# Patient Record
Sex: MALE | Race: AMERICAN INDIAN OR ALASKA NATIVE | NOT HISPANIC OR LATINO | Employment: OTHER | URBAN - METROPOLITAN AREA
[De-identification: names, ages, dates, MRNs, and addresses within clinical notes are randomized per-mention and may not be internally consistent; named-entity substitution may affect disease eponyms.]

---

## 2019-12-01 ENCOUNTER — ANESTHESIA EVENT (OUTPATIENT)
Dept: SURGERY | Facility: HOSPITAL | Age: 64
DRG: 026 | End: 2019-12-01
Payer: COMMERCIAL

## 2019-12-01 ENCOUNTER — ANESTHESIA (OUTPATIENT)
Dept: SURGERY | Facility: HOSPITAL | Age: 64
DRG: 026 | End: 2019-12-01
Payer: COMMERCIAL

## 2019-12-01 ENCOUNTER — HOSPITAL ENCOUNTER (INPATIENT)
Facility: HOSPITAL | Age: 64
LOS: 5 days | Discharge: HOME HEALTH | DRG: 026 | End: 2019-12-06
Attending: EMERGENCY MEDICINE | Admitting: PSYCHIATRY & NEUROLOGY
Payer: COMMERCIAL

## 2019-12-01 DIAGNOSIS — S06.5XAA SUBDURAL HEMATOMA: ICD-10-CM

## 2019-12-01 DIAGNOSIS — J96.02 ACUTE RESPIRATORY FAILURE WITH HYPOXIA AND HYPERCAPNIA: ICD-10-CM

## 2019-12-01 DIAGNOSIS — I62.00 SUBDURAL HEMORRHAGE: Primary | ICD-10-CM

## 2019-12-01 DIAGNOSIS — R56.9 SEIZURE: ICD-10-CM

## 2019-12-01 DIAGNOSIS — J96.01 ACUTE RESPIRATORY FAILURE WITH HYPOXIA AND HYPERCAPNIA: ICD-10-CM

## 2019-12-01 DIAGNOSIS — S06.5XAA SDH (SUBDURAL HEMATOMA): ICD-10-CM

## 2019-12-01 DIAGNOSIS — Z00.00 EVALUATION BY MEDICAL SERVICE REQUIRED: ICD-10-CM

## 2019-12-01 DIAGNOSIS — F10.929 ALCOHOLIC INTOXICATION WITH COMPLICATION: ICD-10-CM

## 2019-12-01 DIAGNOSIS — F10.229 ALCOHOL INTOXICATION IN ACTIVE ALCOHOLIC WITH COMPLICATION: ICD-10-CM

## 2019-12-01 PROBLEM — M62.82 NON-TRAUMATIC RHABDOMYOLYSIS: Status: ACTIVE | Noted: 2019-12-01

## 2019-12-01 PROBLEM — E86.1 HYPOVOLEMIA: Status: ACTIVE | Noted: 2019-12-01

## 2019-12-01 PROBLEM — E78.5 HYPERLIPIDEMIA: Status: ACTIVE | Noted: 2019-12-01

## 2019-12-01 PROBLEM — E87.6 HYPOKALEMIA: Status: ACTIVE | Noted: 2019-12-01

## 2019-12-01 PROBLEM — E11.9 DIABETES MELLITUS: Status: ACTIVE | Noted: 2019-12-01

## 2019-12-01 PROBLEM — E03.9 HYPOTHYROIDISM: Status: ACTIVE | Noted: 2019-12-01

## 2019-12-01 PROBLEM — G93.5 BRAIN COMPRESSION: Status: ACTIVE | Noted: 2019-12-01

## 2019-12-01 PROBLEM — I10 ESSENTIAL HYPERTENSION: Status: ACTIVE | Noted: 2019-12-01

## 2019-12-01 PROBLEM — G93.6 CEREBRAL EDEMA: Status: ACTIVE | Noted: 2019-12-01

## 2019-12-01 PROBLEM — S06.9XAA TBI (TRAUMATIC BRAIN INJURY): Status: ACTIVE | Noted: 2019-12-01

## 2019-12-01 PROBLEM — Z29.89 SEIZURE PROPHYLAXIS: Status: ACTIVE | Noted: 2019-12-01
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
## 2019-12-01 PROBLEM — R73.09 ELEVATED HEMOGLOBIN A1C: Status: ACTIVE | Noted: 2019-12-01

## 2019-12-01 LAB
ABO + RH BLD: NORMAL
ALBUMIN SERPL BCP-MCNC: 3.6 G/DL (ref 3.5–5.2)
ALBUMIN SERPL BCP-MCNC: 3.8 G/DL (ref 3.5–5.2)
ALBUMIN SERPL BCP-MCNC: 4.1 G/DL (ref 3.5–5.2)
ALLENS TEST: ABNORMAL
ALP SERPL-CCNC: 49 U/L (ref 55–135)
ALP SERPL-CCNC: 51 U/L (ref 55–135)
ALP SERPL-CCNC: 58 U/L (ref 55–135)
ALT SERPL W/O P-5'-P-CCNC: 43 U/L (ref 10–44)
ALT SERPL W/O P-5'-P-CCNC: 45 U/L (ref 10–44)
ALT SERPL W/O P-5'-P-CCNC: 50 U/L (ref 10–44)
ANION GAP SERPL CALC-SCNC: 11 MMOL/L (ref 8–16)
ANION GAP SERPL CALC-SCNC: 15 MMOL/L (ref 8–16)
AST SERPL-CCNC: 48 U/L (ref 10–40)
AST SERPL-CCNC: 50 U/L (ref 10–40)
AST SERPL-CCNC: 59 U/L (ref 10–40)
BASOPHILS # BLD AUTO: 0.03 K/UL (ref 0–0.2)
BASOPHILS # BLD AUTO: 0.05 K/UL (ref 0–0.2)
BASOPHILS NFR BLD: 0.3 % (ref 0–1.9)
BASOPHILS NFR BLD: 0.5 % (ref 0–1.9)
BILIRUB SERPL-MCNC: 0.3 MG/DL (ref 0.1–1)
BILIRUB SERPL-MCNC: 0.4 MG/DL (ref 0.1–1)
BILIRUB SERPL-MCNC: 0.4 MG/DL (ref 0.1–1)
BILIRUB UR QL STRIP: NEGATIVE
BLD GP AB SCN CELLS X3 SERPL QL: NORMAL
BUN SERPL-MCNC: 16 MG/DL (ref 8–23)
BUN SERPL-MCNC: 16 MG/DL (ref 8–23)
BUN SERPL-MCNC: 17 MG/DL (ref 8–23)
BUN SERPL-MCNC: 17 MG/DL (ref 8–23)
CALCIUM SERPL-MCNC: 8 MG/DL (ref 8.7–10.5)
CALCIUM SERPL-MCNC: 8 MG/DL (ref 8.7–10.5)
CALCIUM SERPL-MCNC: 8.3 MG/DL (ref 8.7–10.5)
CALCIUM SERPL-MCNC: 9.4 MG/DL (ref 8.7–10.5)
CHLORIDE SERPL-SCNC: 101 MMOL/L (ref 95–110)
CHLORIDE SERPL-SCNC: 103 MMOL/L (ref 95–110)
CHLORIDE SERPL-SCNC: 105 MMOL/L (ref 95–110)
CHLORIDE SERPL-SCNC: 105 MMOL/L (ref 95–110)
CHOLEST SERPL-MCNC: 165 MG/DL (ref 120–199)
CHOLEST/HDLC SERPL: 4.2 {RATIO} (ref 2–5)
CK MB SERPL-MCNC: 21.4 NG/ML (ref 0.1–6.5)
CK MB SERPL-RTO: 1 % (ref 0–5)
CK SERPL-CCNC: 1615 U/L (ref 20–200)
CK SERPL-CCNC: 2057 U/L (ref 20–200)
CLARITY UR REFRACT.AUTO: CLEAR
CO2 SERPL-SCNC: 20 MMOL/L (ref 23–29)
CO2 SERPL-SCNC: 23 MMOL/L (ref 23–29)
CO2 SERPL-SCNC: 23 MMOL/L (ref 23–29)
CO2 SERPL-SCNC: 25 MMOL/L (ref 23–29)
COLOR UR AUTO: YELLOW
CREAT SERPL-MCNC: 0.8 MG/DL (ref 0.5–1.4)
CREAT SERPL-MCNC: 0.9 MG/DL (ref 0.5–1.4)
DELSYS: ABNORMAL
DIFFERENTIAL METHOD: ABNORMAL
DIFFERENTIAL METHOD: ABNORMAL
EOSINOPHIL # BLD AUTO: 0 K/UL (ref 0–0.5)
EOSINOPHIL # BLD AUTO: 0.1 K/UL (ref 0–0.5)
EOSINOPHIL NFR BLD: 0.2 % (ref 0–8)
EOSINOPHIL NFR BLD: 1.3 % (ref 0–8)
ERYTHROCYTE [DISTWIDTH] IN BLOOD BY AUTOMATED COUNT: 14 % (ref 11.5–14.5)
ERYTHROCYTE [DISTWIDTH] IN BLOOD BY AUTOMATED COUNT: 14.2 % (ref 11.5–14.5)
EST. GFR  (AFRICAN AMERICAN): >60 ML/MIN/1.73 M^2
EST. GFR  (NON AFRICAN AMERICAN): >60 ML/MIN/1.73 M^2
ESTIMATED AVG GLUCOSE: 134 MG/DL (ref 68–131)
ETHANOL SERPL-MCNC: 216 MG/DL
FLOW: 2
GLUCOSE SERPL-MCNC: 161 MG/DL (ref 70–110)
GLUCOSE SERPL-MCNC: 164 MG/DL (ref 70–110)
GLUCOSE SERPL-MCNC: 166 MG/DL (ref 70–110)
GLUCOSE SERPL-MCNC: 166 MG/DL (ref 70–110)
GLUCOSE SERPL-MCNC: 194 MG/DL (ref 70–110)
GLUCOSE UR QL STRIP: NEGATIVE
HBA1C MFR BLD HPLC: 6.3 % (ref 4–5.6)
HCO3 UR-SCNC: 15.8 MMOL/L (ref 24–28)
HCT VFR BLD AUTO: 34.5 % (ref 40–54)
HCT VFR BLD AUTO: 39.8 % (ref 40–54)
HDLC SERPL-MCNC: 39 MG/DL (ref 40–75)
HDLC SERPL: 23.6 % (ref 20–50)
HGB BLD-MCNC: 11 G/DL (ref 14–18)
HGB BLD-MCNC: 12.8 G/DL (ref 14–18)
HGB UR QL STRIP: NEGATIVE
IMM GRANULOCYTES # BLD AUTO: 0.07 K/UL (ref 0–0.04)
IMM GRANULOCYTES # BLD AUTO: 0.09 K/UL (ref 0–0.04)
IMM GRANULOCYTES NFR BLD AUTO: 0.7 % (ref 0–0.5)
IMM GRANULOCYTES NFR BLD AUTO: 0.8 % (ref 0–0.5)
KETONES UR QL STRIP: NEGATIVE
LDLC SERPL CALC-MCNC: 88.4 MG/DL (ref 63–159)
LEUKOCYTE ESTERASE UR QL STRIP: NEGATIVE
LYMPHOCYTES # BLD AUTO: 1.5 K/UL (ref 1–4.8)
LYMPHOCYTES # BLD AUTO: 1.8 K/UL (ref 1–4.8)
LYMPHOCYTES NFR BLD: 14 % (ref 18–48)
LYMPHOCYTES NFR BLD: 16.8 % (ref 18–48)
MAGNESIUM SERPL-MCNC: 1.5 MG/DL (ref 1.6–2.6)
MAGNESIUM SERPL-MCNC: 1.7 MG/DL (ref 1.6–2.6)
MCH RBC QN AUTO: 31.3 PG (ref 27–31)
MCH RBC QN AUTO: 31.5 PG (ref 27–31)
MCHC RBC AUTO-ENTMCNC: 31.9 G/DL (ref 32–36)
MCHC RBC AUTO-ENTMCNC: 32.2 G/DL (ref 32–36)
MCV RBC AUTO: 98 FL (ref 82–98)
MCV RBC AUTO: 98 FL (ref 82–98)
MODE: ABNORMAL
MONOCYTES # BLD AUTO: 0.5 K/UL (ref 0.3–1)
MONOCYTES # BLD AUTO: 0.6 K/UL (ref 0.3–1)
MONOCYTES NFR BLD: 4.5 % (ref 4–15)
MONOCYTES NFR BLD: 5.6 % (ref 4–15)
NEUTROPHILS # BLD AUTO: 7.8 K/UL (ref 1.8–7.7)
NEUTROPHILS # BLD AUTO: 8.8 K/UL (ref 1.8–7.7)
NEUTROPHILS NFR BLD: 75.1 % (ref 38–73)
NEUTROPHILS NFR BLD: 80.2 % (ref 38–73)
NITRITE UR QL STRIP: NEGATIVE
NONHDLC SERPL-MCNC: 126 MG/DL
NRBC BLD-RTO: 0 /100 WBC
NRBC BLD-RTO: 0 /100 WBC
PCO2 BLDA: 27.5 MMHG (ref 35–45)
PH SMN: 7.37 [PH] (ref 7.35–7.45)
PH UR STRIP: 5 [PH] (ref 5–8)
PHOSPHATE SERPL-MCNC: 3.2 MG/DL (ref 2.7–4.5)
PHOSPHATE SERPL-MCNC: 3.4 MG/DL (ref 2.7–4.5)
PLATELET # BLD AUTO: 232 K/UL (ref 150–350)
PLATELET # BLD AUTO: 239 K/UL (ref 150–350)
PMV BLD AUTO: 10 FL (ref 9.2–12.9)
PMV BLD AUTO: 9.7 FL (ref 9.2–12.9)
PO2 BLDA: 99 MMHG (ref 80–100)
POC BE: -9 MMOL/L
POC SATURATED O2: 98 % (ref 95–100)
POC TCO2: 17 MMOL/L (ref 23–27)
POCT GLUCOSE: 164 MG/DL (ref 70–110)
POCT GLUCOSE: 172 MG/DL (ref 70–110)
POTASSIUM SERPL-SCNC: 3.9 MMOL/L (ref 3.5–5.1)
POTASSIUM SERPL-SCNC: 4 MMOL/L (ref 3.5–5.1)
POTASSIUM SERPL-SCNC: 4.1 MMOL/L (ref 3.5–5.1)
POTASSIUM SERPL-SCNC: 4.1 MMOL/L (ref 3.5–5.1)
PROT SERPL-MCNC: 6.4 G/DL (ref 6–8.4)
PROT SERPL-MCNC: 7 G/DL (ref 6–8.4)
PROT SERPL-MCNC: 7.7 G/DL (ref 6–8.4)
PROT UR QL STRIP: NEGATIVE
RBC # BLD AUTO: 3.52 M/UL (ref 4.6–6.2)
RBC # BLD AUTO: 4.06 M/UL (ref 4.6–6.2)
SAMPLE: ABNORMAL
SITE: ABNORMAL
SODIUM SERPL-SCNC: 137 MMOL/L (ref 136–145)
SODIUM SERPL-SCNC: 138 MMOL/L (ref 136–145)
SODIUM SERPL-SCNC: 139 MMOL/L (ref 136–145)
SODIUM SERPL-SCNC: 139 MMOL/L (ref 136–145)
SP GR UR STRIP: 1.02 (ref 1–1.03)
SP02: 98
TRIGL SERPL-MCNC: 188 MG/DL (ref 30–150)
TROPONIN I SERPL DL<=0.01 NG/ML-MCNC: <0.006 NG/ML (ref 0–0.03)
TSH SERPL DL<=0.005 MIU/L-ACNC: 1.11 UIU/ML (ref 0.4–4)
URN SPEC COLLECT METH UR: NORMAL
WBC # BLD AUTO: 10.43 K/UL (ref 3.9–12.7)
WBC # BLD AUTO: 10.97 K/UL (ref 3.9–12.7)

## 2019-12-01 PROCEDURE — 99291 PR CRITICAL CARE, E/M 30-74 MINUTES: ICD-10-PCS | Mod: 25,,, | Performed by: PHYSICIAN ASSISTANT

## 2019-12-01 PROCEDURE — 93010 EKG 12-LEAD: ICD-10-PCS | Mod: ,,, | Performed by: INTERNAL MEDICINE

## 2019-12-01 PROCEDURE — 25000003 PHARM REV CODE 250

## 2019-12-01 PROCEDURE — 84443 ASSAY THYROID STIM HORMONE: CPT

## 2019-12-01 PROCEDURE — 25000003 PHARM REV CODE 250: Performed by: NEUROLOGICAL SURGERY

## 2019-12-01 PROCEDURE — D9220A PRA ANESTHESIA: Mod: CRNA,,, | Performed by: NURSE ANESTHETIST, CERTIFIED REGISTERED

## 2019-12-01 PROCEDURE — 31500 INSERT EMERGENCY AIRWAY: CPT | Mod: GC,,, | Performed by: PSYCHIATRY & NEUROLOGY

## 2019-12-01 PROCEDURE — 99223 PR INITIAL HOSPITAL CARE,LEVL III: ICD-10-PCS | Mod: 57,GC,, | Performed by: NEUROLOGICAL SURGERY

## 2019-12-01 PROCEDURE — C1713 ANCHOR/SCREW BN/BN,TIS/BN: HCPCS | Performed by: NEUROLOGICAL SURGERY

## 2019-12-01 PROCEDURE — 63600175 PHARM REV CODE 636 W HCPCS: Performed by: NURSE ANESTHETIST, CERTIFIED REGISTERED

## 2019-12-01 PROCEDURE — 81003 URINALYSIS AUTO W/O SCOPE: CPT

## 2019-12-01 PROCEDURE — 84100 ASSAY OF PHOSPHORUS: CPT | Mod: 91

## 2019-12-01 PROCEDURE — 93005 ELECTROCARDIOGRAM TRACING: CPT

## 2019-12-01 PROCEDURE — 82550 ASSAY OF CK (CPK): CPT | Mod: 91

## 2019-12-01 PROCEDURE — 80061 LIPID PANEL: CPT

## 2019-12-01 PROCEDURE — 86920 COMPATIBILITY TEST SPIN: CPT

## 2019-12-01 PROCEDURE — 63600175 PHARM REV CODE 636 W HCPCS: Performed by: NURSE PRACTITIONER

## 2019-12-01 PROCEDURE — 84484 ASSAY OF TROPONIN QUANT: CPT

## 2019-12-01 PROCEDURE — 94761 N-INVAS EAR/PLS OXIMETRY MLT: CPT

## 2019-12-01 PROCEDURE — 94003 VENT MGMT INPAT SUBQ DAY: CPT

## 2019-12-01 PROCEDURE — 63600175 PHARM REV CODE 636 W HCPCS: Performed by: PHYSICIAN ASSISTANT

## 2019-12-01 PROCEDURE — 82553 CREATINE MB FRACTION: CPT

## 2019-12-01 PROCEDURE — 25000003 PHARM REV CODE 250: Performed by: NURSE ANESTHETIST, CERTIFIED REGISTERED

## 2019-12-01 PROCEDURE — 25000003 PHARM REV CODE 250: Performed by: NURSE PRACTITIONER

## 2019-12-01 PROCEDURE — 31500 PR INSERT, EMERGENCY ENDOTRACH AIRWAY: ICD-10-PCS | Mod: GC,,, | Performed by: PSYCHIATRY & NEUROLOGY

## 2019-12-01 PROCEDURE — 95951 PR EEG MONITORING/VIDEORECORD: CPT | Mod: 26,,, | Performed by: PSYCHIATRY & NEUROLOGY

## 2019-12-01 PROCEDURE — C1729 CATH, DRAINAGE: HCPCS | Performed by: NEUROLOGICAL SURGERY

## 2019-12-01 PROCEDURE — 93010 ELECTROCARDIOGRAM REPORT: CPT | Mod: ,,, | Performed by: INTERNAL MEDICINE

## 2019-12-01 PROCEDURE — 83036 HEMOGLOBIN GLYCOSYLATED A1C: CPT

## 2019-12-01 PROCEDURE — 37000009 HC ANESTHESIA EA ADD 15 MINS: Performed by: NEUROLOGICAL SURGERY

## 2019-12-01 PROCEDURE — 25000003 PHARM REV CODE 250: Performed by: PHYSICIAN ASSISTANT

## 2019-12-01 PROCEDURE — 61312 CRNEC/CRNOT STTL XDRL/SDRL: CPT | Mod: GC,,, | Performed by: NEUROLOGICAL SURGERY

## 2019-12-01 PROCEDURE — 99292 PR CRITICAL CARE, ADDL 30 MIN: ICD-10-PCS | Mod: 25,GC,, | Performed by: PSYCHIATRY & NEUROLOGY

## 2019-12-01 PROCEDURE — 27800903 OPTIME MED/SURG SUP & DEVICES OTHER IMPLANTS: Performed by: NEUROLOGICAL SURGERY

## 2019-12-01 PROCEDURE — 63600175 PHARM REV CODE 636 W HCPCS: Performed by: STUDENT IN AN ORGANIZED HEALTH CARE EDUCATION/TRAINING PROGRAM

## 2019-12-01 PROCEDURE — 25000003 PHARM REV CODE 250: Performed by: ANESTHESIOLOGY

## 2019-12-01 PROCEDURE — 36000711: Performed by: NEUROLOGICAL SURGERY

## 2019-12-01 PROCEDURE — 83735 ASSAY OF MAGNESIUM: CPT

## 2019-12-01 PROCEDURE — 99291 CRITICAL CARE FIRST HOUR: CPT | Mod: ,,, | Performed by: EMERGENCY MEDICINE

## 2019-12-01 PROCEDURE — D9220A PRA ANESTHESIA: ICD-10-PCS | Mod: ANES,,, | Performed by: ANESTHESIOLOGY

## 2019-12-01 PROCEDURE — 37799 UNLISTED PX VASCULAR SURGERY: CPT

## 2019-12-01 PROCEDURE — 25000003 PHARM REV CODE 250: Performed by: STUDENT IN AN ORGANIZED HEALTH CARE EDUCATION/TRAINING PROGRAM

## 2019-12-01 PROCEDURE — D9220A PRA ANESTHESIA: ICD-10-PCS | Mod: CRNA,,, | Performed by: NURSE ANESTHETIST, CERTIFIED REGISTERED

## 2019-12-01 PROCEDURE — 36620 PR INSERT CATH,ART,PERCUT,SHORTTERM: ICD-10-PCS | Mod: 59,,, | Performed by: ANESTHESIOLOGY

## 2019-12-01 PROCEDURE — 20000000 HC ICU ROOM

## 2019-12-01 PROCEDURE — 99223 1ST HOSP IP/OBS HIGH 75: CPT | Mod: 57,GC,, | Performed by: NEUROLOGICAL SURGERY

## 2019-12-01 PROCEDURE — 99291 CRITICAL CARE FIRST HOUR: CPT | Mod: 25,,, | Performed by: PHYSICIAN ASSISTANT

## 2019-12-01 PROCEDURE — D9220A PRA ANESTHESIA: Mod: ANES,,, | Performed by: ANESTHESIOLOGY

## 2019-12-01 PROCEDURE — 61312 PR OPEN SKULL EVAC HEMATOMA, SUPRATENTORIAL, SUB/ EXTRADURAL: ICD-10-PCS | Mod: GC,,, | Performed by: NEUROLOGICAL SURGERY

## 2019-12-01 PROCEDURE — 82550 ASSAY OF CK (CPK): CPT

## 2019-12-01 PROCEDURE — 80053 COMPREHEN METABOLIC PANEL: CPT | Mod: 91

## 2019-12-01 PROCEDURE — 95951 HC EEG MONITORING/VIDEO RECORD: CPT

## 2019-12-01 PROCEDURE — 63600175 PHARM REV CODE 636 W HCPCS

## 2019-12-01 PROCEDURE — C9113 INJ PANTOPRAZOLE SODIUM, VIA: HCPCS | Performed by: STUDENT IN AN ORGANIZED HEALTH CARE EDUCATION/TRAINING PROGRAM

## 2019-12-01 PROCEDURE — 37000008 HC ANESTHESIA 1ST 15 MINUTES: Performed by: NEUROLOGICAL SURGERY

## 2019-12-01 PROCEDURE — 63600175 PHARM REV CODE 636 W HCPCS: Performed by: NEUROLOGICAL SURGERY

## 2019-12-01 PROCEDURE — 82962 GLUCOSE BLOOD TEST: CPT

## 2019-12-01 PROCEDURE — 27201423 OPTIME MED/SURG SUP & DEVICES STERILE SUPPLY: Performed by: NEUROLOGICAL SURGERY

## 2019-12-01 PROCEDURE — 86850 RBC ANTIBODY SCREEN: CPT

## 2019-12-01 PROCEDURE — 99900035 HC TECH TIME PER 15 MIN (STAT)

## 2019-12-01 PROCEDURE — 95951 PR EEG MONITORING/VIDEORECORD: ICD-10-PCS | Mod: 26,,, | Performed by: PSYCHIATRY & NEUROLOGY

## 2019-12-01 PROCEDURE — 63600175 PHARM REV CODE 636 W HCPCS: Performed by: ANESTHESIOLOGY

## 2019-12-01 PROCEDURE — 99292 CRITICAL CARE ADDL 30 MIN: CPT | Mod: 25,GC,, | Performed by: PSYCHIATRY & NEUROLOGY

## 2019-12-01 PROCEDURE — 80320 DRUG SCREEN QUANTALCOHOLS: CPT

## 2019-12-01 PROCEDURE — 83735 ASSAY OF MAGNESIUM: CPT | Mod: 91

## 2019-12-01 PROCEDURE — 99291 CRITICAL CARE FIRST HOUR: CPT | Mod: 25

## 2019-12-01 PROCEDURE — 36620 INSERTION CATHETER ARTERY: CPT | Mod: 59,,, | Performed by: ANESTHESIOLOGY

## 2019-12-01 PROCEDURE — 94002 VENT MGMT INPAT INIT DAY: CPT

## 2019-12-01 PROCEDURE — 27000221 HC OXYGEN, UP TO 24 HOURS

## 2019-12-01 PROCEDURE — 25000003 PHARM REV CODE 250: Performed by: PSYCHIATRY & NEUROLOGY

## 2019-12-01 PROCEDURE — 85025 COMPLETE CBC W/AUTO DIFF WBC: CPT | Mod: 91

## 2019-12-01 PROCEDURE — 36000710: Performed by: NEUROLOGICAL SURGERY

## 2019-12-01 PROCEDURE — 82803 BLOOD GASES ANY COMBINATION: CPT

## 2019-12-01 PROCEDURE — 99291 PR CRITICAL CARE, E/M 30-74 MINUTES: ICD-10-PCS | Mod: ,,, | Performed by: EMERGENCY MEDICINE

## 2019-12-01 PROCEDURE — 84100 ASSAY OF PHOSPHORUS: CPT

## 2019-12-01 DEVICE — PLATE BONE 6 HOLE DBL Y SHAPE: Type: IMPLANTABLE DEVICE | Site: CRANIAL | Status: FUNCTIONAL

## 2019-12-01 DEVICE — SCREW UN3 AXS SD 1.5X4MM: Type: IMPLANTABLE DEVICE | Site: CRANIAL | Status: FUNCTIONAL

## 2019-12-01 DEVICE — DURAMATRIX ONLAY 4X5 MEMBRANE: Type: IMPLANTABLE DEVICE | Site: CRANIAL | Status: FUNCTIONAL

## 2019-12-01 DEVICE — COVER UN3 BURRHOLE 14MM TAB: Type: IMPLANTABLE DEVICE | Site: CRANIAL | Status: FUNCTIONAL

## 2019-12-01 RX ORDER — ETOMIDATE 2 MG/ML
INJECTION INTRAVENOUS
Status: DISPENSED
Start: 2019-12-01 | End: 2019-12-02

## 2019-12-01 RX ORDER — SODIUM CHLORIDE 9 MG/ML
INJECTION, SOLUTION INTRAVENOUS CONTINUOUS
Status: DISCONTINUED | OUTPATIENT
Start: 2019-12-01 | End: 2019-12-04

## 2019-12-01 RX ORDER — FENTANYL CITRATE 50 UG/ML
25 INJECTION, SOLUTION INTRAMUSCULAR; INTRAVENOUS EVERY 4 HOURS PRN
Status: DISCONTINUED | OUTPATIENT
Start: 2019-12-01 | End: 2019-12-03

## 2019-12-01 RX ORDER — SODIUM CHLORIDE 0.9 % (FLUSH) 0.9 %
10 SYRINGE (ML) INJECTION
Status: DISCONTINUED | OUTPATIENT
Start: 2019-12-01 | End: 2019-12-06 | Stop reason: HOSPADM

## 2019-12-01 RX ORDER — LIDOCAINE HCL/PF 100 MG/5ML
SYRINGE (ML) INTRAVENOUS
Status: DISCONTINUED | OUTPATIENT
Start: 2019-12-01 | End: 2019-12-01

## 2019-12-01 RX ORDER — AMLODIPINE AND BENAZEPRIL HYDROCHLORIDE 5; 10 MG/1; MG/1
1 CAPSULE ORAL DAILY
Status: DISCONTINUED | OUTPATIENT
Start: 2019-12-02 | End: 2019-12-01

## 2019-12-01 RX ORDER — SUCCINYLCHOLINE CHLORIDE 20 MG/ML
INJECTION INTRAMUSCULAR; INTRAVENOUS
Status: DISPENSED
Start: 2019-12-01 | End: 2019-12-02

## 2019-12-01 RX ORDER — INSULIN ASPART 100 [IU]/ML
1-10 INJECTION, SOLUTION INTRAVENOUS; SUBCUTANEOUS EVERY 6 HOURS PRN
Status: DISCONTINUED | OUTPATIENT
Start: 2019-12-01 | End: 2019-12-06 | Stop reason: HOSPADM

## 2019-12-01 RX ORDER — LEVETIRACETAM 5 MG/ML
500 INJECTION INTRAVASCULAR EVERY 12 HOURS
Status: DISCONTINUED | OUTPATIENT
Start: 2019-12-01 | End: 2019-12-01

## 2019-12-01 RX ORDER — ONDANSETRON 2 MG/ML
4 INJECTION INTRAMUSCULAR; INTRAVENOUS EVERY 8 HOURS PRN
Status: DISCONTINUED | OUTPATIENT
Start: 2019-12-01 | End: 2019-12-06 | Stop reason: HOSPADM

## 2019-12-01 RX ORDER — FENTANYL CITRATE 50 UG/ML
INJECTION, SOLUTION INTRAMUSCULAR; INTRAVENOUS
Status: DISCONTINUED | OUTPATIENT
Start: 2019-12-01 | End: 2019-12-01

## 2019-12-01 RX ORDER — DEXMEDETOMIDINE HYDROCHLORIDE 4 UG/ML
INJECTION, SOLUTION INTRAVENOUS
Status: COMPLETED
Start: 2019-12-01 | End: 2019-12-01

## 2019-12-01 RX ORDER — CLONAZEPAM 0.5 MG/1
1 TABLET ORAL 2 TIMES DAILY PRN
Status: DISCONTINUED | OUTPATIENT
Start: 2019-12-01 | End: 2019-12-02

## 2019-12-01 RX ORDER — ROCURONIUM BROMIDE 10 MG/ML
100 INJECTION, SOLUTION INTRAVENOUS ONCE
Status: COMPLETED | OUTPATIENT
Start: 2019-12-01 | End: 2019-12-01

## 2019-12-01 RX ORDER — PROPOFOL 10 MG/ML
INJECTION, EMULSION INTRAVENOUS
Status: DISPENSED
Start: 2019-12-01 | End: 2019-12-02

## 2019-12-01 RX ORDER — LEVOTHYROXINE SODIUM 50 UG/1
100 TABLET ORAL
Status: DISCONTINUED | OUTPATIENT
Start: 2019-12-02 | End: 2019-12-01

## 2019-12-01 RX ORDER — LEVOTHYROXINE SODIUM 50 UG/1
100 TABLET ORAL
Status: DISCONTINUED | OUTPATIENT
Start: 2019-12-02 | End: 2019-12-05

## 2019-12-01 RX ORDER — NICARDIPINE HYDROCHLORIDE 0.2 MG/ML
2.5 INJECTION INTRAVENOUS CONTINUOUS
Status: DISCONTINUED | OUTPATIENT
Start: 2019-12-01 | End: 2019-12-02

## 2019-12-01 RX ORDER — PANTOPRAZOLE SODIUM 40 MG/10ML
40 INJECTION, POWDER, LYOPHILIZED, FOR SOLUTION INTRAVENOUS DAILY
Status: DISCONTINUED | OUTPATIENT
Start: 2019-12-01 | End: 2019-12-02

## 2019-12-01 RX ORDER — METOPROLOL SUCCINATE 25 MG/1
50 TABLET, EXTENDED RELEASE ORAL DAILY
Status: DISCONTINUED | OUTPATIENT
Start: 2019-12-02 | End: 2019-12-01

## 2019-12-01 RX ORDER — ROCURONIUM BROMIDE 10 MG/ML
INJECTION, SOLUTION INTRAVENOUS
Status: DISPENSED
Start: 2019-12-01 | End: 2019-12-02

## 2019-12-01 RX ORDER — ATORVASTATIN CALCIUM 20 MG/1
80 TABLET, FILM COATED ORAL DAILY
Status: DISCONTINUED | OUTPATIENT
Start: 2019-12-02 | End: 2019-12-01

## 2019-12-01 RX ORDER — CEFAZOLIN SODIUM 1 G/3ML
INJECTION, POWDER, FOR SOLUTION INTRAMUSCULAR; INTRAVENOUS
Status: DISCONTINUED | OUTPATIENT
Start: 2019-12-01 | End: 2019-12-01

## 2019-12-01 RX ORDER — BACITRACIN ZINC 500 UNIT/G
OINTMENT (GRAM) TOPICAL
Status: DISCONTINUED | OUTPATIENT
Start: 2019-12-01 | End: 2019-12-01 | Stop reason: HOSPADM

## 2019-12-01 RX ORDER — MUPIROCIN 20 MG/G
1 OINTMENT TOPICAL 2 TIMES DAILY
Status: COMPLETED | OUTPATIENT
Start: 2019-12-01 | End: 2019-12-05

## 2019-12-01 RX ORDER — PROPOFOL 10 MG/ML
INJECTION, EMULSION INTRAVENOUS
Status: COMPLETED
Start: 2019-12-01 | End: 2019-12-01

## 2019-12-01 RX ORDER — GLYCOPYRROLATE 0.2 MG/ML
INJECTION INTRAMUSCULAR; INTRAVENOUS
Status: DISCONTINUED | OUTPATIENT
Start: 2019-12-01 | End: 2019-12-01

## 2019-12-01 RX ORDER — FOLIC ACID 1 MG/1
1 TABLET ORAL DAILY
Status: DISCONTINUED | OUTPATIENT
Start: 2019-12-01 | End: 2019-12-01

## 2019-12-01 RX ORDER — GLUCAGON 1 MG
1 KIT INJECTION
Status: DISCONTINUED | OUTPATIENT
Start: 2019-12-01 | End: 2019-12-06 | Stop reason: HOSPADM

## 2019-12-01 RX ORDER — METOPROLOL TARTRATE 50 MG/1
50 TABLET ORAL ONCE
Status: DISCONTINUED | OUTPATIENT
Start: 2019-12-01 | End: 2019-12-01

## 2019-12-01 RX ORDER — NICARDIPINE HYDROCHLORIDE 0.2 MG/ML
2.5 INJECTION INTRAVENOUS CONTINUOUS
Status: DISCONTINUED | OUTPATIENT
Start: 2019-12-01 | End: 2019-12-01

## 2019-12-01 RX ORDER — METOPROLOL TARTRATE 50 MG/1
50 TABLET ORAL DAILY
Status: DISCONTINUED | OUTPATIENT
Start: 2019-12-02 | End: 2019-12-01

## 2019-12-01 RX ORDER — ACETAMINOPHEN 650 MG/1
650 SUPPOSITORY RECTAL EVERY 6 HOURS PRN
Status: DISCONTINUED | OUTPATIENT
Start: 2019-12-01 | End: 2019-12-06 | Stop reason: HOSPADM

## 2019-12-01 RX ORDER — PROPOFOL 10 MG/ML
40 INJECTION, EMULSION INTRAVENOUS CONTINUOUS
Status: DISCONTINUED | OUTPATIENT
Start: 2019-12-01 | End: 2019-12-01

## 2019-12-01 RX ORDER — INSULIN ASPART 100 [IU]/ML
0-5 INJECTION, SOLUTION INTRAVENOUS; SUBCUTANEOUS EVERY 6 HOURS PRN
Status: DISCONTINUED | OUTPATIENT
Start: 2019-12-01 | End: 2019-12-01

## 2019-12-01 RX ORDER — DIAZEPAM 10 MG/2ML
5 INJECTION INTRAMUSCULAR ONCE
Status: COMPLETED | OUTPATIENT
Start: 2019-12-01 | End: 2019-12-01

## 2019-12-01 RX ORDER — AMLODIPINE AND BENAZEPRIL HYDROCHLORIDE 5; 10 MG/1; MG/1
1 CAPSULE ORAL DAILY
Status: DISCONTINUED | OUTPATIENT
Start: 2019-12-02 | End: 2019-12-05

## 2019-12-01 RX ORDER — FENTANYL CITRATE 50 UG/ML
25 INJECTION, SOLUTION INTRAMUSCULAR; INTRAVENOUS ONCE
Status: COMPLETED | OUTPATIENT
Start: 2019-12-01 | End: 2019-12-01

## 2019-12-01 RX ORDER — PHENYLEPHRINE HCL IN 0.9% NACL 1 MG/10 ML
SYRINGE (ML) INTRAVENOUS
Status: DISPENSED
Start: 2019-12-01 | End: 2019-12-02

## 2019-12-01 RX ORDER — NEOSTIGMINE METHYLSULFATE 0.5 MG/ML
INJECTION, SOLUTION INTRAVENOUS
Status: DISCONTINUED | OUTPATIENT
Start: 2019-12-01 | End: 2019-12-01

## 2019-12-01 RX ORDER — ACETAMINOPHEN 325 MG/1
650 TABLET ORAL EVERY 6 HOURS PRN
Status: DISCONTINUED | OUTPATIENT
Start: 2019-12-01 | End: 2019-12-05

## 2019-12-01 RX ORDER — FENTANYL CITRATE 50 UG/ML
INJECTION, SOLUTION INTRAMUSCULAR; INTRAVENOUS
Status: COMPLETED
Start: 2019-12-01 | End: 2019-12-01

## 2019-12-01 RX ORDER — BACITRACIN 50000 [IU]/1
INJECTION, POWDER, FOR SOLUTION INTRAMUSCULAR
Status: DISCONTINUED | OUTPATIENT
Start: 2019-12-01 | End: 2019-12-01 | Stop reason: HOSPADM

## 2019-12-01 RX ORDER — LORAZEPAM 2 MG/ML
INJECTION INTRAMUSCULAR
Status: COMPLETED
Start: 2019-12-01 | End: 2019-12-01

## 2019-12-01 RX ORDER — LIDOCAINE HYDROCHLORIDE AND EPINEPHRINE 10; 10 MG/ML; UG/ML
INJECTION, SOLUTION INFILTRATION; PERINEURAL
Status: DISCONTINUED | OUTPATIENT
Start: 2019-12-01 | End: 2019-12-01 | Stop reason: HOSPADM

## 2019-12-01 RX ORDER — POLYETHYLENE GLYCOL 3350 17 G/17G
17 POWDER, FOR SOLUTION ORAL DAILY
Status: DISCONTINUED | OUTPATIENT
Start: 2019-12-01 | End: 2019-12-01

## 2019-12-01 RX ORDER — PROPOFOL 10 MG/ML
VIAL (ML) INTRAVENOUS
Status: DISCONTINUED | OUTPATIENT
Start: 2019-12-01 | End: 2019-12-01

## 2019-12-01 RX ORDER — HYDROCODONE BITARTRATE AND ACETAMINOPHEN 5; 325 MG/1; MG/1
1 TABLET ORAL EVERY 4 HOURS PRN
Status: DISCONTINUED | OUTPATIENT
Start: 2019-12-01 | End: 2019-12-01

## 2019-12-01 RX ORDER — CLONAZEPAM 0.5 MG/1
1 TABLET ORAL 2 TIMES DAILY PRN
Status: DISCONTINUED | OUTPATIENT
Start: 2019-12-01 | End: 2019-12-01

## 2019-12-01 RX ORDER — LEVETIRACETAM 10 MG/ML
1000 INJECTION INTRAVASCULAR EVERY 12 HOURS
Status: DISCONTINUED | OUTPATIENT
Start: 2019-12-01 | End: 2019-12-05

## 2019-12-01 RX ORDER — ACETAMINOPHEN 325 MG/1
650 TABLET ORAL EVERY 6 HOURS PRN
Status: DISCONTINUED | OUTPATIENT
Start: 2019-12-01 | End: 2019-12-01

## 2019-12-01 RX ORDER — ETOMIDATE 2 MG/ML
40 INJECTION INTRAVENOUS ONCE
Status: COMPLETED | OUTPATIENT
Start: 2019-12-01 | End: 2019-12-01

## 2019-12-01 RX ORDER — LEVOTHYROXINE SODIUM 100 UG/1
100 TABLET ORAL
Status: DISCONTINUED | OUTPATIENT
Start: 2019-12-02 | End: 2019-12-01

## 2019-12-01 RX ORDER — DIAZEPAM 10 MG/2ML
10 INJECTION INTRAMUSCULAR ONCE
Status: DISCONTINUED | OUTPATIENT
Start: 2019-12-01 | End: 2019-12-02

## 2019-12-01 RX ORDER — METOPROLOL TARTRATE 50 MG/1
50 TABLET ORAL ONCE
Status: DISCONTINUED | OUTPATIENT
Start: 2019-12-02 | End: 2019-12-01

## 2019-12-01 RX ORDER — NICARDIPINE HYDROCHLORIDE 2.5 MG/ML
INJECTION INTRAVENOUS
Status: DISCONTINUED | OUTPATIENT
Start: 2019-12-01 | End: 2019-12-01

## 2019-12-01 RX ORDER — PHENYLEPHRINE HYDROCHLORIDE 10 MG/ML
INJECTION INTRAVENOUS
Status: DISCONTINUED | OUTPATIENT
Start: 2019-12-01 | End: 2019-12-01

## 2019-12-01 RX ORDER — DIAZEPAM 10 MG/2ML
INJECTION INTRAMUSCULAR
Status: COMPLETED
Start: 2019-12-01 | End: 2019-12-01

## 2019-12-01 RX ORDER — ROCURONIUM BROMIDE 10 MG/ML
INJECTION, SOLUTION INTRAVENOUS
Status: DISCONTINUED | OUTPATIENT
Start: 2019-12-01 | End: 2019-12-01

## 2019-12-01 RX ORDER — LORAZEPAM 2 MG/ML
INJECTION INTRAMUSCULAR
Status: DISPENSED
Start: 2019-12-01 | End: 2019-12-02

## 2019-12-01 RX ORDER — HYDROCODONE BITARTRATE AND ACETAMINOPHEN 5; 325 MG/1; MG/1
1 TABLET ORAL EVERY 4 HOURS PRN
Status: DISCONTINUED | OUTPATIENT
Start: 2019-12-01 | End: 2019-12-04

## 2019-12-01 RX ORDER — GLUCAGON 1 MG
1 KIT INJECTION
Status: DISCONTINUED | OUTPATIENT
Start: 2019-12-01 | End: 2019-12-01

## 2019-12-01 RX ORDER — POLYETHYLENE GLYCOL 3350 17 G/17G
17 POWDER, FOR SOLUTION ORAL DAILY
Status: DISCONTINUED | OUTPATIENT
Start: 2019-12-02 | End: 2019-12-01

## 2019-12-01 RX ORDER — POLYETHYLENE GLYCOL 3350 17 G/17G
17 POWDER, FOR SOLUTION ORAL DAILY
Status: DISCONTINUED | OUTPATIENT
Start: 2019-12-02 | End: 2019-12-05

## 2019-12-01 RX ORDER — SUCCINYLCHOLINE CHLORIDE 20 MG/ML
INJECTION INTRAMUSCULAR; INTRAVENOUS
Status: DISCONTINUED | OUTPATIENT
Start: 2019-12-01 | End: 2019-12-01

## 2019-12-01 RX ORDER — LEVOTHYROXINE SODIUM 50 UG/1
100 TABLET ORAL DAILY
Status: DISCONTINUED | OUTPATIENT
Start: 2019-12-01 | End: 2019-12-01

## 2019-12-01 RX ORDER — CEFAZOLIN SODIUM 1 G/3ML
2 INJECTION, POWDER, FOR SOLUTION INTRAMUSCULAR; INTRAVENOUS
Status: DISCONTINUED | OUTPATIENT
Start: 2019-12-01 | End: 2019-12-03

## 2019-12-01 RX ORDER — METOPROLOL SUCCINATE 25 MG/1
50 TABLET, EXTENDED RELEASE ORAL DAILY
Status: DISCONTINUED | OUTPATIENT
Start: 2019-12-02 | End: 2019-12-02

## 2019-12-01 RX ORDER — LORAZEPAM 2 MG/ML
2 INJECTION INTRAMUSCULAR ONCE
Status: COMPLETED | OUTPATIENT
Start: 2019-12-01 | End: 2019-12-01

## 2019-12-01 RX ADMIN — DEXMEDETOMIDINE HYDROCHLORIDE 400 MCG: 4 INJECTION, SOLUTION INTRAVENOUS at 02:12

## 2019-12-01 RX ADMIN — SUCCINYLCHOLINE CHLORIDE 160 MG: 20 INJECTION, SOLUTION INTRAMUSCULAR; INTRAVENOUS at 06:12

## 2019-12-01 RX ADMIN — CEFAZOLIN 2 G: 1 INJECTION, POWDER, FOR SOLUTION INTRAMUSCULAR; INTRAVENOUS at 03:12

## 2019-12-01 RX ADMIN — PROPOFOL: 10 INJECTION, EMULSION INTRAVENOUS at 04:12

## 2019-12-01 RX ADMIN — LEVETIRACETAM 500 MG: 5 INJECTION INTRAVENOUS at 11:12

## 2019-12-01 RX ADMIN — NICARDIPINE HYDROCHLORIDE 0.2 MG: 2.5 INJECTION INTRAVENOUS at 09:12

## 2019-12-01 RX ADMIN — FENTANYL CITRATE 25 MCG: 50 INJECTION INTRAMUSCULAR; INTRAVENOUS at 02:12

## 2019-12-01 RX ADMIN — ETOMIDATE 40 MG: 2 INJECTION INTRAVENOUS at 04:12

## 2019-12-01 RX ADMIN — LORAZEPAM 2 MG: 2 INJECTION INTRAMUSCULAR at 03:12

## 2019-12-01 RX ADMIN — SODIUM CHLORIDE 75 ML/HR: 0.9 INJECTION, SOLUTION INTRAVENOUS at 11:12

## 2019-12-01 RX ADMIN — SODIUM CHLORIDE 500 ML: 0.9 INJECTION, SOLUTION INTRAVENOUS at 10:12

## 2019-12-01 RX ADMIN — LORAZEPAM 2 MG: 2 INJECTION, SOLUTION INTRAMUSCULAR; INTRAVENOUS at 03:12

## 2019-12-01 RX ADMIN — NICARDIPINE HYDROCHLORIDE 0.2 MG: 2.5 INJECTION INTRAVENOUS at 08:12

## 2019-12-01 RX ADMIN — FENTANYL CITRATE 25 MCG: 50 INJECTION, SOLUTION INTRAMUSCULAR; INTRAVENOUS at 02:12

## 2019-12-01 RX ADMIN — NICARDIPINE HYDROCHLORIDE 2.5 MG/HR: 0.2 INJECTION, SOLUTION INTRAVENOUS at 11:12

## 2019-12-01 RX ADMIN — MUPIROCIN 1 G: 20 OINTMENT TOPICAL at 08:12

## 2019-12-01 RX ADMIN — GLYCOPYRROLATE 0.4 MG: 0.2 INJECTION, SOLUTION INTRAMUSCULAR; INTRAVENOUS at 09:12

## 2019-12-01 RX ADMIN — SODIUM CHLORIDE 1000 ML: 0.9 INJECTION, SOLUTION INTRAVENOUS at 03:12

## 2019-12-01 RX ADMIN — ROCURONIUM BROMIDE 40 MG: 10 INJECTION, SOLUTION INTRAVENOUS at 06:12

## 2019-12-01 RX ADMIN — FOLIC ACID 1 MG: 5 INJECTION, SOLUTION INTRAMUSCULAR; INTRAVENOUS; SUBCUTANEOUS at 05:12

## 2019-12-01 RX ADMIN — CEFAZOLIN 2 G: 1 INJECTION, POWDER, FOR SOLUTION INTRAMUSCULAR; INTRAVENOUS at 10:12

## 2019-12-01 RX ADMIN — DIAZEPAM 10 MG: 5 INJECTION, SOLUTION INTRAMUSCULAR; INTRAVENOUS at 03:12

## 2019-12-01 RX ADMIN — CEFAZOLIN 2 G: 330 INJECTION, POWDER, FOR SOLUTION INTRAMUSCULAR; INTRAVENOUS at 06:12

## 2019-12-01 RX ADMIN — ROCURONIUM BROMIDE 10 MG: 10 INJECTION, SOLUTION INTRAVENOUS at 06:12

## 2019-12-01 RX ADMIN — PANTOPRAZOLE SODIUM 40 MG: 40 INJECTION, POWDER, FOR SOLUTION INTRAVENOUS at 11:12

## 2019-12-01 RX ADMIN — LIDOCAINE HYDROCHLORIDE 100 MG: 20 INJECTION, SOLUTION INTRAVENOUS at 06:12

## 2019-12-01 RX ADMIN — PHENYLEPHRINE HYDROCHLORIDE 100 MCG: 10 INJECTION INTRAVENOUS at 07:12

## 2019-12-01 RX ADMIN — NEOSTIGMINE METHYLSULFATE 3 MG: 0.5 INJECTION INTRAVENOUS at 09:12

## 2019-12-01 RX ADMIN — DIAZEPAM 5 MG: 5 INJECTION, SOLUTION INTRAMUSCULAR; INTRAVENOUS at 09:12

## 2019-12-01 RX ADMIN — MUPIROCIN 1 G: 20 OINTMENT TOPICAL at 11:12

## 2019-12-01 RX ADMIN — SODIUM CHLORIDE, SODIUM GLUCONATE, SODIUM ACETATE, POTASSIUM CHLORIDE, MAGNESIUM CHLORIDE, SODIUM PHOSPHATE, DIBASIC, AND POTASSIUM PHOSPHATE: .53; .5; .37; .037; .03; .012; .00082 INJECTION, SOLUTION INTRAVENOUS at 05:12

## 2019-12-01 RX ADMIN — NICARDIPINE HYDROCHLORIDE 0.1 MG: 2.5 INJECTION INTRAVENOUS at 08:12

## 2019-12-01 RX ADMIN — SODIUM CHLORIDE: 0.9 INJECTION, SOLUTION INTRAVENOUS at 12:12

## 2019-12-01 RX ADMIN — FOLIC ACID: 5 INJECTION, SOLUTION INTRAMUSCULAR; INTRAVENOUS; SUBCUTANEOUS at 12:12

## 2019-12-01 RX ADMIN — PROPOFOL 50 MCG/KG/MIN: 10 INJECTION, EMULSION INTRAVENOUS at 10:12

## 2019-12-01 RX ADMIN — PROPOFOL 40 MG: 10 INJECTION, EMULSION INTRAVENOUS at 06:12

## 2019-12-01 RX ADMIN — SODIUM CHLORIDE: 0.9 INJECTION, SOLUTION INTRAVENOUS at 04:12

## 2019-12-01 RX ADMIN — DIAZEPAM 5 MG: 5 INJECTION, SOLUTION INTRAMUSCULAR; INTRAVENOUS at 03:12

## 2019-12-01 RX ADMIN — SODIUM CHLORIDE, SODIUM GLUCONATE, SODIUM ACETATE, POTASSIUM CHLORIDE, MAGNESIUM CHLORIDE, SODIUM PHOSPHATE, DIBASIC, AND POTASSIUM PHOSPHATE: .53; .5; .37; .037; .03; .012; .00082 INJECTION, SOLUTION INTRAVENOUS at 06:12

## 2019-12-01 RX ADMIN — ROCURONIUM BROMIDE 100 MG: 10 INJECTION, SOLUTION INTRAVENOUS at 04:12

## 2019-12-01 RX ADMIN — FENTANYL CITRATE 100 MCG: 50 INJECTION, SOLUTION INTRAMUSCULAR; INTRAVENOUS at 06:12

## 2019-12-01 RX ADMIN — PROPOFOL 20 MG: 10 INJECTION, EMULSION INTRAVENOUS at 08:12

## 2019-12-01 RX ADMIN — PROPOFOL 150 MG: 10 INJECTION, EMULSION INTRAVENOUS at 06:12

## 2019-12-01 RX ADMIN — LEVETIRACETAM 1000 MG: 10 INJECTION INTRAVENOUS at 08:12

## 2019-12-01 RX ADMIN — PROPOFOL 50 MCG/KG/MIN: 10 INJECTION, EMULSION INTRAVENOUS at 08:12

## 2019-12-01 RX ADMIN — HYDROCODONE BITARTRATE AND ACETAMINOPHEN 1 TABLET: 5; 325 TABLET ORAL at 12:12

## 2019-12-01 NOTE — ANESTHESIA PROCEDURE NOTES
Arterial    Diagnosis: Sudural hematoma    Patient location during procedure: done in OR  Procedure start time: 12/1/2019 6:14 AM  Timeout: 12/1/2019 6:14 AM  Procedure end time: 12/1/2019 6:16 AM    Staffing  Authorizing Provider: Adelso Sanchez MD  Performing Provider: Abundio Peters MD    Staffing  Other anesthesia staff: Adelso Sanchez MD  Anesthesiologist was present at the time of the procedure.    Preanesthetic Checklist  Completed: patient identified, site marked, surgical consent, pre-op evaluation, timeout performed, IV checked, risks and benefits discussed, monitors and equipment checked and anesthesia consent givenArterial  Skin Prep: chlorhexidine gluconate  Local Infiltration: none  Orientation: left  Location: radial  Catheter Size: 20 G  Catheter placement by Anatomical landmarks. Heme positive aspiration all ports.Insertion Attempts: 2  Assessment  Dressing: secured with tape and tegaderm  Patient: Tolerated well

## 2019-12-01 NOTE — LETTER
December 6, 2019         [address]  [facility] [address]  Phone: [phone]  Fax: [phone]       Patient: Jermaine Linda   YOB: 1955  Date of Visit: 12/06/2019    To Whom It May Concern:    Aileen Linda  was at [facility] from 12/1/2019 until 12/06/2019. His wife, Mary Linda, was present at the bedside throughout his admission providing physical and emotional support and was unable to be at work. Please excuse her absence. If you have any questions or concerns, or if I can be of further assistance, please do not hesitate to contact me.    Sincerely,    Tamika Harvey PA-C

## 2019-12-01 NOTE — PROGRESS NOTES
sdh-poa  Acute alcohol intoxication-poa  Brain compression-poa  Hypovolemia-poa  Hypokalemia  Elevated hepatic enzymes  Essential hypertension-poa      Plan:  Fluids  Banana bag  Follow exam  follow for Dt's  keppra  Correct elect  Wean cardene gtt      Critical secondary to Patient has a condition that poses threat to life and bodily function: at very high risk of deterioration/keep sbp < 140/follo wfor etog withdrawal    Cc time 41 mins     Critical care was time spent personally by me on the following activities: development of treatment plan with patient or surrogate and bedside caregivers, discussions with consultants, evaluation of patient's response to treatment, examination of patient, ordering and performing treatments and interventions, ordering and review of laboratory studies, ordering and review of radiographic studies, pulse oximetry, re-evaluation of patient's condition. This critical care time did not overlap with that of any other provider or involve time for any procedures.

## 2019-12-01 NOTE — PROGRESS NOTES
Patient arrived to [facility] from [facility] ED - OR - 9000    Type of stroke/diagnosis:  SDH    TPA start and end time (if applicable) NA    Thrombectomy start and end time (if applicable) NA, Evac on 12/1    Current symptoms: post op - disoriented x4, follows commands, spontaneous movement x4, right droop    Skin assessment done: Y  Wounds noted: missing left great toe, subgalial drain, right sided cranial incision  ISAAC Armband Applied: yes    NCC notified: JAZMIN Cooper

## 2019-12-01 NOTE — ASSESSMENT & PLAN NOTE
Right Traumatic SDH   --Continue Neuro checks q 1hr  -- Neurosurgery consulted  -- CT Head reveals acute subdural hemorrhage along the right cerebral convexity with worsening mass effect andleftward midline shift of approximately 12 mm and partial effacement of the basal cisterns.  -- SBP goal less than 140  -- Keppra 500 mg BID  -- Class A to the OR  -- Pending further recommendations per NGSY

## 2019-12-01 NOTE — PT/OT/SLP PROGRESS
Speech Language Pathology      Jermaine Linda  MRN: [id number]    Patient not seen today secondary to surgery. Pt in OR when SLP attempted to see for eval. Will follow-up as scheduled.    Taylor Blair, SHANICE-SLP

## 2019-12-01 NOTE — HPI
64yom w pmh of HTN, HLD, DM2, thyroid disease, anxiety, and hx of prostate cancer presents after mechanical fall getting out of car and hitting head against concrete found at [place] ED to have R SDH. pt transferred to [facility] and repeat CTH showed worsening SDH with icreased mass effect, and decline in mental status in ED.

## 2019-12-01 NOTE — PROCEDURES
Jermaine Linda is a 64 y.o. male patient.    Temp: 97.2 °F (36.2 °C) (12/01/19 1001)  Pulse: (!) 136 (12/01/19 1505)  Resp: (!) 38 (12/01/19 1505)  BP: (!) 161/75 (12/01/19 1001)  SpO2: 99 % (12/01/19 1505)  Weight: 102.1 kg (225 lb) (12/01/19 0350)       Intubation  Date/Time: 12/1/2019 4:04 PM  Location procedure was performed: [facility] NEURO CRITICAL CARE  Performed by: Carlos Blanco MD  Authorized by: Carlos Blanco MD   Assisting provider: Kannan Rodrigez MD  Consent Done: Emergent Situation  Indications: respiratory distress,  airway protection and  respiratory failure  Intubation method: video-assisted  Patient status: paralyzed (RSI)  Preoxygenation: bag valve mask and nasal cannula  Pretreatment medications: none  Sedatives: etomidate  Paralytic: rocuronium  Laryngoscope size: Mac 4  Tube size: 8.0 mm  Tube type: cuffed  Number of attempts: 1  Cricoid pressure: no  Cords visualized: yes  Post-procedure assessment: chest rise and CO2 detector  Breath sounds: equal  Cuff inflated: yes  ETT to teeth: 24 cm  Tube secured with: ETT espinoza  Chest x-ray interpreted by me.  Chest x-ray findings: endotracheal tube in appropriate position  Complications: No  Specimens: No  Implants: No          Carlos Blanco  12/1/2019

## 2019-12-01 NOTE — CONSULTS
[facility]  Neurosurgery  Consult Note    Consults  Subjective:     Chief Complaint/Reason for Admission: fell and hit head    History of Present Illness: 64yom w pmh of HTN, HLD, DM2, thyroid disease, anxiety, and hx of prostate cancer presents after mechanical fall getting out of car and hitting head against concrete found at [facility] ED to have R SDH. pt transferred to [facility] and repeat CTH showed worsening SDH with icreased mass effect, and decline in mental status in ED.      (Not in a hospital admission)    Review of patient's allergies indicates:   Allergen Reactions    Shellfish containing products        Past Medical History:   Diagnosis Date    Anxiety     Cancer     Diabetes mellitus     Hypertension      History reviewed. No pertinent surgical history.  Family History     None        Tobacco Use    Smoking status: Never Smoker    Smokeless tobacco: Never Used   Substance and Sexual Activity    Alcohol use: Not Currently    Drug use: Never    Sexual activity: Not Currently     Review of Systems   Unable to perform ROS: Mental status change     Objective:     Weight: 102.1 kg (225 lb)  Body mass index is 30.52 kg/m².  Vital Signs (Most Recent):  Temp: 97.6 °F (36.4 °C) (12/01/19 0350)  Pulse: 85 (12/01/19 0446)  Resp: 16 (12/01/19 0350)  BP: 133/70 (12/01/19 0446)  SpO2: 98 % (12/01/19 0446) Vital Signs (24h Range):  Temp:  [97.6 °F (36.4 °C)-97.8 °F (36.6 °C)] 97.6 °F (36.4 °C)  Pulse:  [82-89] 85  Resp:  [16] 16  SpO2:  [94 %-98 %] 98 %  BP: (113-147)/(66-86) 133/70                     Male External Urinary Catheter 12/01/19 0449 Medium (Active)       Physical Exam:    Constitutional: He appears well-developed and well-nourished.     Cardiovascular: Normal rate.     Abdominal: Soft.     Neurological:   E3V4M6  Lethargic, oriented to person only  PERRL, CNII-XII grossly intact  Follows commands x4  Diffuse L sided weakness 4/5, FS on R       Significant Labs:  Recent Labs   Lab  12/01/19 0223 12/01/19 0404   * 161*    137   K 3.9 3.9    101   CO2 26 25   BUN 18 17   CREATININE 0.90 0.9   CALCIUM 9.8 9.4   MG  --  1.7     Recent Labs   Lab 12/01/19 0223 12/01/19 0404   WBC 6.30 10.43   HGB 13.7* 12.8*   HCT 41.0 39.8*    239     Recent Labs   Lab 12/01/19 0223   LABPT 12.6   INR 1.0   APTT 25.9     Microbiology Results (last 7 days)     ** No results found for the last 168 hours. **        All pertinent labs from the last 24 hours have been reviewed.    Significant Diagnostics:  CT: Ct Head Without Contrast    Result Date: 12/1/2019  Significant interval enlargement of acute subdural hemorrhage along the right cerebral convexity with worsening mass effect and new leftward midline shift of approximately 12 mm and partial effacement of the basal cisterns.  Urgent neurosurgical evaluation recommended. This report was flagged in Epic as abnormal. COMMUNICATION This critical result was discovered/received at 04:38.  The critical information above was relayed directly by me by telephone to Dr. Hanson on 12/01/2019 at 04:40. Electronically signed by: Esteban Moody MD Date:    12/01/2019 Time:    04:48  MRI: No results found in the last 24 hours.    Assessment/Plan:     SDH (subdural hematoma)  64yom w pmh of HTN, HLD, DM2, thyroid disease, anxiety, and hx of prostate cancer presents after mechanical fall getting out of car and hitting head against concrete found at [facility] ED to have R SDH. pt transferred to [facility] and repeat CTH showed worsening SDH with icreased mass effect, and decline in mental status in ED.     Plan:  Admit NCC  Plan for OR, Class A for R hemicraniotomy, possible craniectomy for SDH evacuation  Q1hr neurochecks  HOB>30  Close intubation watch  Monitor Na  SBP<140  CBC, CMP, coags, Type and screen  Keppra for seizure prophylaxis  Please contact with any sudden neuro changes.        Thank you for your consult. I will follow-up with patient.  Please contact us if you have any additional questions.    Abundio Townsend MD  Neurosurgery  [facility]

## 2019-12-01 NOTE — PT/OT/SLP PROGRESS
Physical Therapy      Patient Name:  Jermaine Linda   MRN:  [id number]    Patient not seen today secondary to  patient to OR this AM for craniotomy. PT orders d/c'd and will require new orders when medically appropriate.    Karrie Rolon, PT   12/01/2019

## 2019-12-01 NOTE — SUBJECTIVE & OBJECTIVE
(Not in a hospital admission)    Review of patient's allergies indicates:   Allergen Reactions    Shellfish containing products        Past Medical History:   Diagnosis Date    Anxiety     Cancer     Diabetes mellitus     Hypertension      History reviewed. No pertinent surgical history.  Family History     None        Tobacco Use    Smoking status: Never Smoker    Smokeless tobacco: Never Used   Substance and Sexual Activity    Alcohol use: Not Currently    Drug use: Never    Sexual activity: Not Currently     Review of Systems   Unable to perform ROS: Mental status change     Objective:     Weight: 102.1 kg (225 lb)  Body mass index is 30.52 kg/m².  Vital Signs (Most Recent):  Temp: 97.6 °F (36.4 °C) (12/01/19 0350)  Pulse: 85 (12/01/19 0446)  Resp: 16 (12/01/19 0350)  BP: 133/70 (12/01/19 0446)  SpO2: 98 % (12/01/19 0446) Vital Signs (24h Range):  Temp:  [97.6 °F (36.4 °C)-97.8 °F (36.6 °C)] 97.6 °F (36.4 °C)  Pulse:  [82-89] 85  Resp:  [16] 16  SpO2:  [94 %-98 %] 98 %  BP: (113-147)/(66-86) 133/70                     Male External Urinary Catheter 12/01/19 0449 Medium (Active)       Physical Exam:    Constitutional: He appears well-developed and well-nourished.     Cardiovascular: Normal rate.     Abdominal: Soft.     Neurological:   E3V4M6  Lethargic, oriented to person only  PERRL, CNII-XII grossly intact  Follows commands x4  Diffuse L sided weakness 4/5, FS on R       Significant Labs:  Recent Labs   Lab 12/01/19 0223 12/01/19 0404   * 161*    137   K 3.9 3.9    101   CO2 26 25   BUN 18 17   CREATININE 0.90 0.9   CALCIUM 9.8 9.4   MG  --  1.7     Recent Labs   Lab 12/01/19 0223 12/01/19 0404   WBC 6.30 10.43   HGB 13.7* 12.8*   HCT 41.0 39.8*    239     Recent Labs   Lab 12/01/19 0223   LABPT 12.6   INR 1.0   APTT 25.9     Microbiology Results (last 7 days)     ** No results found for the last 168 hours. **        All pertinent labs from the last 24 hours have been  reviewed.    Significant Diagnostics:  CT: Ct Head Without Contrast    Result Date: 12/1/2019  Significant interval enlargement of acute subdural hemorrhage along the right cerebral convexity with worsening mass effect and new leftward midline shift of approximately 12 mm and partial effacement of the basal cisterns.  Urgent neurosurgical evaluation recommended. This report was flagged in Epic as abnormal. COMMUNICATION This critical result was discovered/received at 04:38.  The critical information above was relayed directly by me by telephone to Dr. Hanson on 12/01/2019 at 04:40. Electronically signed by: Esteban Moody MD Date:    12/01/2019 Time:    04:48  MRI: No results found in the last 24 hours.   Detail Level: Zone

## 2019-12-01 NOTE — ASSESSMENT & PLAN NOTE
Hypertension  -- Continue to monitor HR and BP   --SBP goal < 140  -- 2D echo pending  --Continue home med amlodipine-benazepril 5-10 daily

## 2019-12-01 NOTE — BRIEF OP NOTE
[facility]  Brief Operative Note    SUMMARY     Surgery Date: 12/1/2019     Surgeon(s) and Role:     * Niles Toledo DO - Primary     * Jean Murray MD - Resident - Assisting        Pre-op Diagnosis:  SDH (subdural hematoma) [S06.5X9A]    Post-op Diagnosis:  Post-Op Diagnosis Codes:     * SDH (subdural hematoma) [S06.5X9A]    Procedure(s) (LRB):  Right CRANIOTOMY, FOR SUBDURAL HEMATOMA EVACUATION  (Right)    Anesthesia: General    Description of Procedure: Right frontotemporoparietal craniotomy for evacuation of subdural hematoma      Estimated Blood Loss: * No values recorded between 12/1/2019  6:38 AM and 12/1/2019  9:11 AM *         Specimens:   Specimen (12h ago, onward)    None

## 2019-12-01 NOTE — HOSPITAL COURSE
12/1 admit to NCC s/p SDH after mechanical fall +blood alcohol level 216 and getting out of car and hitting head against concrete  12/2: attempted to wean propofol-pt very agitated, continue continue scheduled diazepam 5mg TID, SBP <160, add metoprolol BID,CXR, bisacodyl,SBT and nutrition consult.   12/3: Agitated overnight, Precedex gtt added to Prop. DC'd this AM for extubation. Subgaleal drain removed this AM. Will obtain CTH post drain pull. Continues on TID Valium for withdrawal.   12/4: Agitated overnight, Precedex gtt restarted, off this AM. Poor sleep overnight, drowsy this AM. Post drain pull CT reviewed. Delirium precautions/qhs Seroquel added today w/PRNs available. Continuing on TF until agitation resolves before starting PO. Starting Amantadine today as well as Escudero placement/Silodosin.   12/5: NAEON. Agitation resolved. OOBTC this AM w/improved exam. NGT removed and starting PO. Will remove Escudero and TTF with NSGY.

## 2019-12-01 NOTE — ED PROVIDER NOTES
Encounter Date: 12/1/2019       History     Chief Complaint   Patient presents with    Fall     [facility] [place] - sdh      Patient is a 64 year old male with PMHX of HTN, HLD, DM2, thyroid disease, anxiety, and hx of prostate cancer. He presents to the ED via EMS for SDH. [place] [facility] [place] Unable to obtain HPI, FHx, social hx, or surgical hx due to AMS.    The history is provided by medical records. The history is limited by the condition of the patient. No  was used.     Review of patient's allergies indicates:   Allergen Reactions    Shellfish containing products      Past Medical History:   Diagnosis Date    Anxiety     Cancer     Diabetes mellitus     Hypertension      History reviewed. No pertinent surgical history.  History reviewed. No pertinent family history.  Social History     Tobacco Use    Smoking status: Never Smoker    Smokeless tobacco: Never Used   Substance Use Topics    Alcohol use: Not Currently    Drug use: Never     Review of Systems   Unable to perform ROS: Mental status change       Physical Exam     Initial Vitals [12/01/19 0350]   BP Pulse Resp Temp SpO2   135/69 85 16 97.6 °F (36.4 °C) 96 %      MAP       --         Physical Exam    Vitals reviewed.  Constitutional: He appears well-developed and well-nourished. He appears listless. He is Obese .   HENT:   Head: Normocephalic.   Eyes: Conjunctivae and EOM are normal. Pupils are equal, round, and reactive to light.   Pupils dilated b/l.    Neck: Normal range of motion.   Cardiovascular: Normal rate and regular rhythm.   No murmur heard.  Pulmonary/Chest: Breath sounds normal. No respiratory distress. He has no wheezes. He has no rales.   Abdominal: Soft. Bowel sounds are normal. He exhibits no distension. There is no tenderness.   Musculoskeletal: Normal range of motion.   Neurological: He appears listless.   Patient oriented to self. Patient disoriented  to time and place. Motor strength of R UE and LE 5/5. Left UE and LE 2/5. Pronator drift positive. Right facial droop or asymmetry. Speech is dysarthric. Tongue protrudes midline with no fasciculations. Gait not assessed.   Skin: Skin is warm and dry. No erythema.         ED Course   Procedures  Labs Reviewed   CBC W/ AUTO DIFFERENTIAL - Abnormal; Notable for the following components:       Result Value    RBC 4.06 (*)     Hemoglobin 12.8 (*)     Hematocrit 39.8 (*)     Mean Corpuscular Hemoglobin 31.5 (*)     Immature Granulocytes 0.7 (*)     Gran # (ANC) 7.8 (*)     Immature Grans (Abs) 0.07 (*)     Gran% 75.1 (*)     Lymph% 16.8 (*)     All other components within normal limits   ALCOHOL,MEDICAL (ETHANOL) - Abnormal; Notable for the following components:    Alcohol, Medical, Serum 216 (*)     All other components within normal limits   HEMOGLOBIN A1C - Abnormal; Notable for the following components:    Hemoglobin A1C 6.3 (*)     Estimated Avg Glucose 134 (*)     All other components within normal limits    Narrative:     ADD ON HEMOGLOBIN A1C [id number] PER CLAYTON TIJERINA MD 05:00    12/01/2019  ADD ON PHOSPHORUS [id number] AND TROPONIN [id number] AND CK-MB [id number]  AND MAGNESIUM [id number] AND LIPID PANEL [id number] AND TSH [id number]  AND COMPREHENSIVE METABOLIC PANEL [id number]   CK-MB - Abnormal; Notable for the following components:    CPK 2057 (*)     CPK MB 21.4 (*)     All other components within normal limits    Narrative:     ADD ON HEMOGLOBIN A1C [id number] PER CLAYTON TIJERINA MD 05:00    12/01/2019  ADD ON PHOSPHORUS [id number] AND TROPONIN [id number] AND CK-MB [id number]  AND MAGNESIUM [id number] AND LIPID PANEL [id number] AND TSH [id number]  AND COMPREHENSIVE METABOLIC PANEL [id number]   LIPID PANEL - Abnormal; Notable for the following components:    Triglycerides 188 (*)     HDL 39 (*)     All other components within normal limits    Narrative:     ADD ON HEMOGLOBIN A1C [id number] PER CLAYTON TIJERINA MD  05:00    12/01/2019  ADD ON PHOSPHORUS [id number] AND TROPONIN [id number] AND CK-MB [id number]  AND MAGNESIUM [id number] AND LIPID PANEL [id number] AND TSH [id number]  AND COMPREHENSIVE METABOLIC PANEL [id number]   COMPREHENSIVE METABOLIC PANEL - Abnormal; Notable for the following components:    Glucose 161 (*)     AST 59 (*)     ALT 50 (*)     All other components within normal limits    Narrative:     ADD ON HEMOGLOBIN A1C [id number] PER CLAYTON TIJERINA MD 05:00    12/01/2019  ADD ON PHOSPHORUS [id number] AND TROPONIN [id number] AND CK-MB [id number]  AND MAGNESIUM [id number] AND LIPID PANEL [id number] AND TSH [id number]  AND COMPREHENSIVE METABOLIC PANEL [id number]   CBC W/ AUTO DIFFERENTIAL   CK-MB   COMPREHENSIVE METABOLIC PANEL   HEMOGLOBIN A1C   LIPID PANEL   MAGNESIUM   PHOSPHORUS   TROPONIN I   TSH   PHOSPHORUS    Narrative:     ADD ON HEMOGLOBIN A1C [id number] PER CLAYTON TIJERINA MD 05:00    12/01/2019  ADD ON PHOSPHORUS [id number] AND TROPONIN [id number] AND CK-MB [id number]  AND MAGNESIUM [id number] AND LIPID PANEL [id number] AND TSH [id number]  AND COMPREHENSIVE METABOLIC PANEL [id number]   TROPONIN I    Narrative:     ADD ON HEMOGLOBIN A1C [id number] PER CLAYTON TIJERINA MD 05:00    12/01/2019  ADD ON PHOSPHORUS [id number] AND TROPONIN [id number] AND CK-MB [id number]  AND MAGNESIUM [id number] AND LIPID PANEL [id number] AND TSH [id number]  AND COMPREHENSIVE METABOLIC PANEL [id number]   MAGNESIUM    Narrative:     ADD ON HEMOGLOBIN A1C [id number] PER CLAYTON TIJERINA MD 05:00    12/01/2019  ADD ON PHOSPHORUS [id number] AND TROPONIN [id number] AND CK-MB [id number]  AND MAGNESIUM [id number] AND LIPID PANEL [id number] AND TSH [id number]  AND COMPREHENSIVE METABOLIC PANEL [id number]   TSH    Narrative:     ADD ON HEMOGLOBIN A1C [id number] PER CLAYTON TIJERINA MD 05:00    12/01/2019  ADD ON PHOSPHORUS [id number] AND TROPONIN [id number] AND CK-MB [id number]  AND MAGNESIUM [id number] AND LIPID PANEL [id number] AND TSH [id number]  AND COMPREHENSIVE  METABOLIC PANEL [id number]   TYPE & SCREEN   POCT GLUCOSE MONITORING CONTINUOUS        ECG Results          EKG, 12 - Lead (In process)  Result time 12/01/19 06:22:10    In process by Interface, Lab In [place] (12/01/19 06:22:10)                 Narrative:    Test Reason : S06.5X9A    Vent. Rate : 090 BPM     Atrial Rate : 090 BPM     P-R Int : 190 ms          QRS Dur : 110 ms      QT Int : 362 ms       P-R-T Axes : 049 -24 055 degrees     QTc Int : 442 ms    Normal sinus rhythm  Voltage criteria for left ventricular hypertrophy  Abnormal ECG  No previous ECGs available    Referred By: SONALI SPENCER           Confirmed By:                             Imaging Results          X-Ray Chest AP Single View (Final result)  Result time 12/01/19 05:40:09    Final result by Marshall Griffith MD (12/01/19 05:40:09)                 Impression:      Radiographic appearance of crowding, associated with diminished depth of inspiration, otherwise without additional evidence for acute intrathoracic process.      Electronically signed by: Marshall Griffith  Date:    12/01/2019  Time:    05:40             Narrative:    EXAMINATION:  XR CHEST 1 VIEW    CLINICAL HISTORY:  eval pna;    TECHNIQUE:  Single frontal view of the chest was performed.    COMPARISON:  May 18, 2000    FINDINGS:  Single chest view is submitted there is diminished depth of inspiration and mild rotation these factors exaggerate the appearance of the cardiomediastinal silhouette which is otherwise thought stable.  Aortic atherosclerotic change however is mildly more prominent.  Accentuated pulmonary bronchovascular markings likely relate to diminished depth of inspiration and crowding, there is no evidence for dense consolidation, significant pleural effusion or pneumothorax.  The osseous structures demonstrate chronic change.                                CT Head Without Contrast (Final result)  Result time 12/01/19 04:48:10    Final result by Esteban Moody MD  (12/01/19 04:48:10)                 Impression:      Significant interval enlargement of acute subdural hemorrhage along the right cerebral convexity with worsening mass effect and new leftward midline shift of approximately 12 mm and partial effacement of the basal cisterns.  Urgent neurosurgical evaluation recommended.    This report was flagged in Epic as abnormal.    COMMUNICATION  This critical result was discovered/received at 04:38.  The critical information above was relayed directly by me by telephone to Dr. Hanson on 12/01/2019 at 04:40.      Electronically signed by: Esteban Moody MD  Date:    12/01/2019  Time:    04:48             Narrative:    EXAMINATION:  CT HEAD WITHOUT CONTRAST    CLINICAL HISTORY:  Intracranial hemorrhage;    TECHNIQUE:  Low dose axial images were obtained through the head.  Coronal and sagittal reformations were also performed. Contrast was not administered.    COMPARISON:  CT head, 12/01/2019 at 00:46.    FINDINGS:  There has been significant interval enlargement of previously seen subdural hematoma along the right cerebral convexity, now measuring approximately 13 mm in maximum dimension (coronal image 26).  There is worsening localized mass effect with effacement of the right cerebral sulci and partial effacement of the right lateral ventricle.  New leftward midline shift of approximately 12 mm.  New partial effacement of the basal cisterns.  Possible trace subdural hemorrhage along the anterior falx.  No evidence of territorial infarct.  No intraparenchymal hemorrhage.    Left lateral ventricle is minimally prominent with minimal flaring of the temporal horn.    No displaced calvarial fracture.    Visualized paranasal sinuses and mastoid air cells are stable without significant opacification or air-fluid levels.                                 Medical Decision Making:   History:   Old Medical Records: I decided to obtain old medical records.  Old Records Summarized: records  from another hospital.       <> Summary of Records: Patient underwent CT head found to have acute right subdural hematoma in the high right posterior parietal location at the vertex 6 mm. No midline shift. Associated scalp hematoma at the vertex without calvarial fracture. CT cervical spine found to have no acute cervical fracture. Remote deformity of the tip of the C7 spinous process.  Clinical Tests:   Lab Tests: Ordered and Reviewed  Other:   I discussed test(s) with the performing physician.       <> Summary of the Findings: 4:43 AM  Informed by radiology patient repeat CT head found to have 12 mm midline shift.   I have discussed this case with another health care provider.       <> Summary of the Discussion: Case discussed with neurosurgery and neuro critical care.        APC / Resident Notes:   Patient is a 64 year old male presents to the ED for emergent evaluation of SDH.     Will order labs and stat repeat CT head. Will continue to monitor.     Differential diagnoses include, but are not limited to: SDH, cerebral edema, intoxication, or electrolyte imbalance.     4:54 AM  Family at bedside. Wife denies hx of anticoagulation. Wife denies active chemo or radiation.   Patient Class A for craniotomy. Patient admitted to neuro critical care.     I have discussed and reviewed with my supervising physician.          Clinical Impression:       ICD-10-CM ICD-9-CM   1. Subdural hemorrhage I62.00 432.1   2. Alcoholic intoxication with complication F10.929 305.00         Disposition:   Disposition: Admitted  Condition: Critical                         Sunita Saini PA-C  12/01/19 0648

## 2019-12-01 NOTE — ED NOTES
Bed: 19  Expected date:   Expected time:   Means of arrival:   Comments:  [facility] [place] transfer

## 2019-12-01 NOTE — HPI
Mr. Linda is a 64 year old male with a PMHx of HTN, HLD, DM2, thyroid disease, anxiety, toe amputation left, and hx of prostate cancer presents as a transfer from [facility] to Neuro Critical Care s/p SDH after mechanical fall +blood alcohol level 216 and getting out of car and hitting head against concrete.  CT Head reveals acute subdural hemorrhage along the right cerebral convexity with worsening mass effect and new leftward midline shift of approximately 12 mm and partial effacement of the basal cisterns. On examination, patient is awake, alert, oriented X 2, follows simple commands, left sided weakness, and left facial droop. No reports of blood thinners.Patient will be Class A to the OR with NGSY. Patient will be admitted to Neuro Critical Care for a higher level of care.

## 2019-12-01 NOTE — ANESTHESIA POSTPROCEDURE EVALUATION
Anesthesia Post Evaluation    Patient: Jermaine Linda    Procedure(s) Performed: Procedure(s) (LRB):  Right CRANIOTOMY, FOR SUBDURAL HEMATOMA EVACUATION  (Right)    Final Anesthesia Type: general    Patient location during evaluation: PACU  Patient participation: Yes- Able to Participate  Level of consciousness: awake and alert  Post-procedure vital signs: reviewed and stable  Pain management: adequate  Airway patency: patent    PONV status at discharge: No PONV  Anesthetic complications: no      Cardiovascular status: hemodynamically stable  Respiratory status: unassisted, spontaneous ventilation and room air  Hydration status: euvolemic  Follow-up not needed.          Vitals Value Taken Time   /81 12/1/2019  5:10 PM   Temp 36.2 °C (97.2 °F) 12/1/2019 10:01 AM   Pulse 122 12/1/2019  5:43 PM   Resp 26 12/1/2019  5:43 PM   SpO2 99 % 12/1/2019  5:43 PM   Vitals shown include unvalidated device data.      No case tracking events are documented in the log.      Pain/Girma Score: Pain Rating Prior to Med Admin: 6 (12/1/2019 12:06 PM)

## 2019-12-01 NOTE — CARE UPDATE
Patient intubated via Rain GUERIN order. Patient tolerated procedure well and was placed on documented on provided documented vent settings. Patient resting comfortably. Ambu bag and mask are at bedside. Will continue to monitor.

## 2019-12-01 NOTE — ANESTHESIA PREPROCEDURE EVALUATION
[facility]  Anesthesia Pre-Operative Evaluation         Patient Name: Jermaine Linda  YOB: 1955  MRN: [id number]    SUBJECTIVE:     Pre-operative evaluation for Procedure(s) (LRB):  Right CRANIOTOMY, FOR SUBDURAL HEMATOMA EVACUATION Class A (Right)     12/01/2019    Jermaine Linda is a 64 y.o. male w/ a significant PMHx of HTN (on amlodipine-benazepril and Toprol-XL), HLD, DM2, hypothyroidism, obesity (BMI 30), anxiety, and hx of prostate cancer.  Pt presented to the ED as a transfer from OSH for traumatic SDH.  Pt not on anticoagulation.  CT head concerning for worsening mass effect with 1.2cm leftward midline shift.    Patient now presents for the above procedure(s).      LDA:       Peripheral IV - Single Lumen 12/01/19 0145 22 G Right Hand (Active)   Site Assessment Clean;Dry;Intact 12/1/2019  1:45 AM   Line Status Blood return noted;Capped;Saline locked;Flushed 12/1/2019  1:45 AM   Dressing Status Clean;Dry;Intact 12/1/2019  1:45 AM   Number of days: 0            Peripheral IV - Single Lumen 12/01/19 0223 20 G Right Antecubital (Active)   Site Assessment Clean;Dry;Intact 12/1/2019  2:23 AM   Line Status Blood return noted;Capped;Flushed;Saline locked 12/1/2019  2:23 AM   Dressing Status Clean;Dry;Intact 12/1/2019  2:23 AM   Number of days: 0            Peripheral IV - Single Lumen 12/01/19 0458 18 G Left Antecubital (Active)   Site Assessment Clean;Dry;Intact;No redness;No swelling 12/1/2019  4:59 AM   Line Status Blood return noted;Saline locked;Flushed 12/1/2019  4:59 AM   Dressing Status Clean;Dry;Intact 12/1/2019  4:59 AM   Dressing Intervention New dressing 12/1/2019  4:59 AM   Number of days: 0       Male External Urinary Catheter 12/01/19 0449 Medium (Active)   Number of days: 0       Prev airway: None documented.    Drips:    sodium chloride 0.9% 75 mL/hr at 12/01/19 0448       Patient Active Problem List   Diagnosis    SDH (subdural hematoma)       Review of  patient's allergies indicates:   Allergen Reactions    Shellfish containing products        Current Inpatient Medications:   folic acid (FOLVITE) IVPB  1 mg Intravenous Once    polyethylene glycol  17 g Oral Daily    thiamine (VITAMIN B1) IVPB  100 mg Intravenous Daily       No current facility-administered medications on file prior to encounter.      Current Outpatient Medications on File Prior to Encounter   Medication Sig Dispense Refill    amlodipine-benazepril 5-10 mg (LOTREL) 5-10 mg per capsule Take 1 capsule by mouth once daily.      ARIPiprazole (ABILIFY) 2 MG Tab Take 2 mg by mouth 2 (two) times daily.      atorvastatin (LIPITOR) 80 MG tablet Take 40 mg by mouth once daily.      bethanechol (URECHOLINE) 10 MG Tab Take 50 mg by mouth 3 (three) times daily.      clonazePAM (KLONOPIN) 1 MG tablet Take 1 mg by mouth 2 (two) times daily as needed for Anxiety.      escitalopram oxalate (LEXAPRO) 20 MG tablet Take 40 mg by mouth once daily.      fenofibrate 150 mg Cap Take 145 mg by mouth once daily.      imipramine (TOFRANIL) 50 MG tablet Take 100 mg by mouth every evening.      levothyroxine (SYNTHROID) 100 MCG tablet Take 100 mcg by mouth once daily.      metFORMIN (GLUCOPHAGE) 500 MG tablet Take 500 mg by mouth 2 (two) times daily with meals.      metoprolol succinate (TOPROL-XL) 50 MG 24 hr tablet Take 50 mg by mouth once daily.      montelukast (SINGULAIR) 10 mg tablet Take 10 mg by mouth every evening.      naproxen (NAPROSYN) 500 MG tablet Take 500 mg by mouth 2 (two) times daily as needed.      tiZANidine (ZANAFLEX) 4 MG tablet Take 4 mg by mouth every 12 (twelve) hours as needed.         History reviewed. No pertinent surgical history.    Social History     Socioeconomic History    Marital status:      Spouse name: Not on file    Number of children: Not on file    Years of education: Not on file    Highest education level: Not on file   Occupational History    Not on file    Social Needs    Financial resource strain: Not on file    Food insecurity:     Worry: Not on file     Inability: Not on file    Transportation needs:     Medical: Not on file     Non-medical: Not on file   Tobacco Use    Smoking status: Never Smoker    Smokeless tobacco: Never Used   Substance and Sexual Activity    Alcohol use: Not Currently    Drug use: Never    Sexual activity: Not Currently   Lifestyle    Physical activity:     Days per week: Not on file     Minutes per session: Not on file    Stress: Not on file   Relationships    Social connections:     Talks on phone: Not on file     Gets together: Not on file     Attends Scientologist service: Not on file     Active member of club or organization: Not on file     Attends meetings of clubs or organizations: Not on file     Relationship status: Not on file   Other Topics Concern    Not on file   Social History Narrative    Not on file       OBJECTIVE:     Vital Signs Range (Last 24H):  Temp:  [36.4 °C (97.6 °F)-36.6 °C (97.8 °F)]   Pulse:  [82-89]   Resp:  [16]   BP: (113-147)/(66-86)   SpO2:  [94 %-98 %]       Significant Labs:  Lab Results   Component Value Date    WBC 10.43 12/01/2019    HGB 12.8 (L) 12/01/2019    HCT 39.8 (L) 12/01/2019     12/01/2019    ALT 65 (H) 12/01/2019    AST 74 (H) 12/01/2019     12/01/2019    K 3.9 12/01/2019     12/01/2019    CREATININE 0.90 12/01/2019    BUN 18 12/01/2019    CO2 26 12/01/2019    INR 1.0 12/01/2019       Diagnostic Studies:   EXAMINATION:  CT HEAD WITHOUT CONTRAST    CLINICAL HISTORY:  Intracranial hemorrhage;    TECHNIQUE:  Low dose axial images were obtained through the head.  Coronal and sagittal reformations were also performed. Contrast was not administered.    COMPARISON:  CT head, 12/01/2019 at 00:46.    FINDINGS:  There has been significant interval enlargement of previously seen subdural hematoma along the right cerebral convexity, now measuring approximately 13 mm in  maximum dimension (coronal image 26).  There is worsening localized mass effect with effacement of the right cerebral sulci and partial effacement of the right lateral ventricle.  New leftward midline shift of approximately 12 mm.  New partial effacement of the basal cisterns.  Possible trace subdural hemorrhage along the anterior falx.  No evidence of territorial infarct.  No intraparenchymal hemorrhage.    Left lateral ventricle is minimally prominent with minimal flaring of the temporal horn.    No displaced calvarial fracture.    Visualized paranasal sinuses and mastoid air cells are stable without significant opacification or air-fluid levels.      Impression       Significant interval enlargement of acute subdural hemorrhage along the right cerebral convexity with worsening mass effect and new leftward midline shift of approximately 12 mm and partial effacement of the basal cisterns.  Urgent neurosurgical evaluation recommended.    This report was flagged in Epic as abnormal.    COMMUNICATION  This critical result was discovered/received at 04:38.  The critical information above was relayed directly by me by telephone to Dr. Hanson on 12/01/2019 at 04:40.      Electronically signed by: Esteban Moody MD  Date: 12/01/2019  Time: 04:48         EKG:   No results found for this or any previous visit.    2D ECHO:  TTE:  No results found for this or any previous visit.    JOVANA:  No results found for this or any previous visit.    ASSESSMENT/PLAN:     12/01/2019  Jermaine Linda is a 64 y.o., male.    Anesthesia Evaluation    I have reviewed the Patient Summary Reports.    I have reviewed the Nursing Notes.      Review of Systems  Anesthesia Hx:  No problems with previous Anesthesia    Hematology/Oncology:         -- Anemia: Hematology Comments: 12.8 /   --  Cancer in past history:  Other (see Oncology comments) Oncology Comments: Prostate     EENT/Dental:EENT/Dental Normal   Cardiovascular:   Hypertension  hyperlipidemia    Pulmonary:   Denies COPD.  Denies Asthma.    Hepatic/GI:  Hepatic/GI Normal  Denies GERD.    Neurological:   Denies Seizures.  CNS Hemorrhage  (Central Nervous System), Intracranial Hemorrhage , IVH Grade Subdural Hematoma, Acute Subdural Hematoma (< 48 Hours)    Endocrine:   Diabetes, type 2 Hypothyroidism        Physical Exam  General:  Well nourished, Obesity    Airway/Jaw/Neck:  Airway Findings: Mouth Opening: Small, but > 3cm Tongue: Normal  General Airway Assessment: Adult  Mallampati: IV  TM Distance: 4 - 6 cm  Jaw/Neck Findings:  Neck ROM: Normal ROM  Neck Findings:  Short Neck, Girth Increased     Eyes/Ears/Nose:  EYES/EARS/NOSE FINDINGS: Normal    Chest/Lungs:  Chest/Lungs Findings: Clear to auscultation, Normal Respiratory Rate     Heart/Vascular:  Heart Findings: Rate: Normal  Rhythm: Regular Rhythm  Sounds: Normal        Mental Status:  Mental Status Findings:  Confusion, Cooperative         Anesthesia Plan  Type of Anesthesia, risks & benefits discussed:  Anesthesia Type:  general  Patient's Preference:   Intra-op Monitoring Plan: arterial line and standard ASA monitors  Intra-op Monitoring Plan Comments:   Post Op Pain Control Plan: multimodal analgesia and IV/PO Opioids PRN  Post Op Pain Control Plan Comments:   Induction:   IV  Beta Blocker:  Patient is on a Beta-Blocker and has received one dose within the past 24 hours (No further documentation required).       Informed Consent: Patient representative understands risks and agrees with Anesthesia plan.  Questions answered. Anesthesia consent signed with patient representative.  ASA Score: 4  emergent   Day of Surgery Review of History & Physical:    H&P update referred to the surgeon.         Ready For Surgery From Anesthesia Perspective.

## 2019-12-01 NOTE — H&P
[facility]  Neurocritical Care  History & Physical    Admit Date: 12/1/2019  Service Date: 12/01/2019  Length of Stay: 0    Subjective:     Chief Complaint: SDH (subdural hematoma)    History of Present Illness: Mr. Linda is a 64 year old male with a PMHx of HTN, HLD, DM2, thyroid disease, anxiety, toe amputation left, and hx of prostate cancer presents as a transfer from [facility] to Neuro Critical Care s/p SDH after mechanical fall +blood alcohol level 216 and getting out of car and hitting head against concrete.  CT Head reveals acute subdural hemorrhage along the right cerebral convexity with worsening mass effect and new leftward midline shift of approximately 12 mm and partial effacement of the basal cisterns. On examination, patient is awake, alert, oriented X 2, follows simple commands, left sided weakness, and left facial droop. No reports of blood thinners.Patient will be Class A to the OR with NGSY. Patient will be admitted to Neuro Critical Care for a higher level of care.     Past Medical History:   Diagnosis Date    Anxiety     Cancer     Diabetes mellitus     Hypertension      History reviewed. No pertinent surgical history.   No current facility-administered medications on file prior to encounter.      Current Outpatient Medications on File Prior to Encounter   Medication Sig Dispense Refill    amlodipine-benazepril 5-10 mg (LOTREL) 5-10 mg per capsule Take 1 capsule by mouth once daily.      ARIPiprazole (ABILIFY) 2 MG Tab Take 2 mg by mouth 2 (two) times daily.      atorvastatin (LIPITOR) 80 MG tablet Take 40 mg by mouth once daily.      bethanechol (URECHOLINE) 10 MG Tab Take 50 mg by mouth 3 (three) times daily.      clonazePAM (KLONOPIN) 1 MG tablet Take 1 mg by mouth 2 (two) times daily as needed for Anxiety.      escitalopram oxalate (LEXAPRO) 20 MG tablet Take 40 mg by mouth once daily.      fenofibrate 150 mg Cap Take 145 mg by mouth once daily.       imipramine (TOFRANIL) 50 MG tablet Take 100 mg by mouth every evening.      levothyroxine (SYNTHROID) 100 MCG tablet Take 100 mcg by mouth once daily.      metFORMIN (GLUCOPHAGE) 500 MG tablet Take 500 mg by mouth 2 (two) times daily with meals.      metoprolol succinate (TOPROL-XL) 50 MG 24 hr tablet Take 50 mg by mouth once daily.      montelukast (SINGULAIR) 10 mg tablet Take 10 mg by mouth every evening.      naproxen (NAPROSYN) 500 MG tablet Take 500 mg by mouth 2 (two) times daily as needed.      tiZANidine (ZANAFLEX) 4 MG tablet Take 4 mg by mouth every 12 (twelve) hours as needed.        Allergies: Shellfish containing products    History reviewed. No pertinent family history.  Social History     Tobacco Use    Smoking status: Never Smoker    Smokeless tobacco: Never Used   Substance Use Topics    Alcohol use: Not Currently    Drug use: Never     Review of Systems   Review of symptoms + headache  Constitutional: Denies fevers or chills.  Pulmonary: Denies shortness of breath or cough.  Cardiology: Denies chest pain or palpitations.  GI: Denies abdominal pain or constipation.  Neurologic: Denies new weakness,  headache, or paresthesias.  Objective:     Vitals:    Temp: 97.6 °F (36.4 °C)  Pulse: 85  BP: 133/70  MAP (mmHg): 96  Resp: 16  SpO2: 98 %  O2 Device (Oxygen Therapy): room air    Temp  Min: 97.6 °F (36.4 °C)  Max: 97.8 °F (36.6 °C)  Pulse  Min: 82  Max: 89  BP  Min: 113/66  Max: 147/86  MAP (mmHg)  Min: 84  Max: 108  Resp  Min: 16  Max: 16  SpO2  Min: 94 %  Max: 98 %    No intake/output data recorded.           Physical Exam   Physical Exam:  GA: Awake, Alert, Oriented X 2, follows simple commands, right facial droop, dysarthria, left sided weakness  comfortable, no acute distress.   HEENT: No scleral icterus or JVD.   Pulmonary: Clear to auscultation Anterior.  Cardiac: RRR S1 & S2 w/o rubs/murmurs/gallops.   Abdominal: Bowel sounds present x 4. No appreciable hepatosplenomegaly.  Skin: +  visible lesion in back of head   Neuro:  --GCS: E4 V5 M4  --Mental Status:  Awake, Alert, Oriented X 2, dysarthria,  follows simple commands, left facial droop,    --CN II-XII unable to fully assess  --Pupils 3-4mm, PERRL.   --Corneal reflex, gag, cough intact.  -- Right facial droop  -- Left sided weakness    Unable to test coordination, gait due to level of consciousness.    Today I personally reviewed pertinent medications, imaging, laboratory results, notably:        Assessment/Plan:     Neuro  * SDH (subdural hematoma)  Right Traumatic SDH   --Continue Neuro checks q 1hr  -- Neurosurgery consulted  -- CT Head reveals acute subdural hemorrhage along the right cerebral convexity with worsening mass effect andleftward midline shift of approximately 12 mm and partial effacement of the basal cisterns.  -- SBP goal less than 140  -- Keppra 500 mg BID  -- Class A to the OR  -- Pending further recommendations per NGSY        Cerebral edema  Continue to monitor with serial CT scans.       Seizure prophylaxis  Seizure Prophylaxis  -- Keppra 500 mg BID     Cardiac/Vascular  Hyperlipidemia  --lipid panel pending  -- Atorvastatin 80 mg daily     Essential hypertension  Hypertension  -- Continue to monitor HR and BP   --SBP goal < 140  -- 2D echo pending  --Continue home med amlodipine-benazepril 5-10 daily      Endocrine  Hypothyroidism  -TSH pending  - Continue levothyroxine 100 mcg daily     Diabetes mellitus  -- [id number] pending   -- SSI           The patient is being Prophylaxed for:  Venous Thromboembolism with: Mechanical  Stress Ulcer with: None  Ventilator Pneumonia with: none    Activity Orders          Diet NPO: NPO starting at 12/01 0436        Full Code      Uninterrupted Critical Care/Counseling Time (not including procedures): >55 min   I Spent >55 min reviewing patient records, examining, and counseling the patient with greater than 50% of time spent with direct patient care and coordination.       Naty PATHAK  NAKUL Alexander  Neurocritical Care  [facility]

## 2019-12-01 NOTE — NURSING
1345- Pt became very agitated and confused.  Pt repeating inappropriate words.  In&out cath done in hopes of reducing agitation. 1500 ml out.    1400- Kiersten, NP called to bedside for sustained HR in 140s and still agitated.  Ordered Precedex drip and 25 Fentanyl.     1425- Dr. Rodrigez at bedside.  Precedex titrated to 1.  Ordered to retrieve valium.  Before valium arrived at bedside, pt experienced 30 sec left sided focal seizure.  Pt turned on side and nasal trumpet placed for oxygenation.  2mg Ativan given as ordered.  Post ictal, pt tachypniec, tachycardic, and hypotensive.  1L NS bolus given.  Belly breathing and use of accessory muscles.  Planned for CT when pt stabilizes.  Will continue to monitor.

## 2019-12-01 NOTE — ASSESSMENT & PLAN NOTE
64yom w pmh of HTN, HLD, DM2, thyroid disease, anxiety, and hx of prostate cancer presents after mechanical fall getting out of car and hitting head against concrete [facility] to have R SDH. pt transferred to [facility] and repeat CTH showed worsening SDH with icreased mass effect, and decline in mental status in ED.     Plan:  Admit NCC  Plan for OR, Class A for R hemicraniotomy, possible craniectomy for SDH evacuation  Q1hr neurochecks  HOB>30  Close intubation watch  Monitor Na  SBP<140  CBC, CMP, coags, Type and screen  Keppra for seizure prophylaxis  Please contact with any sudden neuro changes.

## 2019-12-01 NOTE — CARE UPDATE
Patient transported to CT on transport vent. Patient tolerated trip well with no incidents. Patient now back on documented vent settings. Vent settings changed per Rain GUERIN order. Ambu bag and mask at bedside. Will continue to monitor.

## 2019-12-01 NOTE — TRANSFER OF CARE
Anesthesia Transfer of Care Note    Patient: Jermaine Linda    Procedure(s) Performed: Procedure(s) (LRB):  Right CRANIOTOMY, FOR SUBDURAL HEMATOMA EVACUATION  (Right)    Patient location: PACU    Anesthesia Type: general    Transport from OR: Transported from OR on 6-10 L/min O2 by face mask with adequate spontaneous ventilation. Continuous SpO2 monitoring in transport. Continuous ECG monitoring in transport. Continuos invasive BP monitoring in transport    Post pain: adequate analgesia    Post assessment: no apparent anesthetic complications and tolerated procedure well    Post vital signs: stable    Level of consciousness: awake and alert    Nausea/Vomiting: no nausea/vomiting    Complications: none    Transfer of care protocol was followed      Last vitals:   Visit Vitals  /70   Pulse 85   Temp 36.4 °C (97.6 °F) (Axillary)   Resp 16   Wt 102.1 kg (225 lb)   SpO2 98%   BMI 30.52 kg/m²

## 2019-12-01 NOTE — SUBJECTIVE & OBJECTIVE
Past Medical History:   Diagnosis Date    Anxiety     Cancer     Diabetes mellitus     Hypertension      History reviewed. No pertinent surgical history.   No current facility-administered medications on file prior to encounter.      Current Outpatient Medications on File Prior to Encounter   Medication Sig Dispense Refill    amlodipine-benazepril 5-10 mg (LOTREL) 5-10 mg per capsule Take 1 capsule by mouth once daily.      ARIPiprazole (ABILIFY) 2 MG Tab Take 2 mg by mouth 2 (two) times daily.      atorvastatin (LIPITOR) 80 MG tablet Take 40 mg by mouth once daily.      bethanechol (URECHOLINE) 10 MG Tab Take 50 mg by mouth 3 (three) times daily.      clonazePAM (KLONOPIN) 1 MG tablet Take 1 mg by mouth 2 (two) times daily as needed for Anxiety.      escitalopram oxalate (LEXAPRO) 20 MG tablet Take 40 mg by mouth once daily.      fenofibrate 150 mg Cap Take 145 mg by mouth once daily.      imipramine (TOFRANIL) 50 MG tablet Take 100 mg by mouth every evening.      levothyroxine (SYNTHROID) 100 MCG tablet Take 100 mcg by mouth once daily.      metFORMIN (GLUCOPHAGE) 500 MG tablet Take 500 mg by mouth 2 (two) times daily with meals.      metoprolol succinate (TOPROL-XL) 50 MG 24 hr tablet Take 50 mg by mouth once daily.      montelukast (SINGULAIR) 10 mg tablet Take 10 mg by mouth every evening.      naproxen (NAPROSYN) 500 MG tablet Take 500 mg by mouth 2 (two) times daily as needed.      tiZANidine (ZANAFLEX) 4 MG tablet Take 4 mg by mouth every 12 (twelve) hours as needed.        Allergies: Shellfish containing products    History reviewed. No pertinent family history.  Social History     Tobacco Use    Smoking status: Never Smoker    Smokeless tobacco: Never Used   Substance Use Topics    Alcohol use: Not Currently    Drug use: Never     Review of Systems   Review of symptoms + headache  Constitutional: Denies fevers or chills.  Pulmonary: Denies shortness of breath or cough.  Cardiology:  Denies chest pain or palpitations.  GI: Denies abdominal pain or constipation.  Neurologic: Denies new weakness,  headache, or paresthesias.  Objective:     Vitals:    Temp: 97.6 °F (36.4 °C)  Pulse: 85  BP: 133/70  MAP (mmHg): 96  Resp: 16  SpO2: 98 %  O2 Device (Oxygen Therapy): room air    Temp  Min: 97.6 °F (36.4 °C)  Max: 97.8 °F (36.6 °C)  Pulse  Min: 82  Max: 89  BP  Min: 113/66  Max: 147/86  MAP (mmHg)  Min: 84  Max: 108  Resp  Min: 16  Max: 16  SpO2  Min: 94 %  Max: 98 %    No intake/output data recorded.           Physical Exam   Physical Exam:  GA: Awake, Alert, Oriented X 2, follows simple commands, right facial droop, dysarthria, left sided weakness  comfortable, no acute distress.   HEENT: No scleral icterus or JVD.   Pulmonary: Clear to auscultation Anterior.  Cardiac: RRR S1 & S2 w/o rubs/murmurs/gallops.   Abdominal: Bowel sounds present x 4. No appreciable hepatosplenomegaly.  Skin: + visible lesion in back of head   Neuro:  --GCS: E4 V5 M4  --Mental Status:  Awake, Alert, Oriented X 2, dysarthria,  follows simple commands, left facial droop,    --CN II-XII unable to fully assess  --Pupils 3-4mm, PERRL.   --Corneal reflex, gag, cough intact.  -- Right facial droop  -- Left sided weakness    Unable to test coordination, gait due to level of consciousness.    Today I personally reviewed pertinent medications, imaging, laboratory results, notably:

## 2019-12-01 NOTE — EICU
"carolann received for "time out" verification prior to intubation.  Dr Rodrigez @ bs for procedure.  Time out completed.  Intubated with size 8.0 ett, +etCO2 color change.  CXR ordered.    1551:  150 ST, 130/63, 32, 94%  1553:  152 ST, 136/70, 40, 98%  1555:  40 mg etomidate ivp per md order  1556:  100 mg rocuronium ivp per md order  1557:  159 ST, 135/60, 16, 97%  1558:  Intubated    Procedure end 1602  "

## 2019-12-02 LAB
ALBUMIN SERPL BCP-MCNC: 3.1 G/DL (ref 3.5–5.2)
ALLENS TEST: ABNORMAL
ALP SERPL-CCNC: 39 U/L (ref 55–135)
ALT SERPL W/O P-5'-P-CCNC: 36 U/L (ref 10–44)
ANION GAP SERPL CALC-SCNC: 7 MMOL/L (ref 8–16)
APTT BLDCRRT: 24.1 SEC (ref 21–32)
ASCENDING AORTA: 3.53 CM
AST SERPL-CCNC: 41 U/L (ref 10–40)
AV INDEX (PROSTH): 0.95
AV MEAN GRADIENT: 5 MMHG
AV PEAK GRADIENT: 8 MMHG
AV VALVE AREA: 5 CM2
AV VELOCITY RATIO: 0.87
BASOPHILS # BLD AUTO: 0.02 K/UL (ref 0–0.2)
BASOPHILS NFR BLD: 0.2 % (ref 0–1.9)
BILIRUB SERPL-MCNC: 0.5 MG/DL (ref 0.1–1)
BSA FOR ECHO PROCEDURE: 2.28 M2
BUN SERPL-MCNC: 11 MG/DL (ref 8–23)
CALCIUM SERPL-MCNC: 7.6 MG/DL (ref 8.7–10.5)
CHLORIDE SERPL-SCNC: 106 MMOL/L (ref 95–110)
CK SERPL-CCNC: 1539 U/L (ref 20–200)
CO2 SERPL-SCNC: 26 MMOL/L (ref 23–29)
CREAT SERPL-MCNC: 0.8 MG/DL (ref 0.5–1.4)
CV ECHO LV RWT: 0.45 CM
DELSYS: ABNORMAL
DIFFERENTIAL METHOD: ABNORMAL
DOP CALC AO PEAK VEL: 1.41 M/S
DOP CALC AO VTI: 23.08 CM
DOP CALC LVOT AREA: 5.3 CM2
DOP CALC LVOT DIAMETER: 2.59 CM
DOP CALC LVOT PEAK VEL: 1.23 M/S
DOP CALC LVOT STROKE VOLUME: 115.43 CM3
DOP CALCLVOT PEAK VEL VTI: 21.92 CM
E WAVE DECELERATION TIME: 145.98 MSEC
E/A RATIO: 1.44
ECHO LV POSTERIOR WALL: 1.06 CM (ref 0.6–1.1)
EOSINOPHIL # BLD AUTO: 0 K/UL (ref 0–0.5)
EOSINOPHIL NFR BLD: 0.2 % (ref 0–8)
ERYTHROCYTE [DISTWIDTH] IN BLOOD BY AUTOMATED COUNT: 14.5 % (ref 11.5–14.5)
ERYTHROCYTE [SEDIMENTATION RATE] IN BLOOD BY WESTERGREN METHOD: 16 MM/H
EST. GFR  (AFRICAN AMERICAN): >60 ML/MIN/1.73 M^2
EST. GFR  (NON AFRICAN AMERICAN): >60 ML/MIN/1.73 M^2
ETHANOL SERPL-MCNC: <10 MG/DL
FIO2: 40
FRACTIONAL SHORTENING: 24 % (ref 28–44)
GLUCOSE SERPL-MCNC: 126 MG/DL (ref 70–110)
HCO3 UR-SCNC: 26.8 MMOL/L (ref 24–28)
HCT VFR BLD AUTO: 27.6 % (ref 40–54)
HGB BLD-MCNC: 9.1 G/DL (ref 14–18)
IMM GRANULOCYTES # BLD AUTO: 0.03 K/UL (ref 0–0.04)
IMM GRANULOCYTES NFR BLD AUTO: 0.3 % (ref 0–0.5)
INR PPP: 1 (ref 0.8–1.2)
INTERVENTRICULAR SEPTUM: 1.15 CM (ref 0.6–1.1)
LA MAJOR: 5.19 CM
LA MINOR: 5.27 CM
LA WIDTH: 3.73 CM
LEFT ATRIUM SIZE: 2.57 CM
LEFT ATRIUM VOLUME INDEX: 19 ML/M2
LEFT ATRIUM VOLUME: 42.61 CM3
LEFT INTERNAL DIMENSION IN SYSTOLE: 3.54 CM (ref 2.1–4)
LEFT VENTRICLE DIASTOLIC VOLUME INDEX: 45.29 ML/M2
LEFT VENTRICLE DIASTOLIC VOLUME: 101.44 ML
LEFT VENTRICLE MASS INDEX: 84 G/M2
LEFT VENTRICLE SYSTOLIC VOLUME INDEX: 23.4 ML/M2
LEFT VENTRICLE SYSTOLIC VOLUME: 52.34 ML
LEFT VENTRICULAR INTERNAL DIMENSION IN DIASTOLE: 4.68 CM (ref 3.5–6)
LEFT VENTRICULAR MASS: 187.45 G
LV LATERAL E/E' RATIO: 9.82 M/S
LYMPHOCYTES # BLD AUTO: 1.5 K/UL (ref 1–4.8)
LYMPHOCYTES NFR BLD: 16.9 % (ref 18–48)
MAGNESIUM SERPL-MCNC: 2 MG/DL (ref 1.6–2.6)
MCH RBC QN AUTO: 31.9 PG (ref 27–31)
MCHC RBC AUTO-ENTMCNC: 33 G/DL (ref 32–36)
MCV RBC AUTO: 97 FL (ref 82–98)
MODE: ABNORMAL
MONOCYTES # BLD AUTO: 0.9 K/UL (ref 0.3–1)
MONOCYTES NFR BLD: 10.5 % (ref 4–15)
MV PEAK A VEL: 0.75 M/S
MV PEAK E VEL: 1.08 M/S
NEUTROPHILS # BLD AUTO: 6.4 K/UL (ref 1.8–7.7)
NEUTROPHILS NFR BLD: 71.9 % (ref 38–73)
NRBC BLD-RTO: 0 /100 WBC
PCO2 BLDA: 43.5 MMHG (ref 35–45)
PEEP: 10
PH SMN: 7.4 [PH] (ref 7.35–7.45)
PHOSPHATE SERPL-MCNC: 2.2 MG/DL (ref 2.7–4.5)
PLATELET # BLD AUTO: 192 K/UL (ref 150–350)
PMV BLD AUTO: 10.4 FL (ref 9.2–12.9)
PO2 BLDA: 129 MMHG (ref 80–100)
POC BE: 2 MMOL/L
POC SATURATED O2: 99 % (ref 95–100)
POC TCO2: 28 MMOL/L (ref 23–27)
POCT GLUCOSE: 127 MG/DL (ref 70–110)
POCT GLUCOSE: 131 MG/DL (ref 70–110)
POTASSIUM SERPL-SCNC: 3.8 MMOL/L (ref 3.5–5.1)
PROT SERPL-MCNC: 5.7 G/DL (ref 6–8.4)
PROTHROMBIN TIME: 10.1 SEC (ref 9–12.5)
RA MAJOR: 5.02 CM
RA WIDTH: 3.29 CM
RBC # BLD AUTO: 2.85 M/UL (ref 4.6–6.2)
RV TISSUE DOPPLER FREE WALL SYSTOLIC VELOCITY 1 (APICAL 4 CHAMBER VIEW): 19.12 CM/S
SAMPLE: ABNORMAL
SINUS: 3.36 CM
SITE: ABNORMAL
SODIUM SERPL-SCNC: 139 MMOL/L (ref 136–145)
STJ: 3.19 CM
TDI LATERAL: 0.11 M/S
VT: 450
WBC # BLD AUTO: 8.85 K/UL (ref 3.9–12.7)

## 2019-12-02 PROCEDURE — 80320 DRUG SCREEN QUANTALCOHOLS: CPT

## 2019-12-02 PROCEDURE — 85610 PROTHROMBIN TIME: CPT

## 2019-12-02 PROCEDURE — 63600175 PHARM REV CODE 636 W HCPCS: Performed by: PHYSICIAN ASSISTANT

## 2019-12-02 PROCEDURE — 63600175 PHARM REV CODE 636 W HCPCS: Performed by: NURSE PRACTITIONER

## 2019-12-02 PROCEDURE — 63600175 PHARM REV CODE 636 W HCPCS: Performed by: STUDENT IN AN ORGANIZED HEALTH CARE EDUCATION/TRAINING PROGRAM

## 2019-12-02 PROCEDURE — 83735 ASSAY OF MAGNESIUM: CPT

## 2019-12-02 PROCEDURE — 87077 CULTURE AEROBIC IDENTIFY: CPT

## 2019-12-02 PROCEDURE — 27000221 HC OXYGEN, UP TO 24 HOURS

## 2019-12-02 PROCEDURE — 99900035 HC TECH TIME PER 15 MIN (STAT)

## 2019-12-02 PROCEDURE — 87186 SC STD MICRODIL/AGAR DIL: CPT

## 2019-12-02 PROCEDURE — 87040 BLOOD CULTURE FOR BACTERIA: CPT

## 2019-12-02 PROCEDURE — 87070 CULTURE OTHR SPECIMN AEROBIC: CPT

## 2019-12-02 PROCEDURE — 95951 PR EEG MONITORING/VIDEORECORD: ICD-10-PCS | Mod: 26,,, | Performed by: PSYCHIATRY & NEUROLOGY

## 2019-12-02 PROCEDURE — 94003 VENT MGMT INPAT SUBQ DAY: CPT

## 2019-12-02 PROCEDURE — 25000003 PHARM REV CODE 250: Performed by: NURSE PRACTITIONER

## 2019-12-02 PROCEDURE — C9113 INJ PANTOPRAZOLE SODIUM, VIA: HCPCS | Performed by: STUDENT IN AN ORGANIZED HEALTH CARE EDUCATION/TRAINING PROGRAM

## 2019-12-02 PROCEDURE — 99024 PR POST-OP FOLLOW-UP VISIT: ICD-10-PCS | Mod: GC,,, | Performed by: NEUROLOGICAL SURGERY

## 2019-12-02 PROCEDURE — 80053 COMPREHEN METABOLIC PANEL: CPT

## 2019-12-02 PROCEDURE — 94761 N-INVAS EAR/PLS OXIMETRY MLT: CPT

## 2019-12-02 PROCEDURE — 95951 HC EEG MONITORING/VIDEO RECORD: CPT

## 2019-12-02 PROCEDURE — 99291 CRITICAL CARE FIRST HOUR: CPT | Mod: ,,, | Performed by: NURSE PRACTITIONER

## 2019-12-02 PROCEDURE — 31500 INSERT EMERGENCY AIRWAY: CPT

## 2019-12-02 PROCEDURE — 95951 PR EEG MONITORING/VIDEORECORD: CPT | Mod: 26,,, | Performed by: PSYCHIATRY & NEUROLOGY

## 2019-12-02 PROCEDURE — 84100 ASSAY OF PHOSPHORUS: CPT

## 2019-12-02 PROCEDURE — 37799 UNLISTED PX VASCULAR SURGERY: CPT

## 2019-12-02 PROCEDURE — 87205 SMEAR GRAM STAIN: CPT

## 2019-12-02 PROCEDURE — 82803 BLOOD GASES ANY COMBINATION: CPT

## 2019-12-02 PROCEDURE — 85025 COMPLETE CBC W/AUTO DIFF WBC: CPT

## 2019-12-02 PROCEDURE — 99900026 HC AIRWAY MAINTENANCE (STAT)

## 2019-12-02 PROCEDURE — 99024 POSTOP FOLLOW-UP VISIT: CPT | Mod: GC,,, | Performed by: NEUROLOGICAL SURGERY

## 2019-12-02 PROCEDURE — 85730 THROMBOPLASTIN TIME PARTIAL: CPT

## 2019-12-02 PROCEDURE — 82550 ASSAY OF CK (CPK): CPT

## 2019-12-02 PROCEDURE — 99291 PR CRITICAL CARE, E/M 30-74 MINUTES: ICD-10-PCS | Mod: ,,, | Performed by: NURSE PRACTITIONER

## 2019-12-02 PROCEDURE — 20000000 HC ICU ROOM

## 2019-12-02 PROCEDURE — 51798 US URINE CAPACITY MEASURE: CPT

## 2019-12-02 PROCEDURE — 31720 CLEARANCE OF AIRWAYS: CPT

## 2019-12-02 PROCEDURE — 27200966 HC CLOSED SUCTION SYSTEM

## 2019-12-02 RX ORDER — FENTANYL CITRATE 50 UG/ML
25 INJECTION, SOLUTION INTRAMUSCULAR; INTRAVENOUS ONCE
Status: COMPLETED | OUTPATIENT
Start: 2019-12-02 | End: 2019-12-02

## 2019-12-02 RX ORDER — CLONAZEPAM 0.5 MG/1
1 TABLET ORAL 2 TIMES DAILY PRN
Status: DISCONTINUED | OUTPATIENT
Start: 2019-12-02 | End: 2019-12-05

## 2019-12-02 RX ORDER — METOPROLOL TARTRATE 25 MG/1
25 TABLET, FILM COATED ORAL 2 TIMES DAILY
Status: DISCONTINUED | OUTPATIENT
Start: 2019-12-02 | End: 2019-12-05

## 2019-12-02 RX ORDER — HYDROCODONE BITARTRATE AND ACETAMINOPHEN 7.5; 325 MG/1; MG/1
1 TABLET ORAL EVERY 12 HOURS PRN
Status: ON HOLD | COMMUNITY
End: 2019-12-06 | Stop reason: HOSPADM

## 2019-12-02 RX ORDER — PANTOPRAZOLE SODIUM 40 MG/1
40 FOR SUSPENSION ORAL DAILY
Status: DISCONTINUED | OUTPATIENT
Start: 2019-12-03 | End: 2019-12-04

## 2019-12-02 RX ORDER — DIAZEPAM 5 MG/1
5 TABLET ORAL EVERY 8 HOURS
Status: DISCONTINUED | OUTPATIENT
Start: 2019-12-02 | End: 2019-12-04

## 2019-12-02 RX ORDER — HEPARIN SODIUM 5000 [USP'U]/ML
5000 INJECTION, SOLUTION INTRAVENOUS; SUBCUTANEOUS EVERY 8 HOURS
Status: DISCONTINUED | OUTPATIENT
Start: 2019-12-03 | End: 2019-12-06 | Stop reason: HOSPADM

## 2019-12-02 RX ORDER — CHLORHEXIDINE GLUCONATE ORAL RINSE 1.2 MG/ML
15 SOLUTION DENTAL 2 TIMES DAILY
Status: DISCONTINUED | OUTPATIENT
Start: 2019-12-02 | End: 2019-12-04

## 2019-12-02 RX ORDER — BISACODYL 10 MG
10 SUPPOSITORY, RECTAL RECTAL DAILY
Status: DISCONTINUED | OUTPATIENT
Start: 2019-12-02 | End: 2019-12-06 | Stop reason: HOSPADM

## 2019-12-02 RX ADMIN — AMLODIPINE AND BENAZEPRIL HYDROCHLORIDE 1 CAPSULE: 5; 10 CAPSULE ORAL at 08:12

## 2019-12-02 RX ADMIN — CHLORHEXIDINE GLUCONATE 0.12% ORAL RINSE 15 ML: 1.2 LIQUID ORAL at 08:12

## 2019-12-02 RX ADMIN — DIAZEPAM 5 MG: 5 TABLET ORAL at 09:12

## 2019-12-02 RX ADMIN — PROPOFOL 50 MCG/KG/MIN: 10 INJECTION, EMULSION INTRAVENOUS at 11:12

## 2019-12-02 RX ADMIN — MUPIROCIN 1 G: 20 OINTMENT TOPICAL at 08:12

## 2019-12-02 RX ADMIN — BISACODYL 10 MG RECTAL SUPPOSITORY 10 MG: at 10:12

## 2019-12-02 RX ADMIN — FENTANYL CITRATE 25 MCG: 50 INJECTION INTRAMUSCULAR; INTRAVENOUS at 11:12

## 2019-12-02 RX ADMIN — HYDROCODONE BITARTRATE AND ACETAMINOPHEN 1 TABLET: 5; 325 TABLET ORAL at 05:12

## 2019-12-02 RX ADMIN — PROPOFOL 50 MCG/KG/MIN: 10 INJECTION, EMULSION INTRAVENOUS at 03:12

## 2019-12-02 RX ADMIN — CEFAZOLIN 2 G: 1 INJECTION, POWDER, FOR SOLUTION INTRAMUSCULAR; INTRAVENOUS at 05:12

## 2019-12-02 RX ADMIN — FENTANYL CITRATE 25 MCG: 50 INJECTION INTRAMUSCULAR; INTRAVENOUS at 07:12

## 2019-12-02 RX ADMIN — FENTANYL CITRATE 25 MCG: 50 INJECTION INTRAMUSCULAR; INTRAVENOUS at 06:12

## 2019-12-02 RX ADMIN — FENTANYL CITRATE 25 MCG: 50 INJECTION INTRAMUSCULAR; INTRAVENOUS at 01:12

## 2019-12-02 RX ADMIN — PROPOFOL 50 MCG/KG/MIN: 10 INJECTION, EMULSION INTRAVENOUS at 01:12

## 2019-12-02 RX ADMIN — CEFAZOLIN 2 G: 1 INJECTION, POWDER, FOR SOLUTION INTRAMUSCULAR; INTRAVENOUS at 09:12

## 2019-12-02 RX ADMIN — FENTANYL CITRATE 25 MCG: 50 INJECTION INTRAMUSCULAR; INTRAVENOUS at 09:12

## 2019-12-02 RX ADMIN — THIAMINE HYDROCHLORIDE 100 MG: 100 INJECTION, SOLUTION INTRAMUSCULAR; INTRAVENOUS at 08:12

## 2019-12-02 RX ADMIN — FENTANYL CITRATE 25 MCG: 50 INJECTION INTRAMUSCULAR; INTRAVENOUS at 03:12

## 2019-12-02 RX ADMIN — LEVETIRACETAM 1000 MG: 10 INJECTION INTRAVENOUS at 08:12

## 2019-12-02 RX ADMIN — METOPROLOL TARTRATE 25 MG: 25 TABLET ORAL at 10:12

## 2019-12-02 RX ADMIN — PROPOFOL 50 MCG/KG/MIN: 10 INJECTION, EMULSION INTRAVENOUS at 07:12

## 2019-12-02 RX ADMIN — PROPOFOL 50 MCG/KG/MIN: 10 INJECTION, EMULSION INTRAVENOUS at 10:12

## 2019-12-02 RX ADMIN — DIAZEPAM 5 MG: 5 TABLET ORAL at 12:12

## 2019-12-02 RX ADMIN — CEFAZOLIN 2 G: 1 INJECTION, POWDER, FOR SOLUTION INTRAMUSCULAR; INTRAVENOUS at 02:12

## 2019-12-02 RX ADMIN — PANTOPRAZOLE SODIUM 40 MG: 40 INJECTION, POWDER, FOR SOLUTION INTRAVENOUS at 08:12

## 2019-12-02 RX ADMIN — POLYETHYLENE GLYCOL 3350 17 G: 17 POWDER, FOR SOLUTION ORAL at 08:12

## 2019-12-02 RX ADMIN — LEVOTHYROXINE SODIUM 100 MCG: 50 TABLET ORAL at 05:12

## 2019-12-02 RX ADMIN — PROPOFOL 50 MCG/KG/MIN: 10 INJECTION, EMULSION INTRAVENOUS at 05:12

## 2019-12-02 RX ADMIN — METOPROLOL TARTRATE 25 MG: 25 TABLET ORAL at 08:12

## 2019-12-02 RX ADMIN — FENTANYL CITRATE 25 MCG: 50 INJECTION INTRAMUSCULAR; INTRAVENOUS at 12:12

## 2019-12-02 NOTE — PLAN OF CARE
POC reviewed with  wife; verbalized understanding.  Pt post op crani.  Pt became very agitated throughout shift and eventually had a seizure. Post seizure, pt intubated. Refer to shift notes for documentation.  NS @ 75.  Propofol @ 40.  FORREST drain in place.  Will continue to monitor.

## 2019-12-02 NOTE — CONSULTS
[facility]  Adult Nutrition  Consult Note    SUMMARY     Recommendations    Recommendation/Intervention:   1. Recommend increasing TF goal rate to best meet pt's needs.   Glucerna 1.5 @ 65mL/hr.   - Provides 2340kcals, 129g protein and 1184mL free water.     2. If able to extubate and advance diet, recommend Diabetic with texture per SLP recommendations.     RD to monitor.    Goals: Pt to receive >85% EEN and EPN by RD follow up  Nutrition Goal Status: new  Communication of RD Recs: reviewed with RN    Reason for Assessment    Reason For Assessment: consult  Diagnosis: other (see comments)(SDH)  Relevant Medical History: HTN, HLD, T2DM, thyroid disease, L toe amputation, prostate cancer  Interdisciplinary Rounds: attended  General Information Comments: Pt intubated, sedated. TF started, tolerating at trickle feeds- goal of 40mL/hr. No family at bedside to provide nutrition hx. No weight hx in  chart review. NFPE completed- pt nourished, obese with no indications of malnutrition at this time.  Nutrition Discharge Planning: unable to determine at this time    Nutrition Risk Screen    Nutrition Risk Screen: tube feeding or parenteral nutrition    Nutrition/Diet History    Spiritual, Cultural Beliefs, Yazidism Practices, Values that Affect Care: yes  Factors Affecting Nutritional Intake: NPO, on mechanical ventilation    Anthropometrics    Temp: 98.4 °F (36.9 °C)  Height: 6' (182.9 cm)  Height (inches): 72 in  Weight Method: Bed Scale  Weight: 102.1 kg (225 lb)  Weight (lb): 225 lb  Ideal Body Weight (IBW), Male: 178 lb  % Ideal Body Weight, Male (lb): 126.4 lb  BMI (Calculated): 30.5  BMI Grade: 30 - 34.9- obesity - grade I       Lab/Procedures/Meds    Pertinent Labs Reviewed: reviewed  Pertinent Labs Comments: HgbA1c 6.3, POCT Glu 127-131  Pertinent Medications Reviewed: reviewed  Pertinent Medications Comments: IVF, cardene, thiamine, propofol    Physical Findings/Assessment          Estimated/Assessed Needs    Weight Used For Calorie Calculations: 102.1 kg (225 lb 1.4 oz)  Energy Calorie Requirements (kcal): 2265  Energy Need Method: Tk State (modified)  Protein Requirements: 122-162g(1.5-2.0g/kg)  Weight Used For Protein Calculations: 80.9 kg (178 lb 5.6 oz)  Fluid Requirements (mL): 1mL/kcal or per MD     RDA Method (mL): 2265  CHO requirements: 268g CHO daily         Nutrition Prescription Ordered    Current Diet Order: NPO  Current Nutrition Support Formula Ordered: Glucerna 1.5  Current Nutrition Support Rate Ordered: 40 (ml)  Current Nutrition Support Frequency Ordered: mL/hr    Evaluation of Received Nutrient/Fluid Intake    Enteral Calories (kcal): 1440  Enteral Protein (gm): 79  Enteral (Free Water) Fluid (mL): 729  % Kcal Needs: 64  % Protein Needs: 65  IV Fluid (mL): 1800  I/O: -1.6L since admit, good UOP, LBM 11/28  Energy Calories Required: not meeting needs  Protein Required: not meeting needs  Fluid Required: other (see comments)(per MD)  Comments: 0mL residuals 12/2  Tolerance: tolerating  % Intake of Estimated Energy Needs: 50 - 75 %  % Meal Intake: NPO    Nutrition Risk    Level of Risk/Frequency of Follow-up: high(f/u 2x/week)     Assessment and Plan    Nutrition Problem  Inadequate energy intake    Related to (etiology):   TF provision    Signs and Symptoms (as evidenced by):   Pt receiving <85% EEN and EPN.     Interventions(treatment strategy):  Collaboration of care with providers    Nutrition Diagnosis Status:   New       Monitor and Evaluation    Food and Nutrient Intake: energy intake, food and beverage intake, enteral nutrition intake  Food and Nutrient Adminstration: diet order, enteral and parenteral nutrition administration  Anthropometric Measurements: weight, weight change, body mass index  Biochemical Data, Medical Tests and Procedures: electrolyte and renal panel, gastrointestinal profile, glucose/endocrine profile, inflammatory profile, lipid  profile  Nutrition-Focused Physical Findings: overall appearance     Malnutrition Assessment                 Orbital Region (Subcutaneous Fat Loss): well nourished  Upper Arm Region (Subcutaneous Fat Loss): well nourished  Thoracic and Lumbar Region: well nourished   Adventist Region (Muscle Loss): well nourished  Clavicle Bone Region (Muscle Loss): well nourished  Clavicle and Acromion Bone Region (Muscle Loss): well nourished  Scapular Bone Region (Muscle Loss): well nourished  Dorsal Hand (Muscle Loss): well nourished  Patellar Region (Muscle Loss): well nourished  Anterior Thigh Region (Muscle Loss): well nourished  Posterior Calf Region (Muscle Loss): well nourished                 Nutrition Follow-Up    RD Follow-up?: Yes

## 2019-12-02 NOTE — NURSING
1520- Allison, NP to bedside for increased agitation and work of breathing.  15 mg Valium given as ordered.  Dr. Rodrigez to bedside for intubation.      1555-  40 of etomidate and 100 of Sumanth given for intubation.  Maintenance drip of Propofol initiated.  Pt tolerating well.  Will continue to monitor.

## 2019-12-02 NOTE — ASSESSMENT & PLAN NOTE
Hypertension  -- Continue to monitor HR and BP   --SBP goal < 140  · -- 2D echo-Concentric left ventricular remodeling.  · Normal left ventricular systolic function. The estimated ejection fraction is 60%  · Normal right ventricular systolic function.  · Normal LV diastolic function.  No wall motion abnormalities  --Continue home med amlodipine-benazepril 5-10 daily

## 2019-12-02 NOTE — PROGRESS NOTES
Pt placed on portable monitor and oxygen and transported via bed with RN x2 to ED CT. Returned to room 9084 and placed on bedside monitor. Pt tolerated travel well. JAZMIN Cooper and neurosurgery notified post op CT complete. Will continue to monitor.

## 2019-12-02 NOTE — PT/OT/SLP PROGRESS
Speech Language Pathology  Discharge    Jermaine Linda  MRN: [id number]    Patient not seen today secondary to pt intubated at this time. Please re-consult when medically appropriate. No further ST warranted at this time.       Анна Suazo CCC-SLP  12/2/2019

## 2019-12-02 NOTE — PROCEDURES
[facility]     PRELIMINARY C-EEG REPORT:  Review: 12/1/19, 19:23 (start) - 20:09     Generalized slowing seen with asymmetry and decreased amplitude noted on the left.  No electrographic seizures seen during the period of review.      Full report to follow.  Will continue to monitor.     Thank you for involving us in the care of this patient.    Alberta Ortega MD, CANDI(), FACNS, FACELI.  Neurology-Epilepsy.  [facility].

## 2019-12-02 NOTE — OP NOTE
DATE OF PROCEDURE: 12/1/2019     PREOPERATIVE DIAGNOSES:   1. SDH (subdural hematoma) [S06.5X9A] - right holohemispheric  2. Brain edema, compression and midline shift    POSTOPERATIVE DIAGNOSES:   1. SDH (subdural hematoma) [S06.5X9A] - right holohemispheric  2. Brain edema, compression and midline shift    PROCEDURES PERFORMED:   Procedure(s) (LRB):  Right CRANIOTOMY, FOR SUBDURAL HEMATOMA EVACUATION  (Right)    Surgeon(s) and Role:     * Niles Toledo DO - Primary     * Jean Murray MD - Resident - Assisting    LEVEL OF INVOLVEMENT OF ATTENDING:  Full    INDICATION:  65yo m w pmh of HTN, HLD, DM2, thyroid disease, anxiety, and hx of prostate cancer presents after mechanical fall getting out of car and hitting head against concrete [facility] to have R SDH. pt transferred to [facility] and repeat CTH showed worsening SDH with icreased mass effect, and decline in mental status in ED.  Patient taken for emergent craniotomy for evacuation of acute SDH.    Consents were obtained from family and risks, benefits, and alternatives to surgery were discussed.    ANESTHESIA: General    ESTIMATED BLOOD LOSS: 100 ml    OPERATIVE PROCEDURE: The patient was brought into the Operating Room, identified and general anesthesia was induced using endotracheal intubation. All required IV lines, arterial line were placed and Nelson head espinoza was placed. Patient was kept in supine position with the head turned to the left. We shaved the hair to design a curvilinear incision on the  right side.  The area was prepped and draped using sterile technique. Local anesthetic was infiltrated.     We incised the skin, subcutaneous tissues and reflected the skin flap and the temporalis muscle. We placed 3 brando holes and fashioned a frontotemporalparietal craniotomy to gain access to the acute SDH. We placed tack-up stitches and the opened the dura and acute subdural hematoma came out under pressure. We used copious irrigation and  gentle suction to suck out the subdural clot until it was completely evacuated. We placed flowseal at the edges of the subdural to stop bleeding and wash off excessive flowseal. Hemostasis was acheived and maintained. We had excellent evacuation of the acute SDH and the brain came up nicely.  We closed dura in non-water tight fashion with neurolons and placed duramatric onlay.  We then replaced the bone flap using brando hole covers and craniofix plates and screws. We placed a 7 FORREST drain which was tunneled out through the brando hole and the skin.  We used 2-0 vicryl sutures to close galea and staples on the skin. We placed bacitracin ointment, telfa and metapore dressing to dress the incisions.    The patient was left intubated and transferred to the ICU in stable condition.      COMPLICATIONS: none    INCISION: right cranium    WOUND CLASS: clean    FINDINGS: large acute SDH with brain compression    DRAIN: FORREST    CONDITION: stable    PROGNOSIS: guarded

## 2019-12-02 NOTE — SUBJECTIVE & OBJECTIVE
Review of Systems   Unable to obtain a complete ROS due to level of consciousness- pt intubated  Objective:     Vitals:  Temp: 98.9 °F (37.2 °C)  Pulse: 91  Rhythm: normal sinus rhythm  BP: (!) 125/59  MAP (mmHg): 84  Resp: 16  SpO2: 97 %  Oxygen Concentration (%): 40  O2 Device (Oxygen Therapy): ventilator  Vent Mode: Spont  Set Rate: 16 bmp  Vt Set: 450 mL  Pressure Support: 12 cmH20  PEEP/CPAP: 8 cmH20  Peak Airway Pressure: 20 cmH2O  Mean Airway Pressure: 11 cmH20  Plateau Pressure: 0 cmH20    Temp  Min: 98.4 °F (36.9 °C)  Max: 100.8 °F (38.2 °C)  Pulse  Min: 91  Max: 139  BP  Min: 125/59  Max: 187/81  MAP (mmHg)  Min: 84  Max: 117  Resp  Min: 13  Max: 28  SpO2  Min: 95 %  Max: 99 %  Oxygen Concentration (%)  Min: 40  Max: 40    12/01 0701 - 12/02 0700  In: 4404 [I.V.:2704]  Out: 5880 [Urine:5300; Drains:580]   Unmeasured Output  Urine Occurrence: 1  Stool Occurrence: 0  Pad Count: 3       Physical Exam  GA: comfortable, no acute distress.   HEENT: No scleral icterus or JVD.   Pulmonary: Clear to auscultation A/L.   Cardiac: RRR S1 & S2 w/o rubs/murmurs/gallops.   Abdominal: Bowel sounds present x 4. No appreciable hepatosplenomegaly.  Skin: No jaundice, rashes, or visible lesions.  Neuro:  --GCS: E3 VT1 M6  --Mental Status:  Opens eyes to voice,  Tracks, doesn't follow commands  --Pupils 4mm, PERRL.   --Corneal reflex, gag, cough intact.  --LIPSCOMB spont  Medications:  Continuous  sodium chloride 0.9% Last Rate: 75 mL/hr at 12/02/19 1600   propofol Last Rate: 50 mcg/kg/min (12/02/19 1708)   Scheduled  amlodipine-benazepril 5-10 mg 1 capsule Daily   bisacodyl 10 mg Daily   ceFAZolin (ANCEF) IVPB 2 g Q8H   diazePAM 5 mg Q8H   [START ON 12/3/2019] heparin (porcine) 5,000 Units Q8H   levetiracetam IVPB 1,000 mg Q12H   levothyroxine 100 mcg Before breakfast   metoprolol tartrate 25 mg BID   mupirocin 1 g BID   [START ON 12/3/2019] pantoprazole 40 mg Daily   polyethylene glycol 17 g Daily   thiamine (VITAMIN B1) IVPB  100 mg Daily   PRN  acetaminophen 650 mg Q6H PRN   acetaminophen 650 mg Q6H PRN   clonazePAM 1 mg BID PRN   Dextrose 10% Bolus 12.5 g PRN   fentaNYL 25 mcg Q4H PRN   glucagon (human recombinant) 1 mg PRN   HYDROcodone-acetaminophen 1 tablet Q4H PRN   insulin aspart U-100 1-10 Units Q6H PRN   ondansetron 4 mg Q8H PRN   sodium chloride 0.9% 10 mL PRN     Today I personally reviewed pertinent medications, lines/drains/airways, imaging, laboratory results,     Diet  Diet NPO

## 2019-12-02 NOTE — PLAN OF CARE
POC reviewed with pt at 0430. Pt verbalized understanding. Questions and concerns addressed. NGT place,  meds given as per MAR. Pt progressing toward goals. Will continue to monitor. See flowsheets for full assessment and VS info ;

## 2019-12-02 NOTE — PLAN OF CARE
Nutrition assessment completed. Please see RD note for details.    Recommendation/Intervention:   1. Recommend increasing TF goal rate to best meet pt's needs.   Glucerna 1.5 @ 65mL/hr.   - Provides 2340kcals, 129g protein and 1184mL free water.     2. If able to extubate and advance diet, recommend Diabetic with texture per SLP recommendations.     RD to monitor.    Goals: Pt to receive >85% EEN and EPN by RD follow up  Nutrition Goal Status: new  Communication of RD Recs: reviewed with RN  Problem: Aspiration (Enteral Nutrition)  Goal: Absence of Aspiration Signs/Symptoms  Outcome: Ongoing, Progressing

## 2019-12-02 NOTE — HOSPITAL COURSE
12/2: 1 day s/p right craniotomy for SDH. Remains intubated. Febrile to 100.8 overnight, cultures sent.  12/3:  No acute event overnight.  He remains on EEG and propofol.  No clinical seizures.  Last follow-up CT satisfactory.  Exam stable  12/5:  No acute event overnight.  Exam is stable.  Had some agitation on Precedex.  12/6: NAEON. Stepped down from ICU. Vitals and labs stable. Episode of Afib with RVR on 12/4 resolved, EKG yesterday with NSR. HR controlled on current regimen. CPK continues to trend down. Resp Cx from 12/2 growing Serratia, pan-sensitive, likely colonized, no need to tx as pt asymptomatic. Family members at bedside. Neuro exam stable. AAOx4. No further agitation overnight, pt reports he slept well. Postop pain/headaches controlled. Denies weakness, paresthesias, speech difficulty, and gait instability. Tolerating PO diet, SLP upgraded to regular diet. PT/OT recs updated to . Medically stable for discharge home with family today.

## 2019-12-02 NOTE — PROGRESS NOTES
Alex NP notified pt not tolerating decrease in Propofol, now at 40 despite PRN fentanyl. Leg and head thrashing, trying to sit up. Ordered to give scheduled diazepam due at 2 now. Will continue to monitor.

## 2019-12-02 NOTE — ASSESSMENT & PLAN NOTE
64yom w pmh of HTN, HLD, DM2, thyroid disease, anxiety, and hx of prostate cancer presents after mechanical fall getting out of car and hitting head against concrete found at [facility] ED to have R SDH. pt transferred to [facility] and repeat CTH showed worsening SDH with icreased mass effect, and decline in mental status in ED. Now s/p right craniotomy for SDH evacuation (12/1):    - Continue ICU care  - Q1hr neurochecks  - HOB>30  - Wean to extubate per NCC  - Na>135  - SBP<140  - Ok for SQH  - Follow up infectious workup for fever overnight  - Drain to remain at this time, 430cc output

## 2019-12-02 NOTE — SUBJECTIVE & OBJECTIVE
Interval History: 1 day s/p right craniotomy for SDH. Remains intubated. Febrile to 100.8 overnight, cultures sent.    Review of patient's allergies indicates:   Allergen Reactions    Shellfish containing products        Past Medical History:   Diagnosis Date    Anxiety     Cancer     Diabetes mellitus     Hypertension      History reviewed. No pertinent surgical history.  Family History     None        Tobacco Use    Smoking status: Never Smoker    Smokeless tobacco: Never Used   Substance and Sexual Activity    Alcohol use: Not Currently    Drug use: Never    Sexual activity: Not Currently     Review of Systems   Unable to perform ROS: Mental status change     Objective:     Weight: 102.1 kg (225 lb)  Body mass index is 30.52 kg/m².  Vital Signs (Most Recent):  Temp: 99.2 °F (37.3 °C) (12/02/19 0700)  Pulse: 98 (12/02/19 0906)  Resp: 18 (12/02/19 0906)  BP: 125/63 (12/02/19 0906)  SpO2: 97 % (12/02/19 0906) Vital Signs (24h Range):  Temp:  [97.2 °F (36.2 °C)-100.8 °F (38.2 °C)] 99.2 °F (37.3 °C)  Pulse:  [] 98  Resp:  [17-38] 18  SpO2:  [92 %-100 %] 97 %  BP: (125-187)/(58-81) 125/63  Arterial Line BP: ()/() 128/61     Date 12/02/19 0700 - 12/03/19 0659   Shift 5052-3853 4518-1829 8640-0454 24 Hour Total   INTAKE   I.V.(mL/kg) 331.3(3.2)   331.3(3.2)   NG/   120   IV Piggyback 150   150   Shift Total(mL/kg) 601.3(5.9)   601.3(5.9)   OUTPUT   Drains 40   40   Shift Total(mL/kg) 40(0.4)   40(0.4)   Weight (kg) 102.1 102.1 102.1 102.1              Vent Mode: A/C  Oxygen Concentration (%):  [] 40  Resp Rate Total:  [17 br/min-32 br/min] 17 br/min  Vt Set:  [450 mL-500 mL] 450 mL  PEEP/CPAP:  [10 cmH20] 10 cmH20  Mean Airway Pressure:  [12 cmH20-15 cmH20] 12 cmH20    Male External Urinary Catheter 12/01/19 0449 Medium (Active)         Physical Exam:    Constitutional: He appears well-developed and well-nourished.     Cardiovascular: Normal rate.     Abdominal: Soft.      Neurological:   E2VtM5  PERRL   Localizes x4    Significant Labs:  Recent Labs   Lab 12/01/19  0404 12/01/19  1001 12/01/19  1130 12/02/19  0138   * 166*  166* 194* 126*    139  139 138 139   K 3.9 4.1  4.1 4.0 3.8    105  105 103 106   CO2 25 23  23 20* 26   BUN 17 16  16 17 11   CREATININE 0.9 0.8  0.8 0.8 0.8   CALCIUM 9.4 8.0*  8.0* 8.3* 7.6*   MG 1.7 1.5*  --  2.0     Recent Labs   Lab 12/01/19  0404 12/01/19  1001 12/02/19  0138   WBC 10.43 10.97 8.85   HGB 12.8* 11.0* 9.1*   HCT 39.8* 34.5* 27.6*    232 192     Recent Labs   Lab 12/01/19  0223 12/02/19 0138   LABPT 12.6  --    INR 1.0 1.0   APTT 25.9 24.1     Microbiology Results (last 7 days)     Procedure Component Value Units Date/Time    Culture, Respiratory with Gram Stain [[id number]] Collected:  12/02/19 0825    Order Status:  Completed Specimen:  Respiratory from Tracheal Aspirate Updated:  12/02/19 0944     Gram Stain (Respiratory) <10 epithelial cells per low power field.     Gram Stain (Respiratory) No WBC's     Gram Stain (Respiratory) Moderate Gram negative rods    Blood culture [[id number]] Collected:  12/02/19 0824    Order Status:  Sent Specimen:  Blood from Peripheral, Hand, Right Updated:  12/02/19 0913        All pertinent labs from the last 24 hours have been reviewed.    Significant Diagnostics:  Ct Head Without Contrast    Result Date: 12/1/2019  Evolving postoperative changes of right stevie-craniotomy for subdural hematoma evacuation, as detailed above. Stable trace right-sided subarachnoid hemorrhage. No acute intraparenchymal hemorrhage or major vascular distribution infarction. Electronically signed by resident: Ligia Toscano MD Date:    12/01/2019 Time:    16:58 Electronically signed by: Francisco Carranza MD Date:    12/01/2019 Time:    17:14    Ct Head Without Contrast    Result Date: 12/1/2019  Interval postoperative change status post right craniotomy for subdural hematoma evacuation.  Significant  associated improvement of localized mass effect and leftward midline shift with minimal shift remaining.  Also mild obliteration of the right ambient cistern which is new from the prior study, close follow-up advised. Trace subdural hematoma layering along the posterior interhemispheric falx. Scattered  right-sided subarachnoid hemorrhage, new from the prior exam.  No associated hydrocephalus at this time.  Continued follow-up advised. This report was flagged in Epic as abnormal. Electronically signed by resident: Ariel Murcia Date:    12/01/2019 Time:    10:50 Electronically signed by: Alondra Ireland MD Date:    12/01/2019 Time:    11:20

## 2019-12-02 NOTE — PLAN OF CARE
POC reviewed with pt and family at 1400. Pt family verbalized understanding. Questions and concerns addressed. No acute events today. NS and propofol gtt continued throughout shift. PRN fentanyl throughout shift. CEEG remains in place. Straight cath x3. Bath, turns and oral care per protocol. Pt progressing toward goals. Plan to wean sedation and extubate tomorrow. Will continue to monitor. See flowsheets for full assessment and VS info.

## 2019-12-02 NOTE — PROGRESS NOTES
[facility]  Neurosurgery  Progress Note    Subjective:     History of Present Illness: 64yom w pmh of HTN, HLD, DM2, thyroid disease, anxiety, and hx of prostate cancer presents after mechanical fall getting out of car and hitting head against concrete found at [facility] ED to have R SDH. pt transferred to [facility] and repeat CTH showed worsening SDH with icreased mass effect, and decline in mental status in ED.    Post-Op Info:  Procedure(s) (LRB):  Right CRANIOTOMY, FOR SUBDURAL HEMATOMA EVACUATION  (Right)   1 Day Post-Op     Interval History: 1 day s/p right craniotomy for SDH. Remains intubated. Febrile to 100.8 overnight, cultures sent.    Review of patient's allergies indicates:   Allergen Reactions    Shellfish containing products        Past Medical History:   Diagnosis Date    Anxiety     Cancer     Diabetes mellitus     Hypertension      History reviewed. No pertinent surgical history.  Family History     None        Tobacco Use    Smoking status: Never Smoker    Smokeless tobacco: Never Used   Substance and Sexual Activity    Alcohol use: Not Currently    Drug use: Never    Sexual activity: Not Currently     Review of Systems   Unable to perform ROS: Mental status change     Objective:     Weight: 102.1 kg (225 lb)  Body mass index is 30.52 kg/m².  Vital Signs (Most Recent):  Temp: 99.2 °F (37.3 °C) (12/02/19 0700)  Pulse: 98 (12/02/19 0906)  Resp: 18 (12/02/19 0906)  BP: 125/63 (12/02/19 0906)  SpO2: 97 % (12/02/19 0906) Vital Signs (24h Range):  Temp:  [97.2 °F (36.2 °C)-100.8 °F (38.2 °C)] 99.2 °F (37.3 °C)  Pulse:  [] 98  Resp:  [17-38] 18  SpO2:  [92 %-100 %] 97 %  BP: (125-187)/(58-81) 125/63  Arterial Line BP: ()/() 128/61     Date 12/02/19 0700 - 12/03/19 0659   Shift 0862-3429 8796-2793 4411-6910 24 Hour Total   INTAKE   I.V.(mL/kg) 331.3(3.2)   331.3(3.2)   NG/   120   IV Piggyback 150   150   Shift Total(mL/kg) 601.3(5.9)   601.3(5.9)   OUTPUT    Drains 40   40   Shift Total(mL/kg) 40(0.4)   40(0.4)   Weight (kg) 102.1 102.1 102.1 102.1              Vent Mode: A/C  Oxygen Concentration (%):  [] 40  Resp Rate Total:  [17 br/min-32 br/min] 17 br/min  Vt Set:  [450 mL-500 mL] 450 mL  PEEP/CPAP:  [10 cmH20] 10 cmH20  Mean Airway Pressure:  [12 cmH20-15 cmH20] 12 cmH20    Male External Urinary Catheter 12/01/19 0449 Medium (Active)         Physical Exam:    Constitutional: He appears well-developed and well-nourished.     Cardiovascular: Normal rate.     Abdominal: Soft.     Neurological:   E2VtM5  PERRL   Localizes x4    Significant Labs:  Recent Labs   Lab 12/01/19  0404 12/01/19  1001 12/01/19  1130 12/02/19  0138   * 166*  166* 194* 126*    139  139 138 139   K 3.9 4.1  4.1 4.0 3.8    105  105 103 106   CO2 25 23  23 20* 26   BUN 17 16  16 17 11   CREATININE 0.9 0.8  0.8 0.8 0.8   CALCIUM 9.4 8.0*  8.0* 8.3* 7.6*   MG 1.7 1.5*  --  2.0     Recent Labs   Lab 12/01/19  0404 12/01/19  1001 12/02/19  0138   WBC 10.43 10.97 8.85   HGB 12.8* 11.0* 9.1*   HCT 39.8* 34.5* 27.6*    232 192     Recent Labs   Lab 12/01/19  0223 12/02/19  0138   LABPT 12.6  --    INR 1.0 1.0   APTT 25.9 24.1     Microbiology Results (last 7 days)     Procedure Component Value Units Date/Time    Culture, Respiratory with Gram Stain [[id number]] Collected:  12/02/19 0825    Order Status:  Completed Specimen:  Respiratory from Tracheal Aspirate Updated:  12/02/19 0944     Gram Stain (Respiratory) <10 epithelial cells per low power field.     Gram Stain (Respiratory) No WBC's     Gram Stain (Respiratory) Moderate Gram negative rods    Blood culture [[id number]] Collected:  12/02/19 0824    Order Status:  Sent Specimen:  Blood from Peripheral, Hand, Right Updated:  12/02/19 0913        All pertinent labs from the last 24 hours have been reviewed.    Significant Diagnostics:  Ct Head Without Contrast    Result Date: 12/1/2019  Evolving postoperative  changes of right stevie-craniotomy for subdural hematoma evacuation, as detailed above. Stable trace right-sided subarachnoid hemorrhage. No acute intraparenchymal hemorrhage or major vascular distribution infarction. Electronically signed by resident: Ligia Toscano MD Date:    12/01/2019 Time:    16:58 Electronically signed by: Francisco Carranza MD Date:    12/01/2019 Time:    17:14    Ct Head Without Contrast    Result Date: 12/1/2019  Interval postoperative change status post right craniotomy for subdural hematoma evacuation.  Significant associated improvement of localized mass effect and leftward midline shift with minimal shift remaining.  Also mild obliteration of the right ambient cistern which is new from the prior study, close follow-up advised. Trace subdural hematoma layering along the posterior interhemispheric falx. Scattered  right-sided subarachnoid hemorrhage, new from the prior exam.  No associated hydrocephalus at this time.  Continued follow-up advised. This report was flagged in Epic as abnormal. Electronically signed by resident: Ariel Murcia Date:    12/01/2019 Time:    10:50 Electronically signed by: Alondra Ireland MD Date:    12/01/2019 Time:    11:20      Assessment/Plan:     * SDH (subdural hematoma)  64yom w pmh of HTN, HLD, DM2, thyroid disease, anxiety, and hx of prostate cancer presents after mechanical fall getting out of car and hitting head against concrete found at [facility] ED to have R SDH. pt transferred to [facility] [place] [facility] and repeat CTH showed worsening SDH with icreased mass effect, and decline in mental status in ED. Now s/p right craniotomy for SDH evacuation (12/1):    - Continue ICU care  - Q1hr neurochecks  - HOB>30  - Wean to extubate per NCC  - Na>135  - SBP<140  - Ok for SQH  - Follow up infectious workup for fever overnight  - Drain to remain at this time, 430cc output        Andrei Ribera MD  Neurosurgery  [facility]

## 2019-12-02 NOTE — PROGRESS NOTES
Pt placed on portable monitor and vent and transported to ED CT with RNx2 and RTx1. Returned to room 9084 and placed on bedside monitor and ventilator. Pt tolerated travel well. JAZMIN Cooper and Mrs. Lidna notified scan completed. Will continue to monitor.

## 2019-12-02 NOTE — PROGRESS NOTES
Alex notified pt still restless, agitated. Thrashing head, disconnected vent accidentally. Okay to go back to 50 on propofol. Will continue to monitor.

## 2019-12-02 NOTE — PLAN OF CARE
Attempted to meet with patient and or family in room x2 for discharge planning assessment. Pt unable to answer questions due to patient is intubated on vent.  No family present in room.  Phone call to patient's wife, Alyssia, [phone].  No answer.  Message left on V/m for wife to return call.        12/02/19 9337   Discharge Assessment   Assessment Type Discharge Planning Assessment   Confirmed/corrected address and phone number on facesheet? No   Assessment information obtained from? Medical Record   Expected Length of Stay (days) 7   Communicated expected length of stay with patient/caregiver no   Prior to hospitilization cognitive status: Unable to Assess   Current cognitive status: Coma/Sedated/Intubated   Current Functional Status: Completely Dependent   Lives With spouse   Is patient able to care for self after discharge? Unable to determine at this time (comments)   Who are your caregiver(s) and their phone number(s)? Alyssia Linda (wife) [phone]   Equipment Currently Used at Home none   Discharge Plan A Long-term acute care facility (LTAC)   Discharge Plan B Rehab;Skilled Nursing Facility   Patient/Family in Agreement with Plan unable to assess           PCP:  [facility]        Pharmacy:  No Pharmacies Listed      Emergency Contacts:  Extended Emergency Contact Information  Primary Emergency Contact: Mary Linda(Alyssia)   [place]  Mobile Phone: [phone]  Relation: Spouse      Insurance:  Payor: MEDICARE / Plan: MEDICARE PART A & B / Product Type: Government /     Leticia Ritchie RN, CCRN-K, [facility]  Neuro-Critical Care   X [phone]

## 2019-12-02 NOTE — PT/OT/SLP PROGRESS
Occupational Therapy      Patient Name:  Jermaine Linda   MRN:  [id number]    Patient not seen today secondary to pt intubated s/p craniotomy; will require new orders when appropriate for evaluation.    GENA Blankenship  12/2/2019

## 2019-12-02 NOTE — ASSESSMENT & PLAN NOTE
Right Traumatic SDH   --Continue Neuro checks q 1hr  -- Neurosurgery consulted  -- CT Head reveals acute subdural hemorrhage along the right cerebral convexity with worsening mass effect andleftward midline shift of approximately 12 mm and partial effacement of the basal cisterns.  -- SBP goal less than 140  -- Keppra 500 mg BID  -- Class A to the OR  -- Pending further recommendations per NGSY  -- 12/2: POD#1s/p right craniotomy for SDH evacuation (12/1), on EEG-per epilepsy team [id number] to wean prop gtt    -- continue valium 5 mg TID-for agitation

## 2019-12-03 LAB
ALBUMIN SERPL BCP-MCNC: 2.8 G/DL (ref 3.5–5.2)
ALLENS TEST: ABNORMAL
ALP SERPL-CCNC: 40 U/L (ref 55–135)
ALT SERPL W/O P-5'-P-CCNC: 27 U/L (ref 10–44)
ANION GAP SERPL CALC-SCNC: 6 MMOL/L (ref 8–16)
AST SERPL-CCNC: 34 U/L (ref 10–40)
BASOPHILS # BLD AUTO: 0.02 K/UL (ref 0–0.2)
BASOPHILS NFR BLD: 0.2 % (ref 0–1.9)
BILIRUB SERPL-MCNC: 0.4 MG/DL (ref 0.1–1)
BUN SERPL-MCNC: 7 MG/DL (ref 8–23)
CALCIUM SERPL-MCNC: 7.8 MG/DL (ref 8.7–10.5)
CHLORIDE SERPL-SCNC: 108 MMOL/L (ref 95–110)
CK SERPL-CCNC: 918 U/L (ref 20–200)
CO2 SERPL-SCNC: 26 MMOL/L (ref 23–29)
CREAT SERPL-MCNC: 0.7 MG/DL (ref 0.5–1.4)
DELSYS: ABNORMAL
DIFFERENTIAL METHOD: ABNORMAL
EOSINOPHIL # BLD AUTO: 0.1 K/UL (ref 0–0.5)
EOSINOPHIL NFR BLD: 0.7 % (ref 0–8)
ERYTHROCYTE [DISTWIDTH] IN BLOOD BY AUTOMATED COUNT: 14.6 % (ref 11.5–14.5)
EST. GFR  (AFRICAN AMERICAN): >60 ML/MIN/1.73 M^2
EST. GFR  (NON AFRICAN AMERICAN): >60 ML/MIN/1.73 M^2
FIO2: 40
GLUCOSE SERPL-MCNC: 125 MG/DL (ref 70–110)
HCO3 UR-SCNC: 27.2 MMOL/L (ref 24–28)
HCT VFR BLD AUTO: 25.7 % (ref 40–54)
HGB BLD-MCNC: 8.3 G/DL (ref 14–18)
IMM GRANULOCYTES # BLD AUTO: 0.03 K/UL (ref 0–0.04)
IMM GRANULOCYTES NFR BLD AUTO: 0.4 % (ref 0–0.5)
LYMPHOCYTES # BLD AUTO: 0.9 K/UL (ref 1–4.8)
LYMPHOCYTES NFR BLD: 10.5 % (ref 18–48)
MAGNESIUM SERPL-MCNC: 2.1 MG/DL (ref 1.6–2.6)
MAP: 11
MCH RBC QN AUTO: 31.8 PG (ref 27–31)
MCHC RBC AUTO-ENTMCNC: 32.3 G/DL (ref 32–36)
MCV RBC AUTO: 99 FL (ref 82–98)
MIN VOL: 7.73
MODE: ABNORMAL
MONOCYTES # BLD AUTO: 0.6 K/UL (ref 0.3–1)
MONOCYTES NFR BLD: 7 % (ref 4–15)
NEUTROPHILS # BLD AUTO: 6.9 K/UL (ref 1.8–7.7)
NEUTROPHILS NFR BLD: 81.2 % (ref 38–73)
NRBC BLD-RTO: 0 /100 WBC
PCO2 BLDA: 46.5 MMHG (ref 35–45)
PEEP: 8
PH SMN: 7.37 [PH] (ref 7.35–7.45)
PHOSPHATE SERPL-MCNC: 2.1 MG/DL (ref 2.7–4.5)
PLATELET # BLD AUTO: 170 K/UL (ref 150–350)
PMV BLD AUTO: 10.5 FL (ref 9.2–12.9)
PO2 BLDA: 132 MMHG (ref 80–100)
POC BE: 2 MMOL/L
POC SATURATED O2: 99 % (ref 95–100)
POC TCO2: 29 MMOL/L (ref 23–27)
POCT GLUCOSE: 116 MG/DL (ref 70–110)
POCT GLUCOSE: 122 MG/DL (ref 70–110)
POCT GLUCOSE: 130 MG/DL (ref 70–110)
POTASSIUM SERPL-SCNC: 3.7 MMOL/L (ref 3.5–5.1)
POTASSIUM SERPL-SCNC: 4 MMOL/L (ref 3.5–5.1)
PROT SERPL-MCNC: 5.4 G/DL (ref 6–8.4)
PS: 12
RBC # BLD AUTO: 2.61 M/UL (ref 4.6–6.2)
SAMPLE: ABNORMAL
SITE: ABNORMAL
SODIUM SERPL-SCNC: 140 MMOL/L (ref 136–145)
SP02: 98
SPONT RATE: 15
VOL: 508
WBC # BLD AUTO: 8.48 K/UL (ref 3.9–12.7)

## 2019-12-03 PROCEDURE — 20000000 HC ICU ROOM

## 2019-12-03 PROCEDURE — 25000003 PHARM REV CODE 250: Performed by: PSYCHIATRY & NEUROLOGY

## 2019-12-03 PROCEDURE — 84132 ASSAY OF SERUM POTASSIUM: CPT

## 2019-12-03 PROCEDURE — 25000003 PHARM REV CODE 250: Performed by: NURSE PRACTITIONER

## 2019-12-03 PROCEDURE — 25000242 PHARM REV CODE 250 ALT 637 W/ HCPCS: Performed by: STUDENT IN AN ORGANIZED HEALTH CARE EDUCATION/TRAINING PROGRAM

## 2019-12-03 PROCEDURE — 80053 COMPREHEN METABOLIC PANEL: CPT

## 2019-12-03 PROCEDURE — 63600175 PHARM REV CODE 636 W HCPCS: Performed by: PHYSICIAN ASSISTANT

## 2019-12-03 PROCEDURE — 99291 CRITICAL CARE FIRST HOUR: CPT | Mod: GC,,, | Performed by: PSYCHIATRY & NEUROLOGY

## 2019-12-03 PROCEDURE — 37799 UNLISTED PX VASCULAR SURGERY: CPT

## 2019-12-03 PROCEDURE — 99291 PR CRITICAL CARE, E/M 30-74 MINUTES: ICD-10-PCS | Mod: GC,,, | Performed by: PSYCHIATRY & NEUROLOGY

## 2019-12-03 PROCEDURE — 27200966 HC CLOSED SUCTION SYSTEM

## 2019-12-03 PROCEDURE — 82803 BLOOD GASES ANY COMBINATION: CPT

## 2019-12-03 PROCEDURE — 94761 N-INVAS EAR/PLS OXIMETRY MLT: CPT

## 2019-12-03 PROCEDURE — 83735 ASSAY OF MAGNESIUM: CPT

## 2019-12-03 PROCEDURE — 92523 SPEECH SOUND LANG COMPREHEN: CPT

## 2019-12-03 PROCEDURE — 82800 BLOOD PH: CPT

## 2019-12-03 PROCEDURE — 82550 ASSAY OF CK (CPK): CPT

## 2019-12-03 PROCEDURE — 63600175 PHARM REV CODE 636 W HCPCS: Performed by: NURSE PRACTITIONER

## 2019-12-03 PROCEDURE — 63600175 PHARM REV CODE 636 W HCPCS: Performed by: STUDENT IN AN ORGANIZED HEALTH CARE EDUCATION/TRAINING PROGRAM

## 2019-12-03 PROCEDURE — 94640 AIRWAY INHALATION TREATMENT: CPT

## 2019-12-03 PROCEDURE — 27000221 HC OXYGEN, UP TO 24 HOURS

## 2019-12-03 PROCEDURE — 51701 INSERT BLADDER CATHETER: CPT

## 2019-12-03 PROCEDURE — 84100 ASSAY OF PHOSPHORUS: CPT

## 2019-12-03 PROCEDURE — 92610 EVALUATE SWALLOWING FUNCTION: CPT

## 2019-12-03 PROCEDURE — 51798 US URINE CAPACITY MEASURE: CPT

## 2019-12-03 PROCEDURE — 99900035 HC TECH TIME PER 15 MIN (STAT)

## 2019-12-03 PROCEDURE — 99900026 HC AIRWAY MAINTENANCE (STAT)

## 2019-12-03 PROCEDURE — 85025 COMPLETE CBC W/AUTO DIFF WBC: CPT

## 2019-12-03 PROCEDURE — 94003 VENT MGMT INPAT SUBQ DAY: CPT

## 2019-12-03 RX ORDER — SODIUM,POTASSIUM PHOSPHATES 280-250MG
2 POWDER IN PACKET (EA) ORAL
Status: DISCONTINUED | OUTPATIENT
Start: 2019-12-03 | End: 2019-12-05

## 2019-12-03 RX ORDER — DIAZEPAM 10 MG/2ML
2.5 INJECTION INTRAMUSCULAR ONCE
Status: COMPLETED | OUTPATIENT
Start: 2019-12-04 | End: 2019-12-04

## 2019-12-03 RX ORDER — POTASSIUM CHLORIDE 20 MEQ/15ML
40 SOLUTION ORAL
Status: DISCONTINUED | OUTPATIENT
Start: 2019-12-03 | End: 2019-12-05

## 2019-12-03 RX ORDER — HYDRALAZINE HYDROCHLORIDE 20 MG/ML
10 INJECTION INTRAMUSCULAR; INTRAVENOUS EVERY 4 HOURS PRN
Status: DISCONTINUED | OUTPATIENT
Start: 2019-12-03 | End: 2019-12-06 | Stop reason: HOSPADM

## 2019-12-03 RX ORDER — LABETALOL HCL 20 MG/4 ML
10 SYRINGE (ML) INTRAVENOUS EVERY 4 HOURS PRN
Status: DISCONTINUED | OUTPATIENT
Start: 2019-12-03 | End: 2019-12-06 | Stop reason: HOSPADM

## 2019-12-03 RX ORDER — LANOLIN ALCOHOL/MO/W.PET/CERES
800 CREAM (GRAM) TOPICAL
Status: DISCONTINUED | OUTPATIENT
Start: 2019-12-03 | End: 2019-12-05

## 2019-12-03 RX ORDER — POTASSIUM CHLORIDE 20 MEQ/15ML
60 SOLUTION ORAL
Status: DISCONTINUED | OUTPATIENT
Start: 2019-12-03 | End: 2019-12-05

## 2019-12-03 RX ORDER — IPRATROPIUM BROMIDE AND ALBUTEROL SULFATE 2.5; .5 MG/3ML; MG/3ML
3 SOLUTION RESPIRATORY (INHALATION)
Status: DISCONTINUED | OUTPATIENT
Start: 2019-12-03 | End: 2019-12-04

## 2019-12-03 RX ORDER — DEXMEDETOMIDINE HYDROCHLORIDE 4 UG/ML
0.2 INJECTION, SOLUTION INTRAVENOUS CONTINUOUS
Status: DISCONTINUED | OUTPATIENT
Start: 2019-12-03 | End: 2019-12-03

## 2019-12-03 RX ADMIN — THIAMINE HYDROCHLORIDE 100 MG: 100 INJECTION, SOLUTION INTRAMUSCULAR; INTRAVENOUS at 10:12

## 2019-12-03 RX ADMIN — HYDRALAZINE HYDROCHLORIDE 10 MG: 20 INJECTION INTRAMUSCULAR; INTRAVENOUS at 10:12

## 2019-12-03 RX ADMIN — LEVETIRACETAM 1000 MG: 10 INJECTION INTRAVENOUS at 09:12

## 2019-12-03 RX ADMIN — IPRATROPIUM BROMIDE AND ALBUTEROL SULFATE 3 ML: .5; 3 SOLUTION RESPIRATORY (INHALATION) at 03:12

## 2019-12-03 RX ADMIN — BISACODYL 10 MG RECTAL SUPPOSITORY 10 MG: at 08:12

## 2019-12-03 RX ADMIN — HEPARIN SODIUM 5000 UNITS: 5000 INJECTION, SOLUTION INTRAVENOUS; SUBCUTANEOUS at 09:12

## 2019-12-03 RX ADMIN — DEXMEDETOMIDINE HYDROCHLORIDE 0.2 MCG/KG/HR: 4 INJECTION, SOLUTION INTRAVENOUS at 02:12

## 2019-12-03 RX ADMIN — POTASSIUM & SODIUM PHOSPHATES POWDER PACK 280-160-250 MG 2 PACKET: 280-160-250 PACK at 06:12

## 2019-12-03 RX ADMIN — PANTOPRAZOLE SODIUM 40 MG: 40 GRANULE, DELAYED RELEASE ORAL at 08:12

## 2019-12-03 RX ADMIN — POTASSIUM CHLORIDE 40 MEQ: 20 SOLUTION ORAL at 01:12

## 2019-12-03 RX ADMIN — METOPROLOL TARTRATE 25 MG: 25 TABLET ORAL at 09:12

## 2019-12-03 RX ADMIN — HYDROCODONE BITARTRATE AND ACETAMINOPHEN 1 TABLET: 5; 325 TABLET ORAL at 01:12

## 2019-12-03 RX ADMIN — DIAZEPAM 5 MG: 5 TABLET ORAL at 04:12

## 2019-12-03 RX ADMIN — LEVETIRACETAM 1000 MG: 10 INJECTION INTRAVENOUS at 08:12

## 2019-12-03 RX ADMIN — MUPIROCIN 1 G: 20 OINTMENT TOPICAL at 08:12

## 2019-12-03 RX ADMIN — DIAZEPAM 5 MG: 5 TABLET ORAL at 09:12

## 2019-12-03 RX ADMIN — HYDROCODONE BITARTRATE AND ACETAMINOPHEN 1 TABLET: 5; 325 TABLET ORAL at 08:12

## 2019-12-03 RX ADMIN — CHLORHEXIDINE GLUCONATE 0.12% ORAL RINSE 15 ML: 1.2 LIQUID ORAL at 08:12

## 2019-12-03 RX ADMIN — FENTANYL CITRATE 25 MCG: 50 INJECTION INTRAMUSCULAR; INTRAVENOUS at 01:12

## 2019-12-03 RX ADMIN — HEPARIN SODIUM 5000 UNITS: 5000 INJECTION, SOLUTION INTRAVENOUS; SUBCUTANEOUS at 01:12

## 2019-12-03 RX ADMIN — POTASSIUM & SODIUM PHOSPHATES POWDER PACK 280-160-250 MG 2 PACKET: 280-160-250 PACK at 01:12

## 2019-12-03 RX ADMIN — METOPROLOL TARTRATE 25 MG: 25 TABLET ORAL at 08:12

## 2019-12-03 RX ADMIN — HEPARIN SODIUM 5000 UNITS: 5000 INJECTION, SOLUTION INTRAVENOUS; SUBCUTANEOUS at 05:12

## 2019-12-03 RX ADMIN — HYDROCODONE BITARTRATE AND ACETAMINOPHEN 1 TABLET: 5; 325 TABLET ORAL at 06:12

## 2019-12-03 RX ADMIN — PROPOFOL 50 MCG/KG/MIN: 10 INJECTION, EMULSION INTRAVENOUS at 04:12

## 2019-12-03 RX ADMIN — IPRATROPIUM BROMIDE AND ALBUTEROL SULFATE 3 ML: .5; 3 SOLUTION RESPIRATORY (INHALATION) at 12:12

## 2019-12-03 RX ADMIN — CEFAZOLIN 2 G: 1 INJECTION, POWDER, FOR SOLUTION INTRAMUSCULAR; INTRAVENOUS at 05:12

## 2019-12-03 RX ADMIN — POLYETHYLENE GLYCOL 3350 17 G: 17 POWDER, FOR SOLUTION ORAL at 08:12

## 2019-12-03 RX ADMIN — HYDROCODONE BITARTRATE AND ACETAMINOPHEN 1 TABLET: 5; 325 TABLET ORAL at 10:12

## 2019-12-03 RX ADMIN — PROPOFOL 50 MCG/KG/MIN: 10 INJECTION, EMULSION INTRAVENOUS at 02:12

## 2019-12-03 RX ADMIN — MUPIROCIN 1 G: 20 OINTMENT TOPICAL at 09:12

## 2019-12-03 RX ADMIN — CLONAZEPAM 1 MG: 0.5 TABLET ORAL at 07:12

## 2019-12-03 RX ADMIN — IPRATROPIUM BROMIDE AND ALBUTEROL SULFATE 3 ML: .5; 3 SOLUTION RESPIRATORY (INHALATION) at 08:12

## 2019-12-03 RX ADMIN — FENTANYL CITRATE 25 MCG: 50 INJECTION INTRAMUSCULAR; INTRAVENOUS at 06:12

## 2019-12-03 RX ADMIN — AMLODIPINE AND BENAZEPRIL HYDROCHLORIDE 1 CAPSULE: 5; 10 CAPSULE ORAL at 08:12

## 2019-12-03 RX ADMIN — PROPOFOL 50 MCG/KG/MIN: 10 INJECTION, EMULSION INTRAVENOUS at 08:12

## 2019-12-03 RX ADMIN — DIAZEPAM 5 MG: 5 TABLET ORAL at 05:12

## 2019-12-03 RX ADMIN — LEVOTHYROXINE SODIUM 100 MCG: 50 TABLET ORAL at 05:12

## 2019-12-03 NOTE — PT/OT/SLP EVAL
Speech Language Pathology Evaluation  Cognitive/Bedside Swallow    Patient Name:  Jermaine Linda   MRN:  [id number]  Admitting Diagnosis: SDH (subdural hematoma)    Recommendations:                  General Recommendations:  Dysphagia therapy, Speech/language therapy and Cognitive-linguistic therapy  Diet recommendations:  Puree, Thin   Aspiration Precautions: 1 bite/sip at a time, Eliminate distractions, Feed only when awake/alert, Meds crushed in puree and Standard aspiration precautions   General Precautions: Standard,    Communication strategies:  none    History:     Past Medical History:   Diagnosis Date    Anxiety     Cancer     Diabetes mellitus     Hypertension        Past Surgical History:   Procedure Laterality Date    CRANIOTOMY FOR EVACUATION OF SUBDURAL HEMATOMA Right 12/1/2019    Procedure: Right CRANIOTOMY, FOR SUBDURAL HEMATOMA EVACUATION ;  Surgeon: Niles Toledo DO;  Location: [facility] OR [address] [place] [address];  Service: Neurosurgery;  Laterality: Right;       Social History: Patient lives with spouse     Prior Intubation HX: intubated on arrival 12/1- Extubated noon earlier this date 12/3    Modified Barium Swallow: non prior     Prior diet:  Currently NPO with NG tube in place ; baseline regular solids and thin liquids     Occupation/hobbies/homemaking: pt independent with household chores    Subjective     Pt awake and pleasant; confusion evident   Spouse at the bedside     Pain/Comfort:  · Pain Rating 1: 0/10  · Pain Rating Post-Intervention 1: 0/10    Objective:     Cognitive Status:    Arousal/Alertness Appropriate response to stimuli  Orientation Person only   Pt aware of type of building   General confusion      Receptive Language:   Comprehension:   Questions Simple yes/no 70% w/ min cues    Single step directions 50% of the time with model     Pragmatics:    Flat and Abnormal affect     Expressive Language:  Verbal:    clear intelligible speech, word finding deficits and delayed responses evident        Motor Speech:  WFL    Voice:   strong and clear     Visual-Spatial:  ongoing assessment warranted    Reading:   ongoing assessment warranted      Written Expression:   ongoing assessment warranted     Oral Musculature Evaluation  · Oral Musculature: unable to assess due to poor participation/comprehension  · Dentition: present and adequate  · Volitional Cough: strong   · Volitional Swallow: difficulty eliciting 2/2 cognitive impairments   · Voice Prior to PO Intake:  strong and clear     Bedside Swallow Eval:   Consistencies Assessed:  · Thin liquids 5oz via small single sips of thin liqiuds  · Puree 2oz via full lei  · Solids deferred 2/2 confusion      Oral Phase:   · mild oral loss with thin liqiuds otherwise functional containment and AP transfer   · WFL    Pharyngeal Phase:   · no overt clinical signs/symptoms of aspiration  · no overt clinical signs/symptoms of pharyngeal dysphagia    Compensatory Strategies  · None    Treatment:  Education provided to Pt re: SLP role in acute care setting, overall impressions and therapeutic goals. Whiteboard updated.      Assessment:     Jermaine Linda is a 64 y.o. male with an SLP diagnosis of Aphasia, Dysphagia and Cognitive-Linguistic Impairment.      Goals:   Multidisciplinary Problems     SLP Goals        Problem: SLP Goal    Goal Priority Disciplines Outcome   SLP Goal     SLP Ongoing, Progressing   Description:  Speech Language Pathology Goals  Goals expected to be met by 12/10    1. Pt will tolerate diet of thin liquids and purees without overt clinical signs of aspiration   2. Pt will participate in trials of soft solids within speech therapy sessions to help determine least restrictive diet   3. Pt will demonstrate orientation to self, place, situation , family members, date w/ min cues   4. Pt will complete problem solving skills w/ 75 % acc to enhance safety awareness   5. Pt will generate 5 items per category to enhance organizations skills   6. Pt will  follow single step directions w/ 80% acc w/ min cues to enhance comprehension skills   7.  Pt will participate in ongoing assessment of speech language and cognitive linguistic skills to help rule out deficits and determine therapeutic plan of care                         Plan:     · Patient to be seen:  4 x/week   · Plan of Care expires:     · Plan of Care reviewed with:  patient, spouse   · SLP Follow-Up:  Yes       Discharge recommendations:  Discharge Facility/Level of Care Needs: rehabilitation facility   Barriers to Discharge:  Level of Skilled Assistance Needed      Time Tracking:     SLP Treatment Date:   12/03/19  Speech Start Time:  1440  Speech Stop Time:  1500     Speech Total Time (min):  20 min    Billable Minutes: Eval 10  and Eval Swallow and Oral Function 10    Keshia Keys CCC-SLP  12/03/2019

## 2019-12-03 NOTE — PROGRESS NOTES
Hilario, NP notified that pt is increasingly restless and agitated with SBP sustaining >160. Next dose of prn fentanyl isn't due until 0100. NP ordered 1x dose of 25 mcg of fentanyl now. Will continue to monitor.

## 2019-12-03 NOTE — PROGRESS NOTES
[facility]  Neurocritical Care  Progress Note    Admit Date: 12/1/2019  Service Date: 12/02/2019  Length of Stay: 1    Subjective:     Chief Complaint: SDH (subdural hematoma)    History of Present Illness: Mr. Linda is a 64 year old male with a PMHx of HTN, HLD, DM2, thyroid disease, anxiety, toe amputation left, and hx of prostate cancer presents as a transfer from [facility] to Neuro Critical Care s/p SDH after mechanical fall +blood alcohol level 216 and getting out of car and hitting head against concrete.  CT Head reveals acute subdural hemorrhage along the right cerebral convexity with worsening mass effect and new leftward midline shift of approximately 12 mm and partial effacement of the basal cisterns. On examination, patient is awake, alert, oriented X 2, follows simple commands, left sided weakness, and left facial droop. No reports of blood thinners.Patient will be Class A to the OR with NGSY. Patient will be admitted to Neuro Critical Care for a higher level of care.     Hospital Course: 12/1 admit to [facility] s/p SDH after mechanical fall +blood alcohol level 216 and getting out of car and hitting head against concrete  12/2: attempted to wean propofol-pt very agitated, continue continue scheduled diazepam 5mg TID, SBP <160, add metoprolol BID,CXR, bisacodyl,SBT and nutrition consult.         Review of Systems   Unable to obtain a complete ROS due to level of consciousness- pt intubated  Objective:     Vitals:  Temp: 98.9 °F (37.2 °C)  Pulse: 91  Rhythm: normal sinus rhythm  BP: (!) 125/59  MAP (mmHg): 84  Resp: 16  SpO2: 97 %  Oxygen Concentration (%): 40  O2 Device (Oxygen Therapy): ventilator  Vent Mode: Spont  Set Rate: 16 bmp  Vt Set: 450 mL  Pressure Support: 12 cmH20  PEEP/CPAP: 8 cmH20  Peak Airway Pressure: 20 cmH2O  Mean Airway Pressure: 11 cmH20  Plateau Pressure: 0 cmH20    Temp  Min: 98.4 °F (36.9 °C)  Max: 100.8 °F (38.2 °C)  Pulse  Min: 91  Max: 139  BP  Min:  125/59  Max: 187/81  MAP (mmHg)  Min: 84  Max: 117  Resp  Min: 13  Max: 28  SpO2  Min: 95 %  Max: 99 %  Oxygen Concentration (%)  Min: 40  Max: 40    12/01 0701 - 12/02 0700  In: 4404 [I.V.:2704]  Out: 5880 [Urine:5300; Drains:580]   Unmeasured Output  Urine Occurrence: 1  Stool Occurrence: 0  Pad Count: 3       Physical Exam  GA: comfortable, no acute distress.   HEENT: No scleral icterus or JVD.   Pulmonary: Clear to auscultation A/L.   Cardiac: RRR S1 & S2 w/o rubs/murmurs/gallops.   Abdominal: Bowel sounds present x 4. No appreciable hepatosplenomegaly.  Skin: No jaundice, rashes, or visible lesions.  Neuro:  --GCS: E3 VT1 M6  --Mental Status:  Opens eyes to voice,  Tracks, doesn't follow commands  --Pupils 4mm, PERRL.   --Corneal reflex, gag, cough intact.  --LIPSCOMB spont  Medications:  Continuous  sodium chloride 0.9% Last Rate: 75 mL/hr at 12/02/19 1600   propofol Last Rate: 50 mcg/kg/min (12/02/19 1708)   Scheduled  amlodipine-benazepril 5-10 mg 1 capsule Daily   bisacodyl 10 mg Daily   ceFAZolin (ANCEF) IVPB 2 g Q8H   diazePAM 5 mg Q8H   [START ON 12/3/2019] heparin (porcine) 5,000 Units Q8H   levetiracetam IVPB 1,000 mg Q12H   levothyroxine 100 mcg Before breakfast   metoprolol tartrate 25 mg BID   mupirocin 1 g BID   [START ON 12/3/2019] pantoprazole 40 mg Daily   polyethylene glycol 17 g Daily   thiamine (VITAMIN B1) IVPB 100 mg Daily   PRN  acetaminophen 650 mg Q6H PRN   acetaminophen 650 mg Q6H PRN   clonazePAM 1 mg BID PRN   Dextrose 10% Bolus 12.5 g PRN   fentaNYL 25 mcg Q4H PRN   glucagon (human recombinant) 1 mg PRN   HYDROcodone-acetaminophen 1 tablet Q4H PRN   insulin aspart U-100 1-10 Units Q6H PRN   ondansetron 4 mg Q8H PRN   sodium chloride 0.9% 10 mL PRN     Today I personally reviewed pertinent medications, lines/drains/airways, imaging, laboratory results,     Diet  Diet NPO      Assessment/Plan:     Neuro  * SDH (subdural hematoma)  Right Traumatic SDH   --Continue Neuro checks q 1hr  --  Neurosurgery consulted  -- CT Head reveals acute subdural hemorrhage along the right cerebral convexity with worsening mass effect andleftward midline shift of approximately 12 mm and partial effacement of the basal cisterns.  -- SBP goal less than 140  -- Keppra 500 mg BID  -- Class A to the OR  -- Pending further recommendations per NGSY  -- 12/2: POD#1s/p right craniotomy for SDH evacuation (12/1), on EEG-per epilepsy team [id number] to wean prop gtt    -- continue valium 5 mg TID-for agitation        Seizure prophylaxis  -12/2: on EEG-eeg suppressed per epilepsy team   -keppra 1gm BID    Cardiac/Vascular  Hyperlipidemia  --lipid panel pending  -- Atorvastatin 80 mg daily     Essential hypertension  Hypertension  -- Continue to monitor HR and BP   --SBP goal < 140  · -- 2D echo-Concentric left ventricular remodeling.  · Normal left ventricular systolic function. The estimated ejection fraction is 60%  · Normal right ventricular systolic function.  · Normal LV diastolic function.  No wall motion abnormalities  --Continue home med amlodipine-benazepril 5-10 daily      Renal/  Hypovolemia  -continue IVF, NS@75    Endocrine  Hypothyroidism  -TSH pending  - Continue levothyroxine 100 mcg daily     Diabetes mellitus  -- DdhrF7d 6.3  -- SSI     GI  Transaminase or LDH elevation  -trending down     Orthopedic  Non-traumatic rhabdomyolysis  --CK daily  --continue IVF  --trending down           The patient is being Prophylaxed for:  Venous Thromboembolism with: Chemical  Stress Ulcer with: PPI  Ventilator Pneumonia with: chlorhexidine oral care    Activity Orders          Diet NPO: NPO starting at 12/01 0436        Full Code    Lexi Johnson NP  Neurocritical Care  [facility]    Crit care time > 30 min

## 2019-12-03 NOTE — PLAN OF CARE
Pt seen for clinical swallow assessment appearing safe for thin liquids and purees with meds crushed in puree. Cognitive and communication delays evident.     Keshia Keys MS, CCC-SLP  Speech Language Pathologist  Pager: [phone]  Date 12/3/2019

## 2019-12-03 NOTE — ASSESSMENT & PLAN NOTE
64yom w pmh of HTN, HLD, DM2, thyroid disease, anxiety, and hx of prostate cancer presents after mechanical fall getting out of car and hitting head [facility] to have R SDH. pt transferred to [facility] and repeat CTH showed worsening SDH with icreased mass effect, and decline in mental status in ED. Now s/p right craniotomy for SDH evacuation (12/1):    - Continue ICU care  - Q1hr neurochecks  - Will pull drain today  - Last CT is satisfactory, planning for follow-up CT after drain removal  - Follow-up final EEG.  Continue AED per ICU  - HOB>30  - Wean to extubate per NCC  - Na>135  - SBP<140  - Ok for SQH  - Daily PTOT  - Further care per ICU team  - Will continue to follow closely

## 2019-12-03 NOTE — ASSESSMENT & PLAN NOTE
Right Traumatic SDH   --Continue Neuro checks q 1hr  -- 12/2: POD#2s/p right craniotomy for SDH evacuation (12/1)  -- continue cEEG, f/up per epilepsy  -- Subgaleal drain removed this AM per NSGY, abx d/c'd  - SBP <140   -- continue valium 5 mg TID-for agitation

## 2019-12-03 NOTE — PROGRESS NOTES
Alex NP notified pt restless, agitated and pressure sustaining >160. Next fentanyl PRN not due for 1.5 hours. Okay to give extra 25 mcg dose now. Will continue to monitor.

## 2019-12-03 NOTE — CARE UPDATE
Pt extubated per MD order. Pt on 3 lpm NC tolerating well. No respiratory distress noted. Aerosol tx given. Will continue to monitor.

## 2019-12-03 NOTE — SUBJECTIVE & OBJECTIVE
Interval History: No acute event overnight.  He remains on EEG and propofol.  No clinical seizures.  Last follow-up CT satisfactory.  Exam stable    Medications:  Continuous Infusions:   sodium chloride 0.9% 75 mL/hr at 12/03/19 1102     Scheduled Meds:   albuterol-ipratropium  3 mL Nebulization Q4H WAKE    amlodipine-benazepril 5-10 mg  1 capsule Per NG tube Daily    bisacodyl  10 mg Rectal Daily    chlorhexidine  15 mL Mouth/Throat BID    diazePAM  5 mg Per NG tube Q8H    heparin (porcine)  5,000 Units Subcutaneous Q8H    levetiracetam IVPB  1,000 mg Intravenous Q12H    levothyroxine  100 mcg Per NG tube Before breakfast    metoprolol tartrate  25 mg Per NG tube BID    mupirocin  1 g Nasal BID    pantoprazole  40 mg Per NG tube Daily    polyethylene glycol  17 g Per NG tube Daily    thiamine (VITAMIN B1) IVPB  100 mg Intravenous Daily     PRN Meds:acetaminophen, acetaminophen, clonazePAM, Dextrose 10% Bolus, glucagon (human recombinant), HYDROcodone-acetaminophen, insulin aspart U-100, magnesium oxide, magnesium oxide, ondansetron, potassium chloride 10%, potassium chloride 10%, potassium chloride 10%, potassium, sodium phosphates, potassium, sodium phosphates, potassium, sodium phosphates, sodium chloride 0.9%     Review of Systems   Unable to obtain due to intubation and altered mental status  Objective:     Weight: 102.1 kg (225 lb)  Body mass index is 30.52 kg/m².  Vital Signs (Most Recent):  Temp: 98.8 °F (37.1 °C) (12/03/19 1102)  Pulse: 85 (12/03/19 1102)  Resp: 20 (12/03/19 1102)  BP: 127/60 (12/03/19 1102)  SpO2: 97 % (12/03/19 1102) Vital Signs (24h Range):  Temp:  [98.5 °F (36.9 °C)-99.5 °F (37.5 °C)] 98.8 °F (37.1 °C)  Pulse:  [] 85  Resp:  [13-23] 20  SpO2:  [96 %-100 %] 97 %  BP: (108-156)/(55-83) 127/60  Arterial Line BP: (104-167)/(51-77) 152/68     Date 12/03/19 0700 - 12/04/19 0659   Shift 1764-2077 3737-1923 4952-4785 24 Hour Total   INTAKE   I.V.(mL/kg) 554.4(5.4)    554.4(5.4)   NG/GT 0   0   IV Piggyback 150   150   Shift Total(mL/kg) 704.4(6.9)   704.4(6.9)   OUTPUT   Urine(mL/kg/hr) 525   525   Drains 85   85   Shift Total(mL/kg) 610(6)   610(6)   Weight (kg) 102.1 102.1 102.1 102.1              Vent Mode: Spont  Oxygen Concentration (%):  [40] 40  Resp Rate Total:  [13 br/min-22 br/min] 20 br/min  PEEP/CPAP:  [8 cmH20] 8 cmH20  Pressure Support:  [12 cmH20] 12 cmH20  Mean Airway Pressure:  [11 ulR67-18 cmH20] 12 cmH20         NG/OG Tube 12/02/19 0001 Left nostril (Active)   Placement Check placement verified by aspirate characteristics;placement verified by distal tube length measurement;placement verified by x-ray 12/3/2019  7:02 AM   Advancement advanced manually 12/3/2019  7:02 AM   Tolerance no signs/symptoms of discomfort 12/3/2019  7:02 AM   Securement secured to nostril center w/ adhesive device 12/3/2019  7:02 AM   Clamp Status/Tolerance clamped;no residual 12/3/2019  7:02 AM   Suction Setting/Drainage Method dependent drainage 12/3/2019  7:02 AM   Insertion Site Appearance no redness, warmth, tenderness, skin breakdown, drainage 12/3/2019  7:02 AM   Drainage None 12/3/2019  7:02 AM   Flush/Irrigation flushed w/;water;no resistance met 12/3/2019  7:02 AM   Feeding Method continuous 12/3/2019  3:05 AM   Feeding Action feeding held 12/3/2019  7:02 AM   Current Rate (mL/hr) 0 mL/hr 12/3/2019  7:02 AM   Goal Rate (mL/hr) 65 mL/hr 12/3/2019  7:02 AM   Intake (mL) 25 mL 12/2/2019  8:05 PM   Rate Formula Tube Feeding (mL/hr) 65 mL/hr 12/3/2019  3:05 AM   Formula Name Glucerna 12/3/2019  7:02 AM   Intake (mL) - Formula Tube Feeding 0 12/3/2019 11:02 AM   Residual Amount (ml) 0 ml 12/3/2019  7:02 AM       Neurosurgery Physical Exam  Opening eyes to stimulation  Face symmetrical and pupil reactive  Intubated and sedated  Localizing all extremities  Moving all Extremities spontaneously  Nonfocal exam  EEG in place    Significant Labs:  Recent Labs   Lab 12/02/19  0138  12/03/19  0130   * 125*    140   K 3.8 3.7    108   CO2 26 26   BUN 11 7*   CREATININE 0.8 0.7   CALCIUM 7.6* 7.8*   MG 2.0 2.1     Recent Labs   Lab 12/02/19 0138 12/03/19 0130   WBC 8.85 8.48   HGB 9.1* 8.3*   HCT 27.6* 25.7*    170     Recent Labs   Lab 12/02/19 0138   INR 1.0   APTT 24.1     Microbiology Results (last 7 days)     Procedure Component Value Units Date/Time    Culture, Respiratory with Gram Stain [[id number]]  (Abnormal) Collected:  12/02/19 0825    Order Status:  Completed Specimen:  Respiratory from Tracheal Aspirate Updated:  12/03/19 0810     Respiratory Culture GRAM NEGATIVE LE  Many  Identification and susceptibility pending       Gram Stain (Respiratory) <10 epithelial cells per low power field.     Gram Stain (Respiratory) No WBC's     Gram Stain (Respiratory) Moderate Gram negative rods    Blood culture [[id number]] Collected:  12/02/19 0824    Order Status:  Completed Specimen:  Blood from Peripheral, Hand, Right Updated:  12/02/19 1715     Blood Culture, Routine No Growth to date        Recent Lab Results       12/03/19  0450   12/03/19  0130   12/02/19  2343   12/02/19  1421        Albumin   2.8         Alcohol, Medical, Serum       <10     Alkaline Phosphatase   40         Allens Test N/A           ALT   27         Anion Gap   6         AST   34         Baso #   0.02         Basophil%   0.2         BILIRUBIN TOTAL   0.4  Comment:  For infants and newborns, interpretation of results should be based  on gestational age, weight and in agreement with clinical  observations.  Premature Infant recommended reference ranges:  Up to 24 hours.............<8.0 mg/dL  Up to 48 hours............<12.0 mg/dL  3-5 days..................<15.0 mg/dL  6-29 days.................<15.0 mg/dL           Site Santhosh/UAC           BUN, Bld   7         Calcium   7.8         Chloride   108         CO2   26         CPK   918         Creatinine   0.7         DelSys Adult Vent            Differential Method   Automated         eGFR if    >60.0         eGFR if non    >60.0  Comment:  Calculation used to obtain the estimated glomerular filtration  rate (eGFR) is the CKD-EPI equation.            Eos #   0.1         Eosinophil%   0.7         FiO2 40           Glucose   125         Gran # (ANC)   6.9         Gran%   81.2         Hematocrit   25.7         Hemoglobin   8.3         Immature Grans (Abs)   0.03  Comment:  Mild elevation in immature granulocytes is non specific and   can be seen in a variety of conditions including stress response,   acute inflammation, trauma and pregnancy. Correlation with other   laboratory and clinical findings is essential.           Immature Granulocytes   0.4         Lymph #   0.9         Lymph%   10.5         Magnesium   2.1         MAP 11           MCH   31.8         MCHC   32.3         MCV   99         Min Vol 7.73           Mode PSV           Mono #   0.6         Mono%   7.0         MPV   10.5         nRBC   0         PEEP 8           Phosphorus   2.1         Platelets   170         POC BE 2           POC HCO3 27.2           POC PCO2 46.5           POC PH 7.374           POC PO2 132           POC SATURATED O2 99           POC TCO2 29           POCT Glucose     116       Potassium   3.7         PROTEIN TOTAL   5.4         PS 12           RBC   2.61         RDW   14.6         Sample ARTERIAL           Sodium   140         Sp02 98           Spont Rate 15           Vol 508           WBC   8.48               Significant Diagnostics:  CT: No results found in the last 24 hours.

## 2019-12-03 NOTE — PROGRESS NOTES
[facility]  Neurocritical Care  Progress Note    Admit Date: 12/1/2019  Service Date: 12/03/2019  Length of Stay: 2    Subjective:     Chief Complaint: SDH (subdural hematoma)    History of Present Illness: Mr. Linda is a 64 year old male with a PMHx of HTN, HLD, DM2, thyroid disease, anxiety, toe amputation left, and hx of prostate cancer presents as a transfer from [facility] to Neuro Critical Care s/p SDH after mechanical fall +blood alcohol level 216 and getting out of car and hitting head against concrete.  CT Head reveals acute subdural hemorrhage along the right cerebral convexity with worsening mass effect and new leftward midline shift of approximately 12 mm and partial effacement of the basal cisterns. On examination, patient is awake, alert, oriented X 2, follows simple commands, left sided weakness, and left facial droop. No reports of blood thinners.Patient will be Class A to the OR with NGSY. Patient will be admitted to Neuro Critical Care for a higher level of care.     Hospital Course: 12/1 admit to [facility] s/p SDH after mechanical fall +blood alcohol level 216 and getting out of car and hitting head against concrete  12/2: attempted to wean propofol-pt very agitated, continue continue scheduled diazepam 5mg TID, SBP <160, add metoprolol BID,CXR, bisacodyl,SBT and nutrition consult.   12/3: Agitated overnight, Precedex gtt added to Prop. DC'd this AM for extubation. Subgaleal drain removed this AM. Will obtain CTH post drain pull. Continues on TID Valium for withdrawal.     Interval History:  Agitated overnight, Precedex gtt added to Prop. DC'd this AM for extubation. Subgaleal drain removed this AM. Will obtain CTH post drain pull. Continues on TID Valium for withdrawal.     Review of Systems  Unable to obtain a complete ROS due to intubation  Objective:     Vitals:  Temp: 98.8 °F (37.1 °C)  Pulse: 85  Rhythm: normal sinus rhythm  BP: 127/60  MAP (mmHg): 87  Resp: 20  SpO2:  97 %  Oxygen Concentration (%): 40  O2 Device (Oxygen Therapy): ventilator  Vent Mode: Spont  Pressure Support: 12 cmH20  PEEP/CPAP: 8 cmH20  Peak Airway Pressure: 20 cmH2O  Mean Airway Pressure: 12 cmH20  Plateau Pressure: 0 cmH20    Temp  Min: 98.5 °F (36.9 °C)  Max: 99.5 °F (37.5 °C)  Pulse  Min: 75  Max: 107  BP  Min: 108/55  Max: 156/77  MAP (mmHg)  Min: 76  Max: 109  Resp  Min: 13  Max: 23  SpO2  Min: 96 %  Max: 100 %  Oxygen Concentration (%)  Min: 40  Max: 40    12/02 0701 - 12/03 0700  In: 3548 [I.V.:2463]  Out: 4685 [Urine:4350; Drains:335]   Unmeasured Output  Urine Occurrence: 1  Stool Occurrence: 0  Pad Count: 3       Physical Exam  GA: comfortable, no acute distress.   HEENT: No scleral icterus or JVD.   Pulmonary: intubated, mechanical breath sounds  Cardiac: RRR   Abdominal: Abdomen soft  Skin: No jaundice, rashes, or visible lesions.  Neuro:  --GCS: E4 VT1 M6  --Mental Status:  Opens eyes to voice  --Pupils 4mm, PERRL.   --Corneal reflex, gag, cough intact.  --LIPSCOMB spont, follows some commands as sedation weaned      Medications:  Continuous  sodium chloride 0.9% Last Rate: 75 mL/hr at 12/03/19 1102   Scheduled  albuterol-ipratropium 3 mL Q4H WAKE   amlodipine-benazepril 5-10 mg 1 capsule Daily   bisacodyl 10 mg Daily   chlorhexidine 15 mL BID   diazePAM 5 mg Q8H   heparin (porcine) 5,000 Units Q8H   levetiracetam IVPB 1,000 mg Q12H   levothyroxine 100 mcg Before breakfast   metoprolol tartrate 25 mg BID   mupirocin 1 g BID   pantoprazole 40 mg Daily   polyethylene glycol 17 g Daily   thiamine (VITAMIN B1) IVPB 100 mg Daily   PRN  acetaminophen 650 mg Q6H PRN   acetaminophen 650 mg Q6H PRN   clonazePAM 1 mg BID PRN   Dextrose 10% Bolus 12.5 g PRN   glucagon (human recombinant) 1 mg PRN   HYDROcodone-acetaminophen 1 tablet Q4H PRN   insulin aspart U-100 1-10 Units Q6H PRN   magnesium oxide 800 mg PRN   magnesium oxide 800 mg PRN   ondansetron 4 mg Q8H PRN   potassium chloride 10% 40 mEq PRN    potassium chloride 10% 40 mEq PRN   potassium chloride 10% 60 mEq PRN   potassium, sodium phosphates 2 packet PRN   potassium, sodium phosphates 2 packet PRN   potassium, sodium phosphates 2 packet PRN   sodium chloride 0.9% 10 mL PRN     Today I personally reviewed pertinent medications, notably:  DC ancef once drain removed  5 TID Valium  NS @75  CPT/Duonebs added      Diet  Diet NPO  Diet NPO    NPO for extubation      Assessment/Plan:     Neuro  * SDH (subdural hematoma)  Right Traumatic SDH   --Continue Neuro checks q 1hr  -- 12/2: POD#2s/p right craniotomy for SDH evacuation (12/1)  -- continue cEEG, f/up per epilepsy  -- Subgaleal drain removed this AM per NSGY, abx d/c'd  - SBP <160   -- continue valium 5 mg TID-for agitation        Seizure prophylaxis  -12/2: on EEG-eeg suppressed per epilepsy team   -keppra 1gm BID    Pulmonary  Acute respiratory failure with hypoxia and hypercapnia  Off sedation  Plan for extubation this AM  CPT/Duonebs ordered q4h    Cardiac/Vascular  Hyperlipidemia  --lipid panel complete  -- Atorvastatin 80 mg daily     Essential hypertension  Hypertension  -- Continue to monitor HR and BP   --SBP goal < 160  · -- 2D echo-Concentric left ventricular remodeling.  · Normal left ventricular systolic function. The estimated ejection fraction is 60%  · Normal right ventricular systolic function.  · Normal LV diastolic function.  No wall motion abnormalities  --Continue home med amlodipine-benazepril 5-10 daily      Renal/  Hypovolemia  -continue IVF, NS@75    Endocrine  Hypothyroidism  -TSH 1.114  - Continue levothyroxine 100 mcg daily     Diabetes mellitus  -- PlqdU7r 6.3  -- SSI     GI  Transaminase or LDH elevation  -trending down     Orthopedic  Non-traumatic rhabdomyolysis  --CK daily  --continue IVF  --trending down           The patient is being Prophylaxed for:  Venous Thromboembolism with: Mechanical or Chemical  Stress Ulcer with: PPI  Ventilator Pneumonia with: chlorhexidine  oral care    Activity Orders          Diet NPO: NPO starting at 12/01 1138        Full Code    Adrienne Gooden MD  Neurocritical Care  [facility]

## 2019-12-03 NOTE — PLAN OF CARE
CM met with patient in room for Discharge Planning Assessment.   Per patient, he lives with his wife in a single story home with no steps to enter.  Patient stated that he was independent and used no dme prior to admit.  Per patient, he will have assistance from his wife upon discharge.  Discharge Planning Booklet given to patient/family and discussed.  All questions addressed.       12/03/19 0031   Discharge Assessment   Assessment Type Discharge Planning Reassessment   Confirmed/corrected address and phone number on facesheet? Yes   Assessment information obtained from? Patient   Expected Length of Stay (days) 7   Communicated expected length of stay with patient/caregiver yes   Prior to hospitilization cognitive status: Alert/Oriented   Prior to hospitalization functional status: Independent   Current cognitive status: Alert/Oriented   Current Functional Status: Needs Assistance   Lives With spouse   Able to Return to Prior Arrangements yes   Is patient able to care for self after discharge? Unable to determine at this time (comments)   Who are your caregiver(s) and their phone number(s)? Alyssia Linda (wife) [phone]   Patient's perception of discharge disposition rehab facility   Readmission Within the Last 30 Days no previous admission in last 30 days   Patient currently being followed by outpatient case management? No   Patient currently receives any other outside agency services? No   Equipment Currently Used at Home none   Do you have any problems affording any of your prescribed medications? No   Is the patient taking medications as prescribed? yes   Does the patient have transportation home? Yes   Transportation Anticipated family or friend will provide   Does the patient receive services at the [facility]? No   Discharge Plan A Rehab   Discharge Plan B Skilled Nursing Facility   DME Needed Upon Discharge  other (see comments)  (tbd)           PCP:  [facility]    Jacque      Pharmacy:    [facility] [id number] [facility] - [place], [place] - [address] [facility]  [address] [facility]  [place] [address]  Phone: [phone] Fax: [phone]        Emergency Contacts:  Extended Emergency Contact Information  Primary Emergency Contact: Maranda Linda)   [place]  Mobile Phone: [phone]  Relation: Spouse      Insurance:  Payor: MEDICARE / Plan: MEDICARE PART A & B / Product Type: [facility] /     Leticia Ritchie RN, CCRN-K, CCM  Neuro-Critical Care   X [phone]

## 2019-12-03 NOTE — SIGNIFICANT EVENT
~1600 called to bedside for seizure. Noted to have L gaze deviation followed by gtc and hypoxia lasting ~1min. Did not return to baseline within 1 hour with labored breathing. In setting of recent surgery, pt was intubated to facilitate imaging (stable). Tx with ativan and increased keppra.     I spent 31 min of uninterrupted critical care time caring for this critically ill patient exclusive of procedures and teaching.

## 2019-12-03 NOTE — PROGRESS NOTES
[facility]  Neurosurgery  Progress Note    Subjective:     History of Present Illness: 64yom w pmh of HTN, HLD, DM2, thyroid disease, anxiety, and hx of prostate cancer presents after mechanical fall getting out of car and hitting head against [facility] to have R [facility]H. pt transferred to [facility] and repeat CTH showed worsening SDH with icreased mass effect, and decline in mental status in ED.    Post-Op Info:  Procedure(s) (LRB):  Right CRANIOTOMY, FOR SUBDURAL HEMATOMA EVACUATION  (Right)   2 Days Post-Op     Interval History: No acute event overnight.  He remains on EEG and propofol.  No clinical seizures.  Last follow-up CT satisfactory.  Exam stable    Medications:  Continuous Infusions:   sodium chloride 0.9% 75 mL/hr at 12/03/19 1102     Scheduled Meds:   albuterol-ipratropium  3 mL Nebulization Q4H WAKE    amlodipine-benazepril 5-10 mg  1 capsule Per NG tube Daily    bisacodyl  10 mg Rectal Daily    chlorhexidine  15 mL Mouth/Throat BID    diazePAM  5 mg Per NG tube Q8H    heparin (porcine)  5,000 Units Subcutaneous Q8H    levetiracetam IVPB  1,000 mg Intravenous Q12H    levothyroxine  100 mcg Per NG tube Before breakfast    metoprolol tartrate  25 mg Per NG tube BID    mupirocin  1 g Nasal BID    pantoprazole  40 mg Per NG tube Daily    polyethylene glycol  17 g Per NG tube Daily    thiamine (VITAMIN B1) IVPB  100 mg Intravenous Daily     PRN Meds:acetaminophen, acetaminophen, clonazePAM, Dextrose 10% Bolus, glucagon (human recombinant), HYDROcodone-acetaminophen, insulin aspart U-100, magnesium oxide, magnesium oxide, ondansetron, potassium chloride 10%, potassium chloride 10%, potassium chloride 10%, potassium, sodium phosphates, potassium, sodium phosphates, potassium, sodium phosphates, sodium chloride 0.9%     Review of Systems   Unable to obtain due to intubation and altered mental status  Objective:     Weight: 102.1 kg (225 lb)  Body mass index is  30.52 kg/m².  Vital Signs (Most Recent):  Temp: 98.8 °F (37.1 °C) (12/03/19 1102)  Pulse: 85 (12/03/19 1102)  Resp: 20 (12/03/19 1102)  BP: 127/60 (12/03/19 1102)  SpO2: 97 % (12/03/19 1102) Vital Signs (24h Range):  Temp:  [98.5 °F (36.9 °C)-99.5 °F (37.5 °C)] 98.8 °F (37.1 °C)  Pulse:  [] 85  Resp:  [13-23] 20  SpO2:  [96 %-100 %] 97 %  BP: (108-156)/(55-83) 127/60  Arterial Line BP: (104-167)/(51-77) 152/68     Date 12/03/19 0700 - 12/04/19 0659   Shift 2687-3497 5997-2233 4655-2219 24 Hour Total   INTAKE   I.V.(mL/kg) 554.4(5.4)   554.4(5.4)   NG/GT 0   0   IV Piggyback 150   150   Shift Total(mL/kg) 704.4(6.9)   704.4(6.9)   OUTPUT   Urine(mL/kg/hr) 525   525   Drains 85   85   Shift Total(mL/kg) 610(6)   610(6)   Weight (kg) 102.1 102.1 102.1 102.1              Vent Mode: Spont  Oxygen Concentration (%):  [40] 40  Resp Rate Total:  [13 br/min-22 br/min] 20 br/min  PEEP/CPAP:  [8 cmH20] 8 cmH20  Pressure Support:  [12 cmH20] 12 cmH20  Mean Airway Pressure:  [11 dxP38-32 cmH20] 12 cmH20         NG/OG Tube 12/02/19 0001 Left nostril (Active)   Placement Check placement verified by aspirate characteristics;placement verified by distal tube length measurement;placement verified by x-ray 12/3/2019  7:02 AM   Advancement advanced manually 12/3/2019  7:02 AM   Tolerance no signs/symptoms of discomfort 12/3/2019  7:02 AM   Securement secured to nostril center w/ adhesive device 12/3/2019  7:02 AM   Clamp Status/Tolerance clamped;no residual 12/3/2019  7:02 AM   Suction Setting/Drainage Method dependent drainage 12/3/2019  7:02 AM   Insertion Site Appearance no redness, warmth, tenderness, skin breakdown, drainage 12/3/2019  7:02 AM   Drainage None 12/3/2019  7:02 AM   Flush/Irrigation flushed w/;water;no resistance met 12/3/2019  7:02 AM   Feeding Method continuous 12/3/2019  3:05 AM   Feeding Action feeding held 12/3/2019  7:02 AM   Current Rate (mL/hr) 0 mL/hr 12/3/2019  7:02 AM   Goal Rate (mL/hr) 65 mL/hr  12/3/2019  7:02 AM   Intake (mL) 25 mL 12/2/2019  8:05 PM   Rate Formula Tube Feeding (mL/hr) 65 mL/hr 12/3/2019  3:05 AM   Formula Name Kristina 12/3/2019  7:02 AM   Intake (mL) - Formula Tube Feeding 0 12/3/2019 11:02 AM   Residual Amount (ml) 0 ml 12/3/2019  7:02 AM       Neurosurgery Physical Exam  Opening eyes to stimulation  Face symmetrical and pupil reactive  Intubated and sedated  Localizing all extremities  Moving all Extremities spontaneously  Nonfocal exam  EEG in place    Significant Labs:  Recent Labs   Lab 12/02/19 0138 12/03/19 0130   * 125*    140   K 3.8 3.7    108   CO2 26 26   BUN 11 7*   CREATININE 0.8 0.7   CALCIUM 7.6* 7.8*   MG 2.0 2.1     Recent Labs   Lab 12/02/19 0138 12/03/19 0130   WBC 8.85 8.48   HGB 9.1* 8.3*   HCT 27.6* 25.7*    170     Recent Labs   Lab 12/02/19 0138   INR 1.0   APTT 24.1     Microbiology Results (last 7 days)     Procedure Component Value Units Date/Time    Culture, Respiratory with Gram Stain [[id number]]  (Abnormal) Collected:  12/02/19 0825    Order Status:  Completed Specimen:  Respiratory from Tracheal Aspirate Updated:  12/03/19 0810     Respiratory Culture GRAM NEGATIVE LE  Many  Identification and susceptibility pending       Gram Stain (Respiratory) <10 epithelial cells per low power field.     Gram Stain (Respiratory) No WBC's     Gram Stain (Respiratory) Moderate Gram negative rods    Blood culture [[id number]] Collected:  12/02/19 0824    Order Status:  Completed Specimen:  Blood from Peripheral, Hand, Right Updated:  12/02/19 1715     Blood Culture, Routine No Growth to date        Recent Lab Results       12/03/19  0450   12/03/19  0130   12/02/19  2343   12/02/19  1421        Albumin   2.8         Alcohol, Medical, Serum       <10     Alkaline Phosphatase   40         Allens Test N/A           ALT   27         Anion Gap   6         AST   34         Baso #   0.02         Basophil%   0.2         BILIRUBIN TOTAL    0.4  Comment:  For infants and newborns, interpretation of results should be based  on gestational age, weight and in agreement with clinical  observations.  Premature Infant recommended reference ranges:  Up to 24 hours.............<8.0 mg/dL  Up to 48 hours............<12.0 mg/dL  3-5 days..................<15.0 mg/dL  6-29 days.................<15.0 mg/dL           Site Santhosh/UAC           BUN, Bld   7         Calcium   7.8         Chloride   108         CO2   26         CPK   918         Creatinine   0.7         DelSys Adult Vent           Differential Method   Automated         eGFR if    >60.0         eGFR if non    >60.0  Comment:  Calculation used to obtain the estimated glomerular filtration  rate (eGFR) is the CKD-EPI equation.            Eos #   0.1         Eosinophil%   0.7         FiO2 40           Glucose   125         Gran # (ANC)   6.9         Gran%   81.2         Hematocrit   25.7         Hemoglobin   8.3         Immature Grans (Abs)   0.03  Comment:  Mild elevation in immature granulocytes is non specific and   can be seen in a variety of conditions including stress response,   acute inflammation, trauma and pregnancy. Correlation with other   laboratory and clinical findings is essential.           Immature Granulocytes   0.4         Lymph #   0.9         Lymph%   10.5         Magnesium   2.1         MAP 11           MCH   31.8         MCHC   32.3         MCV   99         Min Vol 7.73           Mode PSV           Mono #   0.6         Mono%   7.0         MPV   10.5         nRBC   0         PEEP 8           Phosphorus   2.1         Platelets   170         POC BE 2           POC HCO3 27.2           POC PCO2 46.5           POC PH 7.374           POC PO2 132           POC SATURATED O2 99           POC TCO2 29           POCT Glucose     116       Potassium   3.7         PROTEIN TOTAL   5.4         PS 12           RBC   2.61         RDW   14.6         Sample ARTERIAL            Sodium   140         Sp02 98           Spont Rate 15           Vol 508           WBC   8.48               Significant Diagnostics:  CT: No results found in the last 24 hours.    Assessment/Plan:     * SDH (subdural hematoma)  64yom w pmh of HTN, HLD, DM2, thyroid disease, anxiety, and hx of prostate cancer presents after mechanical fall getting out of car and hitting head against concrete found at [facility] ED to have R SDH. pt [place] [facility] [place] CTH showed worsening SDH with icreased mass effect, and decline in mental status in ED. Now s/p right craniotomy for SDH evacuation (12/1):    - Continue ICU care  - Q1hr neurochecks  - Will pull drain today  - Last CT is satisfactory, planning for follow-up CT after drain removal  - Follow-up final EEG.  Continue AED per ICU  - HOB>30  - Wean to extubate per NCC  - Na>135  - SBP<140  - Ok for SQH  - Daily PTOT  - Further care per ICU team  - Will continue to follow closely          Jacinta Leblanc MD  Neurosurgery  [facility]

## 2019-12-03 NOTE — SUBJECTIVE & OBJECTIVE
Interval History:  Agitated overnight, Precedex gtt added to Prop. DC'd this AM for extubation. Subgaleal drain removed this AM. Will obtain CTH post drain pull. Continues on TID Valium for withdrawal.     Review of Systems  Unable to obtain a complete ROS due to intubation  Objective:     Vitals:  Temp: 98.8 °F (37.1 °C)  Pulse: 85  Rhythm: normal sinus rhythm  BP: 127/60  MAP (mmHg): 87  Resp: 20  SpO2: 97 %  Oxygen Concentration (%): 40  O2 Device (Oxygen Therapy): ventilator  Vent Mode: Spont  Pressure Support: 12 cmH20  PEEP/CPAP: 8 cmH20  Peak Airway Pressure: 20 cmH2O  Mean Airway Pressure: 12 cmH20  Plateau Pressure: 0 cmH20    Temp  Min: 98.5 °F (36.9 °C)  Max: 99.5 °F (37.5 °C)  Pulse  Min: 75  Max: 107  BP  Min: 108/55  Max: 156/77  MAP (mmHg)  Min: 76  Max: 109  Resp  Min: 13  Max: 23  SpO2  Min: 96 %  Max: 100 %  Oxygen Concentration (%)  Min: 40  Max: 40    12/02 0701 - 12/03 0700  In: 3548 [I.V.:2463]  Out: 4685 [Urine:4350; Drains:335]   Unmeasured Output  Urine Occurrence: 1  Stool Occurrence: 0  Pad Count: 3       Physical Exam  GA: comfortable, no acute distress.   HEENT: No scleral icterus or JVD.   Pulmonary: intubated, mechanical breath sounds  Cardiac: RRR   Abdominal: Abdomen soft  Skin: No jaundice, rashes, or visible lesions.  Neuro:  --GCS: E4 VT1 M6  --Mental Status:  Opens eyes to voice  --Pupils 4mm, PERRL.   --Corneal reflex, gag, cough intact.  --LIPSCOMB spont, follows some commands as sedation weaned      Medications:  Continuous  sodium chloride 0.9% Last Rate: 75 mL/hr at 12/03/19 1102   Scheduled  albuterol-ipratropium 3 mL Q4H WAKE   amlodipine-benazepril 5-10 mg 1 capsule Daily   bisacodyl 10 mg Daily   chlorhexidine 15 mL BID   diazePAM 5 mg Q8H   heparin (porcine) 5,000 Units Q8H   levetiracetam IVPB 1,000 mg Q12H   levothyroxine 100 mcg Before breakfast   metoprolol tartrate 25 mg BID   mupirocin 1 g BID   pantoprazole 40 mg Daily   polyethylene glycol 17 g Daily   thiamine  (VITAMIN B1) IVPB 100 mg Daily   PRN  acetaminophen 650 mg Q6H PRN   acetaminophen 650 mg Q6H PRN   clonazePAM 1 mg BID PRN   Dextrose 10% Bolus 12.5 g PRN   glucagon (human recombinant) 1 mg PRN   HYDROcodone-acetaminophen 1 tablet Q4H PRN   insulin aspart U-100 1-10 Units Q6H PRN   magnesium oxide 800 mg PRN   magnesium oxide 800 mg PRN   ondansetron 4 mg Q8H PRN   potassium chloride 10% 40 mEq PRN   potassium chloride 10% 40 mEq PRN   potassium chloride 10% 60 mEq PRN   potassium, sodium phosphates 2 packet PRN   potassium, sodium phosphates 2 packet PRN   potassium, sodium phosphates 2 packet PRN   sodium chloride 0.9% 10 mL PRN     Today I personally reviewed pertinent medications, notably:  DC ancef once drain removed  5 TID Valium  NS @75  CPT/Duonebs added      Diet  Diet NPO  Diet NPO    NPO for extubation

## 2019-12-03 NOTE — PLAN OF CARE
POC reviewed with Mr. Dean and family at 1700. Mr. Dean and wife verbalized understanding. Questions and concerns addressed. Pt extubated without any complications, tolerating well. Subgaleal drain removed per NSY, post CTH completed. NS @ 75ml/h. Propofol off. Pt still retaining urine, straight cath every 4 hours done, UO:~2L. TF remain off. SLP evaluated swallowing and recommends puree, thin liquids, crushed meds. NGT remains in place though for reliable medication route since pt is very confused and restless. Bowel regimen given, no BM this shift. Pt progressing toward goals. Will continue to monitor. See flowsheets for full assessment and VS info.

## 2019-12-03 NOTE — NURSING
Transported pt to CT via bed with tele monitor and O2 tank. Pt tolerated well. VSS. Will continue to monitor very closely.

## 2019-12-04 PROBLEM — R33.9 URINARY RETENTION: Status: ACTIVE | Noted: 2019-12-04

## 2019-12-04 LAB
ALBUMIN SERPL BCP-MCNC: 3.2 G/DL (ref 3.5–5.2)
ALP SERPL-CCNC: 52 U/L (ref 55–135)
ALT SERPL W/O P-5'-P-CCNC: 30 U/L (ref 10–44)
ANION GAP SERPL CALC-SCNC: 10 MMOL/L (ref 8–16)
AST SERPL-CCNC: 55 U/L (ref 10–40)
BACTERIA SPEC AEROBE CULT: ABNORMAL
BACTERIA SPEC AEROBE CULT: ABNORMAL
BASOPHILS # BLD AUTO: 0.02 K/UL (ref 0–0.2)
BASOPHILS NFR BLD: 0.3 % (ref 0–1.9)
BILIRUB SERPL-MCNC: 0.8 MG/DL (ref 0.1–1)
BLD PROD TYP BPU: NORMAL
BLD PROD TYP BPU: NORMAL
BLOOD UNIT EXPIRATION DATE: NORMAL
BLOOD UNIT EXPIRATION DATE: NORMAL
BLOOD UNIT TYPE CODE: 5100
BLOOD UNIT TYPE CODE: 5100
BLOOD UNIT TYPE: NORMAL
BLOOD UNIT TYPE: NORMAL
BUN SERPL-MCNC: 6 MG/DL (ref 8–23)
CALCIUM SERPL-MCNC: 8.6 MG/DL (ref 8.7–10.5)
CHLORIDE SERPL-SCNC: 107 MMOL/L (ref 95–110)
CK SERPL-CCNC: 1544 U/L (ref 20–200)
CO2 SERPL-SCNC: 23 MMOL/L (ref 23–29)
CODING SYSTEM: NORMAL
CODING SYSTEM: NORMAL
CREAT SERPL-MCNC: 0.7 MG/DL (ref 0.5–1.4)
DIFFERENTIAL METHOD: ABNORMAL
DISPENSE STATUS: NORMAL
DISPENSE STATUS: NORMAL
EOSINOPHIL # BLD AUTO: 0.1 K/UL (ref 0–0.5)
EOSINOPHIL NFR BLD: 0.9 % (ref 0–8)
ERYTHROCYTE [DISTWIDTH] IN BLOOD BY AUTOMATED COUNT: 13.9 % (ref 11.5–14.5)
EST. GFR  (AFRICAN AMERICAN): >60 ML/MIN/1.73 M^2
EST. GFR  (NON AFRICAN AMERICAN): >60 ML/MIN/1.73 M^2
GLUCOSE SERPL-MCNC: 122 MG/DL (ref 70–110)
GRAM STN SPEC: ABNORMAL
HCT VFR BLD AUTO: 27.9 % (ref 40–54)
HGB BLD-MCNC: 9.1 G/DL (ref 14–18)
IMM GRANULOCYTES # BLD AUTO: 0.03 K/UL (ref 0–0.04)
IMM GRANULOCYTES NFR BLD AUTO: 0.4 % (ref 0–0.5)
LYMPHOCYTES # BLD AUTO: 0.8 K/UL (ref 1–4.8)
LYMPHOCYTES NFR BLD: 10.5 % (ref 18–48)
MAGNESIUM SERPL-MCNC: 1.9 MG/DL (ref 1.6–2.6)
MCH RBC QN AUTO: 31.2 PG (ref 27–31)
MCHC RBC AUTO-ENTMCNC: 32.6 G/DL (ref 32–36)
MCV RBC AUTO: 96 FL (ref 82–98)
MONOCYTES # BLD AUTO: 0.4 K/UL (ref 0.3–1)
MONOCYTES NFR BLD: 5.6 % (ref 4–15)
NEUTROPHILS # BLD AUTO: 6.5 K/UL (ref 1.8–7.7)
NEUTROPHILS NFR BLD: 82.3 % (ref 38–73)
NRBC BLD-RTO: 0 /100 WBC
PHOSPHATE SERPL-MCNC: 2.5 MG/DL (ref 2.7–4.5)
PHOSPHATE SERPL-MCNC: 3 MG/DL (ref 2.7–4.5)
PLATELET # BLD AUTO: 208 K/UL (ref 150–350)
PMV BLD AUTO: 10.1 FL (ref 9.2–12.9)
POCT GLUCOSE: 111 MG/DL (ref 70–110)
POCT GLUCOSE: 118 MG/DL (ref 70–110)
POCT GLUCOSE: 136 MG/DL (ref 70–110)
POCT GLUCOSE: 140 MG/DL (ref 70–110)
POTASSIUM SERPL-SCNC: 3.6 MMOL/L (ref 3.5–5.1)
POTASSIUM SERPL-SCNC: 4.1 MMOL/L (ref 3.5–5.1)
PROT SERPL-MCNC: 6.5 G/DL (ref 6–8.4)
RBC # BLD AUTO: 2.92 M/UL (ref 4.6–6.2)
SODIUM SERPL-SCNC: 140 MMOL/L (ref 136–145)
TRANS ERYTHROCYTES VOL PATIENT: NORMAL ML
TRANS ERYTHROCYTES VOL PATIENT: NORMAL ML
WBC # BLD AUTO: 7.83 K/UL (ref 3.9–12.7)

## 2019-12-04 PROCEDURE — 25000242 PHARM REV CODE 250 ALT 637 W/ HCPCS: Performed by: STUDENT IN AN ORGANIZED HEALTH CARE EDUCATION/TRAINING PROGRAM

## 2019-12-04 PROCEDURE — 99291 PR CRITICAL CARE, E/M 30-74 MINUTES: ICD-10-PCS | Mod: GC,,, | Performed by: PSYCHIATRY & NEUROLOGY

## 2019-12-04 PROCEDURE — 51702 INSERT TEMP BLADDER CATH: CPT

## 2019-12-04 PROCEDURE — 25000003 PHARM REV CODE 250: Performed by: STUDENT IN AN ORGANIZED HEALTH CARE EDUCATION/TRAINING PROGRAM

## 2019-12-04 PROCEDURE — 97535 SELF CARE MNGMENT TRAINING: CPT

## 2019-12-04 PROCEDURE — 25000003 PHARM REV CODE 250: Performed by: PSYCHIATRY & NEUROLOGY

## 2019-12-04 PROCEDURE — 80053 COMPREHEN METABOLIC PANEL: CPT

## 2019-12-04 PROCEDURE — 92507 TX SP LANG VOICE COMM INDIV: CPT

## 2019-12-04 PROCEDURE — 97162 PT EVAL MOD COMPLEX 30 MIN: CPT

## 2019-12-04 PROCEDURE — 94761 N-INVAS EAR/PLS OXIMETRY MLT: CPT

## 2019-12-04 PROCEDURE — 83735 ASSAY OF MAGNESIUM: CPT

## 2019-12-04 PROCEDURE — 25000003 PHARM REV CODE 250: Performed by: NURSE PRACTITIONER

## 2019-12-04 PROCEDURE — 20000000 HC ICU ROOM

## 2019-12-04 PROCEDURE — 63600175 PHARM REV CODE 636 W HCPCS: Performed by: NURSE PRACTITIONER

## 2019-12-04 PROCEDURE — 25000003 PHARM REV CODE 250

## 2019-12-04 PROCEDURE — 99291 CRITICAL CARE FIRST HOUR: CPT | Mod: GC,,, | Performed by: PSYCHIATRY & NEUROLOGY

## 2019-12-04 PROCEDURE — 94640 AIRWAY INHALATION TREATMENT: CPT

## 2019-12-04 PROCEDURE — 84100 ASSAY OF PHOSPHORUS: CPT

## 2019-12-04 PROCEDURE — 82550 ASSAY OF CK (CPK): CPT

## 2019-12-04 PROCEDURE — 97166 OT EVAL MOD COMPLEX 45 MIN: CPT

## 2019-12-04 PROCEDURE — 27000221 HC OXYGEN, UP TO 24 HOURS

## 2019-12-04 PROCEDURE — 84132 ASSAY OF SERUM POTASSIUM: CPT

## 2019-12-04 PROCEDURE — 51798 US URINE CAPACITY MEASURE: CPT

## 2019-12-04 PROCEDURE — 63600175 PHARM REV CODE 636 W HCPCS: Performed by: PHYSICIAN ASSISTANT

## 2019-12-04 PROCEDURE — 85025 COMPLETE CBC W/AUTO DIFF WBC: CPT

## 2019-12-04 PROCEDURE — 84100 ASSAY OF PHOSPHORUS: CPT | Mod: 91

## 2019-12-04 PROCEDURE — 63600175 PHARM REV CODE 636 W HCPCS: Performed by: STUDENT IN AN ORGANIZED HEALTH CARE EDUCATION/TRAINING PROGRAM

## 2019-12-04 PROCEDURE — 97530 THERAPEUTIC ACTIVITIES: CPT

## 2019-12-04 PROCEDURE — 63600175 PHARM REV CODE 636 W HCPCS: Performed by: PSYCHIATRY & NEUROLOGY

## 2019-12-04 PROCEDURE — 94668 MNPJ CHEST WALL SBSQ: CPT

## 2019-12-04 RX ORDER — DILTIAZEM HYDROCHLORIDE 5 MG/ML
20 INJECTION INTRAVENOUS ONCE
Status: COMPLETED | OUTPATIENT
Start: 2019-12-04 | End: 2019-12-04

## 2019-12-04 RX ORDER — METOPROLOL TARTRATE 1 MG/ML
INJECTION, SOLUTION INTRAVENOUS
Status: COMPLETED
Start: 2019-12-04 | End: 2019-12-04

## 2019-12-04 RX ORDER — QUETIAPINE FUMARATE 25 MG/1
25 TABLET, FILM COATED ORAL NIGHTLY
Status: DISCONTINUED | OUTPATIENT
Start: 2019-12-04 | End: 2019-12-05

## 2019-12-04 RX ORDER — SILODOSIN 4 MG/1
4 CAPSULE ORAL DAILY
Status: DISCONTINUED | OUTPATIENT
Start: 2019-12-04 | End: 2019-12-05

## 2019-12-04 RX ORDER — METOPROLOL TARTRATE 1 MG/ML
5 INJECTION, SOLUTION INTRAVENOUS ONCE
Status: COMPLETED | OUTPATIENT
Start: 2019-12-04 | End: 2019-12-04

## 2019-12-04 RX ORDER — DILTIAZEM HYDROCHLORIDE 30 MG/1
30 TABLET, FILM COATED ORAL EVERY 6 HOURS
Status: DISCONTINUED | OUTPATIENT
Start: 2019-12-04 | End: 2019-12-05

## 2019-12-04 RX ORDER — HYDROCODONE BITARTRATE AND ACETAMINOPHEN 5; 325 MG/1; MG/1
2 TABLET ORAL EVERY 4 HOURS PRN
Status: DISCONTINUED | OUTPATIENT
Start: 2019-12-04 | End: 2019-12-05

## 2019-12-04 RX ORDER — DILTIAZEM HYDROCHLORIDE 5 MG/ML
10 INJECTION INTRAVENOUS ONCE
Status: COMPLETED | OUTPATIENT
Start: 2019-12-04 | End: 2019-12-04

## 2019-12-04 RX ORDER — AMANTADINE HYDROCHLORIDE 50 MG/5ML
100 SOLUTION ORAL 2 TIMES DAILY
Status: DISCONTINUED | OUTPATIENT
Start: 2019-12-04 | End: 2019-12-05

## 2019-12-04 RX ORDER — OLANZAPINE 10 MG/2ML
2.5 INJECTION, POWDER, FOR SOLUTION INTRAMUSCULAR 2 TIMES DAILY PRN
Status: DISCONTINUED | OUTPATIENT
Start: 2019-12-04 | End: 2019-12-06 | Stop reason: HOSPADM

## 2019-12-04 RX ORDER — IPRATROPIUM BROMIDE AND ALBUTEROL SULFATE 2.5; .5 MG/3ML; MG/3ML
3 SOLUTION RESPIRATORY (INHALATION) EVERY 6 HOURS PRN
Status: DISCONTINUED | OUTPATIENT
Start: 2019-12-04 | End: 2019-12-06 | Stop reason: HOSPADM

## 2019-12-04 RX ORDER — DEXMEDETOMIDINE HYDROCHLORIDE 4 UG/ML
0.2 INJECTION, SOLUTION INTRAVENOUS CONTINUOUS
Status: DISCONTINUED | OUTPATIENT
Start: 2019-12-04 | End: 2019-12-05

## 2019-12-04 RX ADMIN — AMANTADINE HYDROCHLORIDE 100 MG: 50 SOLUTION ORAL at 08:12

## 2019-12-04 RX ADMIN — SODIUM CHLORIDE 500 ML: 0.9 INJECTION, SOLUTION INTRAVENOUS at 05:12

## 2019-12-04 RX ADMIN — DILTIAZEM HYDROCHLORIDE 10 MG: 5 INJECTION INTRAVENOUS at 09:12

## 2019-12-04 RX ADMIN — DILTIAZEM HYDROCHLORIDE 30 MG: 30 TABLET, FILM COATED ORAL at 09:12

## 2019-12-04 RX ADMIN — LEVETIRACETAM 1000 MG: 10 INJECTION INTRAVENOUS at 08:12

## 2019-12-04 RX ADMIN — POTASSIUM & SODIUM PHOSPHATES POWDER PACK 280-160-250 MG 2 PACKET: 280-160-250 PACK at 05:12

## 2019-12-04 RX ADMIN — METOPROLOL TARTRATE 25 MG: 25 TABLET ORAL at 08:12

## 2019-12-04 RX ADMIN — LEVETIRACETAM 1000 MG: 10 INJECTION INTRAVENOUS at 09:12

## 2019-12-04 RX ADMIN — DEXMEDETOMIDINE HYDROCHLORIDE 0.4 MCG/KG/HR: 4 INJECTION, SOLUTION INTRAVENOUS at 05:12

## 2019-12-04 RX ADMIN — METOPROLOL TARTRATE 5 MG: 1 INJECTION, SOLUTION INTRAVENOUS at 04:12

## 2019-12-04 RX ADMIN — POLYETHYLENE GLYCOL 3350 17 G: 17 POWDER, FOR SOLUTION ORAL at 09:12

## 2019-12-04 RX ADMIN — DIAZEPAM 2.5 MG: 5 INJECTION, SOLUTION INTRAMUSCULAR; INTRAVENOUS at 12:12

## 2019-12-04 RX ADMIN — IPRATROPIUM BROMIDE AND ALBUTEROL SULFATE 3 ML: .5; 3 SOLUTION RESPIRATORY (INHALATION) at 08:12

## 2019-12-04 RX ADMIN — LEVOTHYROXINE SODIUM 100 MCG: 50 TABLET ORAL at 05:12

## 2019-12-04 RX ADMIN — DIAZEPAM 5 MG: 5 TABLET ORAL at 05:12

## 2019-12-04 RX ADMIN — HYDROCODONE BITARTRATE AND ACETAMINOPHEN 2 TABLET: 5; 325 TABLET ORAL at 01:12

## 2019-12-04 RX ADMIN — THIAMINE HYDROCHLORIDE 100 MG: 100 INJECTION, SOLUTION INTRAMUSCULAR; INTRAVENOUS at 09:12

## 2019-12-04 RX ADMIN — MUPIROCIN 1 G: 20 OINTMENT TOPICAL at 08:12

## 2019-12-04 RX ADMIN — POTASSIUM CHLORIDE 40 MEQ: 20 SOLUTION ORAL at 05:12

## 2019-12-04 RX ADMIN — AMLODIPINE AND BENAZEPRIL HYDROCHLORIDE 1 CAPSULE: 5; 10 CAPSULE ORAL at 09:12

## 2019-12-04 RX ADMIN — MUPIROCIN 1 G: 20 OINTMENT TOPICAL at 09:12

## 2019-12-04 RX ADMIN — HEPARIN SODIUM 5000 UNITS: 5000 INJECTION, SOLUTION INTRAVENOUS; SUBCUTANEOUS at 01:12

## 2019-12-04 RX ADMIN — METOPROLOL TARTRATE 5 MG: 5 INJECTION INTRAVENOUS at 04:12

## 2019-12-04 RX ADMIN — METOPROLOL TARTRATE 5 MG: 1 INJECTION, SOLUTION INTRAVENOUS at 05:12

## 2019-12-04 RX ADMIN — ACETAMINOPHEN 650 MG: 325 TABLET ORAL at 05:12

## 2019-12-04 RX ADMIN — AMANTADINE HYDROCHLORIDE 100 MG: 50 SOLUTION ORAL at 10:12

## 2019-12-04 RX ADMIN — DEXMEDETOMIDINE HYDROCHLORIDE 0.2 MCG/KG/HR: 4 INJECTION, SOLUTION INTRAVENOUS at 01:12

## 2019-12-04 RX ADMIN — METOPROLOL TARTRATE 5 MG: 5 INJECTION INTRAVENOUS at 05:12

## 2019-12-04 RX ADMIN — BISACODYL 10 MG RECTAL SUPPOSITORY 10 MG: at 09:12

## 2019-12-04 RX ADMIN — HEPARIN SODIUM 5000 UNITS: 5000 INJECTION, SOLUTION INTRAVENOUS; SUBCUTANEOUS at 05:12

## 2019-12-04 RX ADMIN — HEPARIN SODIUM 5000 UNITS: 5000 INJECTION, SOLUTION INTRAVENOUS; SUBCUTANEOUS at 09:12

## 2019-12-04 RX ADMIN — PANTOPRAZOLE SODIUM 40 MG: 40 GRANULE, DELAYED RELEASE ORAL at 09:12

## 2019-12-04 RX ADMIN — HYDROCODONE BITARTRATE AND ACETAMINOPHEN 1 TABLET: 5; 325 TABLET ORAL at 09:12

## 2019-12-04 RX ADMIN — METOPROLOL TARTRATE 5 MG: 5 INJECTION INTRAVENOUS at 10:12

## 2019-12-04 RX ADMIN — DILTIAZEM HYDROCHLORIDE 20 MG: 5 INJECTION INTRAVENOUS at 06:12

## 2019-12-04 RX ADMIN — SILODOSIN 4 MG: 4 CAPSULE ORAL at 10:12

## 2019-12-04 RX ADMIN — DILTIAZEM HYDROCHLORIDE 10 MG: 5 INJECTION INTRAVENOUS at 07:12

## 2019-12-04 RX ADMIN — HYDROCODONE BITARTRATE AND ACETAMINOPHEN 1 TABLET: 5; 325 TABLET ORAL at 05:12

## 2019-12-04 RX ADMIN — QUETIAPINE FUMARATE 25 MG: 25 TABLET ORAL at 08:12

## 2019-12-04 RX ADMIN — POTASSIUM & SODIUM PHOSPHATES POWDER PACK 280-160-250 MG 2 PACKET: 280-160-250 PACK at 09:12

## 2019-12-04 RX ADMIN — METOPROLOL TARTRATE 25 MG: 25 TABLET ORAL at 09:12

## 2019-12-04 RX ADMIN — HYDROCODONE BITARTRATE AND ACETAMINOPHEN 2 TABLET: 5; 325 TABLET ORAL at 07:12

## 2019-12-04 RX ADMIN — SODIUM CHLORIDE: 0.9 INJECTION, SOLUTION INTRAVENOUS at 04:12

## 2019-12-04 NOTE — PLAN OF CARE
Problem: Physical Therapy Goal  Goal: Physical Therapy Goal  Description  Goals to be met by: 2019     Patient will increase functional independence with mobility by performin. Supine to sit with Contact Guard Assistance  2. Sit to supine with Contact Guard Assistance  3. Sit to stand transfer with Stand-by Assistance  4. Bed to chair transfer with Minimal Assistance using least restrictive device or no AD.   5. Gait  x 40 feet with Contact Guard Assistance using least restrictive device or no AD.   6. Stand for 3 minutes with Stand-by Assistance using  No AD to improve static standing balance and prepare for functional activities in standing.   7. Lower extremity exercise program x20 reps per handout, with independence to improve strength and activity tolerance.     Outcome: Ongoing, Progressing   Evaluation completed, initiated plan of care.   Monika Ulloa, PT  2019

## 2019-12-04 NOTE — ASSESSMENT & PLAN NOTE
64yom w pmh of HTN, HLD, DM2, thyroid disease, anxiety, and hx of prostate cancer presents after mechanical fall getting out of car and hitting head against [facility] have [facility]. pt transferred to [facility] and repeat CTH showed worsening SDH with icreased mass effect, and decline in mental status in ED. Now s/p right craniotomy for SDH evacuation (12/1):    - Continue ICU care  - Q1hr neurochecks  - Follow-up final EEG.  Continue AED per ICU  - HOB>30  - Na>135  - SBP<140  - SQH  - Daily PTOT  - Further care per ICU team  - Will continue to follow closely

## 2019-12-04 NOTE — PLAN OF CARE
OT eval completed; rec rehab at this time pending progression in care. OT plan of care for 4x/w. GENA Huston 12/4/2019   Problem: Occupational Therapy Goal  Goal: Occupational Therapy Goal  Description  Goals to be met by: 12/18     Patient will increase functional independence with ADLs by performing:    Pt will report daily orientation x4 with added time and 0 added cues.  Supine<>sit with CGA and HOB flat.  UE Dressing while seated EOB with Minimal Assistance.  LB Dressing (donning pull up brief) with Minimal Assistance.   Grooming while standing with Minimal Assistance.  Toileting from bedside commode with Minimal Assistance for hygiene and clothing management.   Toilet transfer to bedside commode with Minimal Assistance.     Outcome: Ongoing, Progressing

## 2019-12-04 NOTE — ASSESSMENT & PLAN NOTE
-- SclmL4l 6.3  -- SSI   -- Glucerna TF with  q6h  -- ok for Puree/thin diet once agitation/alertness improves

## 2019-12-04 NOTE — SUBJECTIVE & OBJECTIVE
Interval History: naeon    Medications:  Continuous Infusions:   sodium chloride 0.9% 75 mL/hr at 12/04/19 0805    dexmedetomidine (PRECEDEX) infusion Stopped (12/04/19 0915)     Scheduled Meds:   albuterol-ipratropium  3 mL Nebulization Q4H WAKE    amlodipine-benazepril 5-10 mg  1 capsule Per NG tube Daily    bisacodyl  10 mg Rectal Daily    chlorhexidine  15 mL Mouth/Throat BID    diazePAM  5 mg Per NG tube Q8H    heparin (porcine)  5,000 Units Subcutaneous Q8H    levetiracetam IVPB  1,000 mg Intravenous Q12H    levothyroxine  100 mcg Per NG tube Before breakfast    metoprolol tartrate  25 mg Per NG tube BID    mupirocin  1 g Nasal BID    pantoprazole  40 mg Per NG tube Daily    polyethylene glycol  17 g Per NG tube Daily    thiamine (VITAMIN B1) IVPB  100 mg Intravenous Daily     PRN Meds:acetaminophen, acetaminophen, clonazePAM, Dextrose 10% Bolus, glucagon (human recombinant), hydrALAZINE, HYDROcodone-acetaminophen, insulin aspart U-100, labetalol, magnesium oxide, magnesium oxide, ondansetron, potassium chloride 10%, potassium chloride 10%, potassium chloride 10%, potassium, sodium phosphates, potassium, sodium phosphates, potassium, sodium phosphates, sodium chloride 0.9%     Review of Systems  Objective:     Weight: 102.1 kg (225 lb)  Body mass index is 30.52 kg/m².  Vital Signs (Most Recent):  Temp: 98.8 °F (37.1 °C) (12/04/19 0705)  Pulse: 89 (12/04/19 0905)  Resp: 20 (12/04/19 0905)  BP: (!) 161/74 (12/04/19 0905)  SpO2: 99 % (12/04/19 0905) Vital Signs (24h Range):  Temp:  [98.2 °F (36.8 °C)-99.1 °F (37.3 °C)] 98.8 °F (37.1 °C)  Pulse:  [] 89  Resp:  [12-27] 20  SpO2:  [94 %-100 %] 99 %  BP: (110-185)/(57-84) 161/74  Arterial Line BP: ()/() 183/73     Date 12/04/19 0700 - 12/05/19 0659   Shift [id number] 9094-4268 5380-0659 24 Hour Total   INTAKE   I.V.(mL/kg) 167.3(1.6)   167.3(1.6)   NG/GT 20   20   Shift Total(mL/kg) 187.3(1.8)   187.3(1.8)   OUTPUT   Urine(mL/kg/hr)  300   300   Shift Total(mL/kg) 300(2.9)   300(2.9)   Weight (kg) 102.1 102.1 102.1 102.1              Vent Mode: Spont  Oxygen Concentration (%):  [32-40] 32  Resp Rate Total:  [11 br/min-21 br/min] 11 br/min  PEEP/CPAP:  [8 cmH20] 8 cmH20  Pressure Support:  [10 cmH20] 10 cmH20  Mean Airway Pressure:  [11 cmH20] 11 cmH20         NG/OG Tube 12/02/19 0001 Left nostril (Active)   Placement Check placement verified by distal tube length measurement;placement verified by x-ray 12/4/2019  3:02 AM   Advancement advanced manually 12/3/2019  3:02 PM   Tolerance no signs/symptoms of discomfort 12/4/2019  3:02 AM   Securement secured to nostril center w/ adhesive device 12/4/2019  3:02 AM   Clamp Status/Tolerance clamped 12/4/2019  3:02 AM   Suction Setting/Drainage Method dependent drainage 12/3/2019  3:02 PM   Insertion Site Appearance no redness, warmth, tenderness, skin breakdown, drainage 12/4/2019  3:02 AM   Drainage None 12/4/2019  3:02 AM   Flush/Irrigation flushed w/;water;no resistance met 12/4/2019  3:02 AM   Feeding Method continuous 12/4/2019  3:02 AM   Feeding Action feeding held 12/4/2019  3:02 AM   Current Rate (mL/hr) 0 mL/hr 12/4/2019  3:02 AM   Goal Rate (mL/hr) 65 mL/hr 12/4/2019  3:02 AM   Intake (mL) 0 mL 12/4/2019  6:02 AM   Water Bolus (mL) 0 mL 12/4/2019  6:02 AM   Rate Formula Tube Feeding (mL/hr) 65 mL/hr 12/4/2019  3:02 AM   Formula Name Glucerna 12/4/2019  3:02 AM   Intake (mL) - Formula Tube Feeding 10 12/4/2019  8:05 AM   Residual Amount (ml) 0 ml 12/4/2019  3:02 AM       Neurosurgery Physical Exam   [id number]  PERRL  FCX4    Significant Labs:  Recent Labs   Lab 12/03/19  0130 12/03/19  1821 12/04/19  0221   *  --  122*     --  140   K 3.7 4.0 3.6     --  107   CO2 26  --  23   BUN 7*  --  6*   CREATININE 0.7  --  0.7   CALCIUM 7.8*  --  8.6*   MG 2.1  --  1.9     Recent Labs   Lab 12/03/19  0130 12/04/19  0221   WBC 8.48 7.83   HGB 8.3* 9.1*   HCT 25.7* 27.9*    208      No results for input(s): LABPT, INR, APTT in the last 48 hours.  Microbiology Results (last 7 days)     Procedure Component Value Units Date/Time    Culture, Respiratory with Gram Stain [[id number]]  (Abnormal) Collected:  12/02/19 0825    Order Status:  Completed Specimen:  Respiratory from Tracheal Aspirate Updated:  12/03/19 1317     Respiratory Culture SERRATIA MARCESCENS  Many  Susceptibility pending       Gram Stain (Respiratory) <10 epithelial cells per low power field.     Gram Stain (Respiratory) No WBC's     Gram Stain (Respiratory) Moderate Gram negative rods    Blood culture [[id number]] Collected:  12/02/19 0824    Order Status:  Completed Specimen:  Blood from Peripheral, Hand, Right Updated:  12/03/19 1212     Blood Culture, Routine No Growth to date      No Growth to date            Significant Diagnostics:  CT head: reviewed, stable post drain removal.

## 2019-12-04 NOTE — ASSESSMENT & PLAN NOTE
Right Traumatic SDH   --Continue Neuro checks q 1hr  -- 12/2: POD#3 s/p right craniotomy for SDH evacuation (12/1)  -- off cEEG, no seizure activity  -- Subgaleal drain removed, post pull CTH appropriate  -- SBP <140  -- continue valium 5 mg TID-for agitation  -- Start Amantadine BID  -- add Sertraline qhs and PRN Zyprexa for agitation  -- repeat EKG tomorrow for QTC

## 2019-12-04 NOTE — PLAN OF CARE
12/04/19 1646   Post-Acute Status   Post-Acute Authorization Placement   Post-Acute Placement Status Referrals Sent  (Gave rehab list and sent [facility])     SW met with Pt wife at bedside. Discussed therapy recs for rehab and provided list. Pt wife reported she wants [facility] as first choice and will review list for additional choices asap.    SARAH sent referral via .    Melina Virgen LCSW  Neurocritical Care   [facility]  [id number]

## 2019-12-04 NOTE — PT/OT/SLP PROGRESS
Speech Language Pathology Treatment    Patient Name:  Jermaine Linda   MRN:  [id number]  Admitting Diagnosis: SDH (subdural hematoma)    Recommendations:                 General Recommendations:  Dysphagia therapy, Speech/language therapy and Cognitive-linguistic therapy  Diet recommendations:  Puree, Liquid Diet Level: Thin   Aspiration Precautions: 1 bite/sip at a time, Assistance with meals, Meds crushed in puree and Standard aspiration precautions   General Precautions: Standard,      Subjective     Pt awake; son at the bedside      Pain/Comfort:  · Pain Rating 1: 0/10  · Pain Rating Post-Intervention 1: 0/10    Objective:     Has the patient been evaluated by SLP for swallowing?   Yes  Keep patient NPO? No   Current Respiratory Status:    Room air    Pt remains with NG tube in place and diet not yet advanced. Per RN pt re-started on sedation meds and more lethargic compared to previous assessment. No PO trials offered this date give pt lethargy. SLP discussed with RN and family member at the bedside diet recommendations, precautions, and safe swallow strategies. Son demonstrated understanding and agreement. Whiteboard current with diet recs.     Pt oriented to self only. SLP reviewed with pt basic orientation information including location, date and situation. With binary choice pt able to select appropriate orientation information 70% of the time. Son at bedside reporting pt typically more accurate when more awake and alert. Pt completed autospeech tasks with 40% acc with SLP mod cues. Pt performance with auto speech tasks appearing mostly limited by decreased attention warranting frequent redirection to task to complete. Pt also easily distracted by environmental stimuli. SLP discussed with son how to establish low stimulation environment. SLP discussed with son activities to complete outside of skilled speech therapy sessions. Son engaged in conversation and asking appropriate questions. SLP discussed with  team at bedside need for new OT and PT orders as well as diet advancement. All parties demonstrating understanding and agreement with plan. Speech to continue to follow.     Assessment:     Jermaine Linda is a 64 y.o. male with an SLP diagnosis of Dysphagia and Cognitive-Linguistic Impairment.      Goals:   Multidisciplinary Problems     SLP Goals        Problem: SLP Goal    Goal Priority Disciplines Outcome   SLP Goal     SLP Ongoing, Progressing   Description:  Speech Language Pathology Goals  Goals expected to be met by 12/10    1. Pt will tolerate diet of thin liquids and purees without overt clinical signs of aspiration   2. Pt will participate in trials of soft solids within speech therapy sessions to help determine least restrictive diet   3. Pt will demonstrate orientation to self, place, situation , family members, date w/ min cues   4. Pt will complete problem solving skills w/ 75 % acc to enhance safety awareness   5. Pt will generate 5 items per category to enhance organizations skills   6. Pt will follow single step directions w/ 80% acc w/ min cues to enhance comprehension skills   7.  Pt will participate in ongoing assessment of speech language and cognitive linguistic skills to help rule out deficits and determine therapeutic plan of care                         Plan:     · Patient to be seen:  4 x/week   · Plan of Care expires:     · Plan of Care reviewed with:  patient   · SLP Follow-Up:  Yes       Discharge recommendations:  rehabilitation facility   Barriers to Discharge:  None    Time Tracking:     SLP Treatment Date:   12/04/19  Speech Start Time:  0902  Speech Stop Time:  0918     Speech Total Time (min):  16 min    Billable Minutes: Treatment Swallowing Dysfunction 8 and Seld Care/Home Management Training 8    Keshia Keys CCC-SLP  12/04/2019

## 2019-12-04 NOTE — PLAN OF CARE
POC reviewed with pt and son at 0530. Pt and son verbalized understanding. Questions and concerns addressed. Patient very restless and agitated throughout shift. IV Diazepam given x1, Hydralazine given x1 for SBP >180, Precedex restarted, PRN pain medication given for HA & back pain, Straight cath done Q4, NS @ 75, Electrolyte replacement started,  Pt progressing. Will continue to monitor. See flowsheets for full assessment and VS info.

## 2019-12-04 NOTE — PROGRESS NOTES
[facility]  Neurocritical Care  Progress Note    Admit Date: 12/1/2019  Service Date: 12/04/2019  Length of Stay: 3    Subjective:     Chief Complaint: SDH (subdural hematoma)    History of Present Illness: Mr. Linda is a 64 year old male with a PMHx of HTN, HLD, DM2, thyroid disease, anxiety, toe amputation left, and hx of prostate cancer presents as a transfer from [facility] to Neuro Critical Care s/p SDH after mechanical fall +blood alcohol level 216 and getting out of car and hitting head against concrete.  CT Head reveals acute subdural hemorrhage along the right cerebral convexity with worsening mass effect and new leftward midline shift of approximately 12 mm and partial effacement of the basal cisterns. On examination, patient is awake, alert, oriented X 2, follows simple commands, left sided weakness, and left facial droop. No reports of blood thinners.Patient will be Class A to the OR with NGSY. Patient will be admitted to Neuro Critical Care for a higher level of care.     Hospital Course: 12/1 admit to [facility] s/p SDH after mechanical fall +blood alcohol level 216 and getting out of car and hitting head against concrete  12/2: attempted to wean propofol-pt very agitated, continue continue scheduled diazepam 5mg TID, SBP <160, add metoprolol BID,CXR, bisacodyl,SBT and nutrition consult.   12/3: Agitated overnight, Precedex gtt added to Prop. DC'd this AM for extubation. Subgaleal drain removed this AM. Will obtain CTH post drain pull. Continues on TID Valium for withdrawal.   12/4: Agitated overnight, Precedex gtt restarted, off this AM. Poor sleep overnight, drowsy this AM. Post drain pull CT reviewed. Delirium precautions/qhs Seroquel added today w/PRNs available. Continuing on TF until agitation resolves before starting PO. Starting Amantadine today as well as Escudero placement/Silodosin.     Interval History: Agitated overnight, Precedex gtt restarted, off this AM. Poor sleep  overnight, drowsy this AM. Post drain pull CT reviewed. Delirium precautions/qhs Seroquel added today w/PRNs available. Continuing on TF until agitation resolves before starting PO. Starting Amantadine today as well as Escudero placement/Silodosin.     Review of Systems   Unable to obtain a complete ROS due to aphasia  Objective:     Vitals:  Temp: 98.8 °F (37.1 °C)  Pulse: 76  Rhythm: normal sinus rhythm  BP: 119/61  MAP (mmHg): 84  Resp: 20  SpO2: 95 %  Oxygen Concentration (%): 32  O2 Device (Oxygen Therapy): nasal cannula    Temp  Min: 98.2 °F (36.8 °C)  Max: 99.1 °F (37.3 °C)  Pulse  Min: 75  Max: 107  BP  Min: 110/57  Max: 185/74  MAP (mmHg)  Min: 78  Max: 113  Resp  Min: 12  Max: 27  SpO2  Min: 94 %  Max: 100 %  Oxygen Concentration (%)  Min: 32  Max: 40    12/03 0701 - 12/04 0700  In: 2685.9 [I.V.:2130.9]  Out: 6085 [Urine:6000; Drains:85]   Unmeasured Output  Urine Occurrence: 1  Stool Occurrence: 0  Pad Count: 3       Physical Exam  GA: comfortable, no acute distress.   HEENT: No scleral icterus or JVD.   Pulmonary: intubated, mechanical breath sounds  Cardiac: RRR   Abdominal: Abdomen soft  Skin: No jaundice, rashes, or visible lesions.  Neuro:  --GCS: E4 V4 M6  --Mental Status: repeats name, cannot answer orientation questions  --Pupils 4mm, PERRL.   --LIPSCOMB spont, follows some commands    Medications:  Continuous  dexmedetomidine (PRECEDEX) infusion Last Rate: Stopped (12/04/19 0915)   Scheduled  amantadine HCl 100 mg BID   amlodipine-benazepril 5-10 mg 1 capsule Daily   bisacodyl 10 mg Daily   heparin (porcine) 5,000 Units Q8H   levetiracetam IVPB 1,000 mg Q12H   levothyroxine 100 mcg Before breakfast   metoprolol tartrate 25 mg BID   mupirocin 1 g BID   polyethylene glycol 17 g Daily   QUEtiapine 25 mg QHS   silodosin 4 mg Daily   thiamine (VITAMIN B1) IVPB 100 mg Daily   PRN  acetaminophen 650 mg Q6H PRN   acetaminophen 650 mg Q6H PRN   albuterol-ipratropium 3 mL Q6H PRN   clonazePAM 1 mg BID PRN    Dextrose 10% Bolus 12.5 g PRN   glucagon (human recombinant) 1 mg PRN   hydrALAZINE 10 mg Q4H PRN   HYDROcodone-acetaminophen 2 tablet Q4H PRN   insulin aspart U-100 1-10 Units Q6H PRN   labetalol 10 mg Q4H PRN   magnesium oxide 800 mg PRN   magnesium oxide 800 mg PRN   OLANZapine 2.5 mg BID PRN   ondansetron 4 mg Q8H PRN   potassium chloride 10% 40 mEq PRN   potassium chloride 10% 40 mEq PRN   potassium chloride 10% 60 mEq PRN   potassium, sodium phosphates 2 packet PRN   potassium, sodium phosphates 2 packet PRN   potassium, sodium phosphates 2 packet PRN   sodium chloride 0.9% 10 mL PRN     Today I personally reviewed pertinent imaging, notably:  X-ray Chest 1 View    Result Date: 12/4/2019  Interval removal of endotracheal tube, support hardware and intrathoracic findings otherwise stable to yesterday's study as noted. Electronically signed by: Mani Talbot Date:    12/04/2019 Time:    07:47    Ct Head Without Contrast    Result Date: 12/3/2019  Evolving operative change from right frontotemporal parietal craniotomy and subdural evacuation. There is thin residual extra-axial collection overlying the right cerebral convexity compatible with postoperative gas and residual hemorrhage.  In addition there is small volume scattered subarachnoid hemorrhage most pronounced in the right frontal and parietal sulci.  No definite new hemorrhage. Interval development of small sized heterogeneous mixed attenuation in the right temporal lobe concerning for small volume hemorrhagic contusion.  Clinical correlation and follow-up recommended.  No significant midline shift or evidence for hydrocephalus. Clinical correlation and continued follow-up advised Electronically signed by: Alan Nuñez DO Date:    12/03/2019 Time:    15:57      Diet  Diet NPO  Diet NPO    TF at goal, FWF added.       Assessment/Plan:     Neuro  * SDH (subdural hematoma)  Right Traumatic SDH   --Continue Neuro checks q 1hr  -- 12/2: POD#3 s/p right  craniotomy for SDH evacuation (12/1)  -- off cEEG, no seizure activity  -- Subgaleal drain removed, post pull CTH appropriate  -- SBP <160  -- continue valium 5 mg TID-for agitation  -- Start Amantadine BID  -- add Sertraline qhs and PRN Zyprexa for agitation  -- repeat EKG tomorrow for QTC        Seizure prophylaxis  -off cEEG  -keppra 1gm BID    Cardiac/Vascular  Hyperlipidemia  --lipid panel complete  -- Atorvastatin 80 mg daily     Essential hypertension  Hypertension  -- Continue to monitor HR and BP   --SBP goal < 160  · -- 2D echo-Concentric left ventricular remodeling.  · Normal left ventricular systolic function. The estimated ejection fraction is 60%  · Normal right ventricular systolic function.  · Normal LV diastolic function.  No wall motion abnormalities  --Continue home med amlodipine-benazepril 5-10 daily      Renal/  Urinary retention  H/o prostate cancer  H/o retention per family  -- silodosin  -- wilson to be placed    Hypovolemia  -resolved, TF at goal, DC IVF    Endocrine  Hypothyroidism  -TSH 1.114  - Continue levothyroxine 100 mcg daily     Diabetes mellitus  -- IlubI6w 6.3  -- SSI   -- Glucerna TF with  q6h  -- ok for Puree/thin diet once agitation/alertness improves    GI  Transaminase or LDH elevation  -trending down     Orthopedic  Non-traumatic rhabdomyolysis  --CK daily          The patient is being Prophylaxed for:  Venous Thromboembolism with: Mechanical or Chemical  Stress Ulcer with: Not Applicable   Ventilator Pneumonia with: not applicable    Activity Orders          Straight Cath starting at 12/03 1300    Diet NPO: NPO starting at 12/01 8076        Full Code    Adrienne Gooden MD  Neurocritical Care  [facility]

## 2019-12-04 NOTE — PROGRESS NOTES
[facility]  Neurosurgery  Progress Note    Subjective:     History of Present Illness: 64yom w pmh of HTN, HLD, DM2, thyroid disease, anxiety, and hx of prostate cancer presents after mechanical fall getting out of car and hitting head against concrete found at [facility] ED to have R SDH. pt transferred to [facility] and repeat CTH showed worsening SDH with icreased mass effect, and decline in mental status in ED.    Post-Op Info:  Procedure(s) (LRB):  Right CRANIOTOMY, FOR SUBDURAL HEMATOMA EVACUATION  (Right)   3 Days Post-Op     Interval History: naeon    Medications:  Continuous Infusions:   sodium chloride 0.9% 75 mL/hr at 12/04/19 0805    dexmedetomidine (PRECEDEX) infusion Stopped (12/04/19 0915)     Scheduled Meds:   albuterol-ipratropium  3 mL Nebulization Q4H WAKE    amlodipine-benazepril 5-10 mg  1 capsule Per NG tube Daily    bisacodyl  10 mg Rectal Daily    chlorhexidine  15 mL Mouth/Throat BID    diazePAM  5 mg Per NG tube Q8H    heparin (porcine)  5,000 Units Subcutaneous Q8H    levetiracetam IVPB  1,000 mg Intravenous Q12H    levothyroxine  100 mcg Per NG tube Before breakfast    metoprolol tartrate  25 mg Per NG tube BID    mupirocin  1 g Nasal BID    pantoprazole  40 mg Per NG tube Daily    polyethylene glycol  17 g Per NG tube Daily    thiamine (VITAMIN B1) IVPB  100 mg Intravenous Daily     PRN Meds:acetaminophen, acetaminophen, clonazePAM, Dextrose 10% Bolus, glucagon (human recombinant), hydrALAZINE, HYDROcodone-acetaminophen, insulin aspart U-100, labetalol, magnesium oxide, magnesium oxide, ondansetron, potassium chloride 10%, potassium chloride 10%, potassium chloride 10%, potassium, sodium phosphates, potassium, sodium phosphates, potassium, sodium phosphates, sodium chloride 0.9%     Review of Systems  Objective:     Weight: 102.1 kg (225 lb)  Body mass index is 30.52 kg/m².  Vital Signs (Most Recent):  Temp: 98.8 °F (37.1 °C) (12/04/19 0705)  Pulse: 89  (12/04/19 0905)  Resp: 20 (12/04/19 0905)  BP: (!) 161/74 (12/04/19 0905)  SpO2: 99 % (12/04/19 0905) Vital Signs (24h Range):  Temp:  [98.2 °F (36.8 °C)-99.1 °F (37.3 °C)] 98.8 °F (37.1 °C)  Pulse:  [] 89  Resp:  [12-27] 20  SpO2:  [94 %-100 %] 99 %  BP: (110-185)/(57-84) 161/74  Arterial Line BP: ()/() 183/73     Date 12/04/19 0700 - 12/05/19 0659   Shift 2717-7823 4921-8242 4686-6252 24 Hour Total   INTAKE   I.V.(mL/kg) 167.3(1.6)   167.3(1.6)   NG/GT 20   20   Shift Total(mL/kg) 187.3(1.8)   187.3(1.8)   OUTPUT   Urine(mL/kg/hr) 300   300   Shift Total(mL/kg) 300(2.9)   300(2.9)   Weight (kg) 102.1 102.1 102.1 102.1              Vent Mode: Spont  Oxygen Concentration (%):  [32-40] 32  Resp Rate Total:  [11 br/min-21 br/min] 11 br/min  PEEP/CPAP:  [8 cmH20] 8 cmH20  Pressure Support:  [10 cmH20] 10 cmH20  Mean Airway Pressure:  [11 cmH20] 11 cmH20         NG/OG Tube 12/02/19 0001 Left nostril (Active)   Placement Check placement verified by distal tube length measurement;placement verified by x-ray 12/4/2019  3:02 AM   Advancement advanced manually 12/3/2019  3:02 PM   Tolerance no signs/symptoms of discomfort 12/4/2019  3:02 AM   Securement secured to nostril center w/ adhesive device 12/4/2019  3:02 AM   Clamp Status/Tolerance clamped 12/4/2019  3:02 AM   Suction Setting/Drainage Method dependent drainage 12/3/2019  3:02 PM   Insertion Site Appearance no redness, warmth, tenderness, skin breakdown, drainage 12/4/2019  3:02 AM   Drainage None 12/4/2019  3:02 AM   Flush/Irrigation flushed w/;water;no resistance met 12/4/2019  3:02 AM   Feeding Method continuous 12/4/2019  3:02 AM   Feeding Action feeding held 12/4/2019  3:02 AM   Current Rate (mL/hr) 0 mL/hr 12/4/2019  3:02 AM   Goal Rate (mL/hr) 65 mL/hr 12/4/2019  3:02 AM   Intake (mL) 0 mL 12/4/2019  6:02 AM   Water Bolus (mL) 0 mL 12/4/2019  6:02 AM   Rate Formula Tube Feeding (mL/hr) 65 mL/hr 12/4/2019  3:02 AM   Formula Name Glucerna  12/4/2019  3:02 AM   Intake (mL) - Formula Tube Feeding 10 12/4/2019  8:05 AM   Residual Amount (ml) 0 ml 12/4/2019  3:02 AM       Neurosurgery Physical Exam   E4V4M6  PERRL  FCX4    Significant Labs:  Recent Labs   Lab 12/03/19  0130 12/03/19  1821 12/04/19 0221   *  --  122*     --  140   K 3.7 4.0 3.6     --  107   CO2 26  --  23   BUN 7*  --  6*   CREATININE 0.7  --  0.7   CALCIUM 7.8*  --  8.6*   MG 2.1  --  1.9     Recent Labs   Lab 12/03/19  0130 12/04/19 0221   WBC 8.48 7.83   HGB 8.3* 9.1*   HCT 25.7* 27.9*    208     No results for input(s): LABPT, INR, APTT in the last 48 hours.  Microbiology Results (last 7 days)     Procedure Component Value Units Date/Time    Culture, Respiratory with Gram Stain [[id number]]  (Abnormal) Collected:  12/02/19 0825    Order Status:  Completed Specimen:  Respiratory from Tracheal Aspirate Updated:  12/03/19 1317     Respiratory Culture SERRATIA MARCESCENS  Many  Susceptibility pending       Gram Stain (Respiratory) <10 epithelial cells per low power field.     Gram Stain (Respiratory) No WBC's     Gram Stain (Respiratory) Moderate Gram negative rods    Blood culture [[id number]] Collected:  12/02/19 0824    Order Status:  Completed Specimen:  Blood from Peripheral, Hand, Right Updated:  12/03/19 1212     Blood Culture, Routine No Growth to date      No Growth to date            Significant Diagnostics:  CT head: reviewed, stable post drain removal.    Assessment/Plan:     * SDH (subdural hematoma)  64yom w pmh of HTN, HLD, DM2, thyroid disease, anxiety, and hx of prostate cancer presents after mechanical fall getting out of car and [facility] head [facility]. [place] [facility] [place] showed worsening SDH with icreased mass effect, and decline in mental status in ED. Now s/p right craniotomy for SDH evacuation (12/1):    - Continue ICU care  - Q1hr neurochecks  - Follow-up final EEG.  Continue AED  per ICU  - HOB>30  - Na>135  - SBP<140  - SQH  - Daily PTOT  - Further care per ICU team  - Will continue to follow closely          Frank Rogers,   Neurosurgery  [facility]

## 2019-12-04 NOTE — PLAN OF CARE
Pt with good progress towards goals.     Keshia Keys MS, CCC-SLP  Speech Language Pathologist  Pager: [phone]  Date 12/4/2019

## 2019-12-04 NOTE — SUBJECTIVE & OBJECTIVE
Interval History: Agitated overnight, Precedex gtt restarted, off this AM. Poor sleep overnight, drowsy this AM. Post drain pull CT reviewed. Delirium precautions/qhs Seroquel added today w/PRNs available. Continuing on TF until agitation resolves before starting PO. Starting Amantadine today as well as Escudero placement/Silodosin.     Review of Systems   Unable to obtain a complete ROS due to aphasia  Objective:     Vitals:  Temp: 98.8 °F (37.1 °C)  Pulse: 76  Rhythm: normal sinus rhythm  BP: 119/61  MAP (mmHg): 84  Resp: 20  SpO2: 95 %  Oxygen Concentration (%): 32  O2 Device (Oxygen Therapy): nasal cannula    Temp  Min: 98.2 °F (36.8 °C)  Max: 99.1 °F (37.3 °C)  Pulse  Min: 75  Max: 107  BP  Min: 110/57  Max: 185/74  MAP (mmHg)  Min: 78  Max: 113  Resp  Min: 12  Max: 27  SpO2  Min: 94 %  Max: 100 %  Oxygen Concentration (%)  Min: 32  Max: 40    12/03 0701 - 12/04 0700  In: 2685.9 [I.V.:2130.9]  Out: 6085 [Urine:6000; Drains:85]   Unmeasured Output  Urine Occurrence: 1  Stool Occurrence: 0  Pad Count: 3       Physical Exam  GA: comfortable, no acute distress.   HEENT: No scleral icterus or JVD.   Pulmonary: intubated, mechanical breath sounds  Cardiac: RRR   Abdominal: Abdomen soft  Skin: No jaundice, rashes, or visible lesions.  Neuro:  --GCS: E4 V4 M6  --Mental Status: repeats name, cannot answer orientation questions  --Pupils 4mm, PERRL.   --LIPSCOMB spont, follows some commands    Medications:  Continuous  dexmedetomidine (PRECEDEX) infusion Last Rate: Stopped (12/04/19 0915)   Scheduled  amantadine HCl 100 mg BID   amlodipine-benazepril 5-10 mg 1 capsule Daily   bisacodyl 10 mg Daily   heparin (porcine) 5,000 Units Q8H   levetiracetam IVPB 1,000 mg Q12H   levothyroxine 100 mcg Before breakfast   metoprolol tartrate 25 mg BID   mupirocin 1 g BID   polyethylene glycol 17 g Daily   QUEtiapine 25 mg QHS   silodosin 4 mg Daily   thiamine (VITAMIN B1) IVPB 100 mg Daily   PRN  acetaminophen 650 mg Q6H PRN   acetaminophen  650 mg Q6H PRN   albuterol-ipratropium 3 mL Q6H PRN   clonazePAM 1 mg BID PRN   Dextrose 10% Bolus 12.5 g PRN   glucagon (human recombinant) 1 mg PRN   hydrALAZINE 10 mg Q4H PRN   HYDROcodone-acetaminophen 2 tablet Q4H PRN   insulin aspart U-100 1-10 Units Q6H PRN   labetalol 10 mg Q4H PRN   magnesium oxide 800 mg PRN   magnesium oxide 800 mg PRN   OLANZapine 2.5 mg BID PRN   ondansetron 4 mg Q8H PRN   potassium chloride 10% 40 mEq PRN   potassium chloride 10% 40 mEq PRN   potassium chloride 10% 60 mEq PRN   potassium, sodium phosphates 2 packet PRN   potassium, sodium phosphates 2 packet PRN   potassium, sodium phosphates 2 packet PRN   sodium chloride 0.9% 10 mL PRN     Today I personally reviewed pertinent imaging, notably:  X-ray Chest 1 View    Result Date: 12/4/2019  Interval removal of endotracheal tube, support hardware and intrathoracic findings otherwise stable to yesterday's study as noted. Electronically signed by: Mani Talbot Date:    12/04/2019 Time:    07:47    Ct Head Without Contrast    Result Date: 12/3/2019  Evolving operative change from right frontotemporal parietal craniotomy and subdural evacuation. There is thin residual extra-axial collection overlying the right cerebral convexity compatible with postoperative gas and residual hemorrhage.  In addition there is small volume scattered subarachnoid hemorrhage most pronounced in the right frontal and parietal sulci.  No definite new hemorrhage. Interval development of small sized heterogeneous mixed attenuation in the right temporal lobe concerning for small volume hemorrhagic contusion.  Clinical correlation and follow-up recommended.  No significant midline shift or evidence for hydrocephalus. Clinical correlation and continued follow-up advised Electronically signed by: Alan Nuñez DO Date:    12/03/2019 Time:    15:57      Diet  Diet NPO  Diet NPO    TF at goal, FWF added.

## 2019-12-04 NOTE — PT/OT/SLP EVAL
Occupational Therapy   Evaluation    Name: Jermaine Linda  MRN: [id number]  Admitting Diagnosis:  SDH (subdural hematoma) 3 Days Post-Op    Recommendations:     Discharge Recommendations: rehabilitation facility  Discharge Equipment Recommendations:  (TBD with progression in care; at next level of care)  Barriers to discharge:  Other (Comment)(remains in ICU; level of skilled assistance required)    Assessment:     Jermaine Linda is a 64 y.o. male with a medical diagnosis of SDH (subdural hematoma) s/p fall on concrete.  He presents with 3 days post op at this time; remains in ICU. He demo mild L hemiparesis. He demo cognitive deficits and difficulty with sustained attention for functional task on this date. rec rehab at this time pending further progression in care.  Performance deficits affecting function: weakness, impaired endurance, impaired self care skills, impaired functional mobilty, gait instability, impaired balance, pain, decreased upper extremity function, decreased lower extremity function, impaired cardiopulmonary response to activity, impaired skin.      Rehab Prognosis: Good; patient would benefit from acute skilled OT services to address these deficits and reach maximum level of function.       Plan:     Patient to be seen 4 x/week to address the above listed problems via self-care/home management, therapeutic activities, therapeutic exercises, neuromuscular re-education, cognitive retraining  · Plan of Care Expires: 01/03/20  · Plan of Care Reviewed with: patient, spouse, daughter    Subjective     Chief Complaint: did not report  Patient/Family Comments/goals: to get better    Occupational Profile: info per spouse at bedside  Living Environment: Pt lives with spouse, daughter and 2 grandchildren in [facility] with 0 SMITH. Tub/shower combo.   Previous level of function: active and indep. Driving. Retired milk deliveryman. Wears glasses for items close up. Enjoys playing and spending time with young  grand-daughters (playing school). Has temperpedic bed for LB pain  Roles and Routines: , father, grandfather, care taker to self, home and community dweller  Equipment Used at Home:  none  Assistance upon Discharge: yes; good family support    Pain/Comfort: premedicated for pain   · Pain Rating 1: 0/10  · Pain Rating Post-Intervention 1: 0/10    Patients cultural, spiritual, Spiritism conflicts given the current situation: yes(Judaism)    Objective:     Communicated with: RN prior to session.  Patient found HOB elevated with arterial line, blood pressure cuff, wilson catheter, telemetry, SCD, pulse ox (continuous), restraints, peripheral IV, NG tube upon OT entry to room.    General Precautions: Standard, aspiration, fall, diabetic, seizure, pureed diet   Orthopedic Precautions:N/A   Braces: N/A     Occupational Performance:    Bed Mobility:    · Patient completed Rolling/Turning to Right with moderate assistance and with side rail  · Patient completed Supine to Sit with moderate assistance  · Patient completed Sit to Supine with moderate assistance    Functional Mobility/Transfers:  · Patient completed Sit <> Stand Transfer with minimum assistance and of 2 persons  with  no assistive device   · Functional Mobility: side steps to R at EOB with min(A) x2 persons    Activities of Daily Living:  · Feeding:  NG present    · Grooming: maximal assistance seated EOB  · Upper Body Dressing: maximal assistance EOB  · Lower Body Dressing: total assistance bed level to don B socks    Cognitive/Visual Perceptual:  Cognitive/Psychosocial Skills:     -       Oriented to: Person and Place   -       Follows Commands/attention:inconsistent  -       Communication: delayed  -       Memory: Impaired STM and Poor immediate recall   Intact: longterm: naming wife, daughter, grand-daughter  -       Safety awareness/insight to disability: impaired   -       Mood/Affect/Coping skills/emotional control: Drowsy  Visual/Perceptual:       -Intact  saccades      Physical Exam:  Postural examination/scapula alignment:    -       Rounded shoulders  -       Forward head  -       Posterior pelvic tilt  Skin integrity: Bruising of posterior head  Edema:  Moderate head  Motor Planning:    -       Delayed on L  Dominant hand:    -       R  Upper Extremity Range of Motion:     -       Right Upper Extremity: WFL  -       Left Upper Extremity: WFL  Upper Extremity Strength:    -       Right Upper Extremity: WFL as observed with functional task  -       Left Upper Extremity: unable to complete formal MMT; noted weakness   Strength:    -       Right Upper Extremity: WFL  -       Left Upper Extremity: 3/5  Gross and Fine Motor Coordination: unable to complete 2/2 mentation     AMPAC 6 Click ADL:  AMPA Total Score: 11   Body mass index is 30.52 kg/m².  Vitals:    12/04/19 1405   BP: 129/60   Pulse: 78   Resp: 16   Temp:        Treatment & Education:  -Pt alert with eyes open 50% of session; requiring cues for sustained alertness throughout  -EOB with CGA for static sitting; min(A) with increased dynamic task  -static standing with min(A)x2 persons for best safety  -edu pt/family on OT role in care and POC for acute setting  -edu on elimination of external auditory input and providing time for answers  -return to supine in upright position; B UE in elevation and extension with abduction   -no bedside commode on unit/stepdown; called in order to Central supply; discussed t/f and safety with RN following  Education:    Patient left HOB elevated with all lines intact, call button in reach, bed alarm on, RN notified and family and  present    GOALS:   Multidisciplinary Problems     Occupational Therapy Goals        Problem: Occupational Therapy Goal    Goal Priority Disciplines Outcome Interventions   Occupational Therapy Goal     OT, PT/OT Ongoing, Progressing    Description:  Goals to be met by: 12/18     Patient will increase functional independence with  ADLs by performing:    Pt will report daily orientation x4 with added time and 0 added cues.  Supine<>sit with CGA and HOB flat.  UE Dressing while seated EOB with Minimal Assistance.  LB Dressing (donning pull up brief) with Minimal Assistance.   Grooming while standing with Minimal Assistance.  Toileting from bedside commode with Minimal Assistance for hygiene and clothing management.   Toilet transfer to bedside commode with Minimal Assistance.                      History:     Past Medical History:   Diagnosis Date    Anxiety     Cancer     Diabetes mellitus     Hypertension        Past Surgical History:   Procedure Laterality Date    CRANIOTOMY FOR EVACUATION OF SUBDURAL HEMATOMA Right 12/1/2019    Procedure: Right CRANIOTOMY, FOR SUBDURAL HEMATOMA EVACUATION ;  Surgeon: Niles Toledo DO;  Location: [facility] OR [address] [place] [address];  Service: Neurosurgery;  Laterality: Right;       Time Tracking:     OT Date of Treatment: 12/04/19  OT Start Time: 1330  OT Stop Time: 1403  OT Total Time (min): 33 min    Billable Minutes:Evaluation 20  Therapeutic Activity 8    GENA Huston  12/4/2019

## 2019-12-04 NOTE — PT/OT/SLP EVAL
Physical Therapy Evaluation    Patient Name:  Jermaine Linda   MRN:  [id number]    Recommendations:     Discharge Recommendations:  rehabilitation facility   Discharge Equipment Recommendations: (TBD with progression in care; at next level of care)   Barriers to discharge: Inaccessible home, Decreased caregiver support and patient below functional baseline    Assessment:     Jermaine Linda is a 64 y.o. male admitted with a medical diagnosis of SDH (subdural hematoma).  He presents with the following impairments/functional limitations:  weakness, impaired endurance, gait instability, impaired functional mobilty, impaired cognition, impaired self care skills, impaired balance, decreased upper extremity function, decreased lower extremity function, edema, decreased safety awareness, impaired cardiopulmonary response to activity. The patient presents with impairments in attention and arousal due to diagnosis of TBI. He demonstrates left sided weakness and inattention. He requires significant manual and verbal cuing to perform functional mobility. He stood with minimum assistance and took several sidesteps to R with minimum assistance of 2 people and hand held assist. He is safe to transfer to bedside commode with RN assist of 2 people.  Based on the patient's progress with therapy, motivation to participate in treatment, and prior level of independence, they are an excellent candidate for inpatient rehabilitation and they would benefit from rehab to maximize their functional potential.      Rehab Prognosis: Good; patient would benefit from acute skilled PT services to address these deficits and reach maximum level of function.    Recent Surgery: Procedure(s) (LRB):  Right CRANIOTOMY, FOR SUBDURAL HEMATOMA EVACUATION  (Right) 3 Days Post-Op    Plan:     During this hospitalization, patient to be seen 4 x/week to address the identified rehab impairments via gait training, therapeutic activities, therapeutic exercises,  "neuromuscular re-education and progress toward the following goals:    · Plan of Care Expires:  20    Subjective     Chief Complaint: "I need to go to the bathroom", unable to locate BSC, OT ordered BSC and RN alerted that patient is safe to transfer with RN assist x2 people  Patient/Family Comments/goals: mobilize with therapy, get out of bed  Pain/Comfort:  · Pain Rating 1: 0/10  · Pain Addressed 1: Pre-medicate for activity    Patients cultural, spiritual, Methodist conflicts given the current situation: no    Living Environment:  Per the patient's wife, he lives with his wife, daughter, and two grandchildren in a [facility] with 0 SMITH; tub-shower. He is a retired milk deliveryman. He enjoys spending time with his granddaughters.  Prior to admission, patients level of function was independent, no AD.  Equipment used at home: none.  DME owned (not currently used): none.  Upon discharge, patient will have assistance from family.    Objective:     Communicated with RN prior to session.  Patient found HOB elevated with arterial line, bed alarm, blood pressure cuff, restraints, SCD, peripheral IV, pulse ox (continuous), wilson catheter, NG tube  upon PT entry to room.    General Precautions: Standard, aspiration, fall, diabetic, seizure, pureed diet   Orthopedic Precautions:N/A   Braces: N/A     Exams:    Cognitive Exam  Patient is A&O x self, location ("[facility]"), able to name family in room; unable to state month, year or his  and follows 50% of one -step commands- requires multiple manual/verbal/visual cues, increased time to complete command, decreased stimulation due to impaired attention. Eyes open 50% of treatment with cuing.    Fine Motor Coordination   -       Unable to fully assess due to impaired command following, impaired L LE   Postural Exam Patient presented with the following abnormalities:    -       Rounded shoulders  -       Forward head  -       Kyphosis  -       Posterior pelvic tilt "   Sensation    -       Light touch unable to assess due to impaired attention and lethargy   Skin Integrity/Edema     -       Skin integrity: stitches on scalp clean and dry  -       Edema: swelling of face, mild swelling in hands   R LE ROM WFL   R LE Strength 3-/5 hip flexion, 3+/5 knee ext/flex, and ankle DF/PF; limited ability to follow commands for MMT   L LE ROM WFL   L LE Strength  2+/5 hip flexion, 3-/5 knee ext/flex, and 0/5 ankle DF/PF; limited ability to follow commands for MMT     Balance          Static Sitting stand by assistance to contact guard assist    Dynamic Sitting contact guard assist to minimum assistance   Static Standing minimum assistance to contact guard assist with hand held assist, LE braced against bed   Dynamic Standing minimum assistance, LE braced against bed                Functional Mobility:    Bed Mobility  Rolling to R: moderate assistance, verbal/manual cues for sequencing  Supine to Sit:  moderate assistance, assist at trunk and lower extremities, cues for sequencing   Transfers Sit to Stand:  moderate assistance, 2 rep   Gait  Gait Distance: ~8 sidesteps with hand held assist  Assistance Level: minimum assistance  Description: wide DAVID, short shuffling steps, impaired motor planning, impaired weight shift, flexed at hips/trunk. Manual and verbal cues for sequencing, facilitation for weight shift.           Therapeutic Activities and Exercises:   Patient and family educated on role of therapy, goals of session, benefits of out of bed mobility. Patient agreeable to mobilize with therapy.  Discussed PT plan of care during hospitalization. Patient educated that they need to call for assistance to mobilize out of bed. Whiteboard updated as appropriate. Patient educated on how their diagnosis impacts their mobility within PT scope of practice.  Patient sat edge of bed 10 minutes with good postural control and contact guard assist to stand by assistance, requiring increased assist  with dynamic tasks and with fatigue.  Educated patient's family on importance of decreasing verbal/visual stimulation especially when communicating with patient, importance of speaking slowly and clearly and allowing patient time to respond. They expressed understanding.   Positioned patient in neutral alignment, bilateral upper extremities abducted and elevated, heels floating.      AM-PAC 6 CLICK MOBILITY  Total Score:14     Patient left HOB elevated with all lines intact, call button in reach, bed alarm on and restraints reapplied at end of session.    GOALS:   Multidisciplinary Problems     Physical Therapy Goals        Problem: Physical Therapy Goal    Goal Priority Disciplines Outcome Goal Variances Interventions   Physical Therapy Goal     PT, PT/OT Ongoing, Progressing     Description:  Goals to be met by: 2019     Patient will increase functional independence with mobility by performin. Supine to sit with Contact Guard Assistance  2. Sit to supine with Contact Guard Assistance  3. Sit to stand transfer with Stand-by Assistance  4. Bed to chair transfer with Minimal Assistance using least restrictive device or no AD.   5. Gait  x 40 feet with Contact Guard Assistance using least restrictive device or no AD.   6. Stand for 3 minutes with Stand-by Assistance using  No AD to improve static standing balance and prepare for functional activities in standing.   7. Lower extremity exercise program x20 reps per handout, with independence to improve strength and activity tolerance.                      History:     Past Medical History:   Diagnosis Date    Anxiety     Cancer     Diabetes mellitus     Hypertension        Past Surgical History:   Procedure Laterality Date    CRANIOTOMY FOR EVACUATION OF SUBDURAL HEMATOMA Right 2019    Procedure: Right CRANIOTOMY, FOR SUBDURAL HEMATOMA EVACUATION ;  Surgeon: Niles Toledo DO;  Location: [facility] OR [address] [place] [address];  Service: Neurosurgery;  Laterality:  Right;       Time Tracking:     PT Received On: 12/04/19  PT Start Time: 1330     PT Stop Time: 1402  PT Total Time (min): 32 min     Billable Minutes: Evaluation 17 and Therapeutic Activity 8 (co-eval with OT)      Monika Ulloa, PT  12/04/2019

## 2019-12-05 LAB
ALBUMIN SERPL BCP-MCNC: 3 G/DL (ref 3.5–5.2)
ALP SERPL-CCNC: 83 U/L (ref 55–135)
ALT SERPL W/O P-5'-P-CCNC: 35 U/L (ref 10–44)
ANION GAP SERPL CALC-SCNC: 9 MMOL/L (ref 8–16)
AST SERPL-CCNC: 54 U/L (ref 10–40)
BASOPHILS # BLD AUTO: 0.02 K/UL (ref 0–0.2)
BASOPHILS NFR BLD: 0.2 % (ref 0–1.9)
BILIRUB SERPL-MCNC: 0.4 MG/DL (ref 0.1–1)
BUN SERPL-MCNC: 10 MG/DL (ref 8–23)
CALCIUM SERPL-MCNC: 8.9 MG/DL (ref 8.7–10.5)
CHLORIDE SERPL-SCNC: 104 MMOL/L (ref 95–110)
CK SERPL-CCNC: 1113 U/L (ref 20–200)
CO2 SERPL-SCNC: 24 MMOL/L (ref 23–29)
CREAT SERPL-MCNC: 0.6 MG/DL (ref 0.5–1.4)
DIFFERENTIAL METHOD: ABNORMAL
EOSINOPHIL # BLD AUTO: 0.3 K/UL (ref 0–0.5)
EOSINOPHIL NFR BLD: 3.6 % (ref 0–8)
ERYTHROCYTE [DISTWIDTH] IN BLOOD BY AUTOMATED COUNT: 13.9 % (ref 11.5–14.5)
EST. GFR  (AFRICAN AMERICAN): >60 ML/MIN/1.73 M^2
EST. GFR  (NON AFRICAN AMERICAN): >60 ML/MIN/1.73 M^2
GLUCOSE SERPL-MCNC: 144 MG/DL (ref 70–110)
HCT VFR BLD AUTO: 31.3 % (ref 40–54)
HGB BLD-MCNC: 10.2 G/DL (ref 14–18)
IMM GRANULOCYTES # BLD AUTO: 0.04 K/UL (ref 0–0.04)
IMM GRANULOCYTES NFR BLD AUTO: 0.5 % (ref 0–0.5)
LYMPHOCYTES # BLD AUTO: 1.2 K/UL (ref 1–4.8)
LYMPHOCYTES NFR BLD: 13.8 % (ref 18–48)
MAGNESIUM SERPL-MCNC: 2 MG/DL (ref 1.6–2.6)
MCH RBC QN AUTO: 31.4 PG (ref 27–31)
MCHC RBC AUTO-ENTMCNC: 32.6 G/DL (ref 32–36)
MCV RBC AUTO: 96 FL (ref 82–98)
MONOCYTES # BLD AUTO: 0.7 K/UL (ref 0.3–1)
MONOCYTES NFR BLD: 7.8 % (ref 4–15)
NEUTROPHILS # BLD AUTO: 6.2 K/UL (ref 1.8–7.7)
NEUTROPHILS NFR BLD: 74.1 % (ref 38–73)
NRBC BLD-RTO: 0 /100 WBC
PHOSPHATE SERPL-MCNC: 3.3 MG/DL (ref 2.7–4.5)
PLATELET # BLD AUTO: 269 K/UL (ref 150–350)
PMV BLD AUTO: 9.9 FL (ref 9.2–12.9)
POCT GLUCOSE: 110 MG/DL (ref 70–110)
POCT GLUCOSE: 131 MG/DL (ref 70–110)
POCT GLUCOSE: 134 MG/DL (ref 70–110)
POCT GLUCOSE: 142 MG/DL (ref 70–110)
POTASSIUM SERPL-SCNC: 4 MMOL/L (ref 3.5–5.1)
PROT SERPL-MCNC: 6.8 G/DL (ref 6–8.4)
RBC # BLD AUTO: 3.25 M/UL (ref 4.6–6.2)
SODIUM SERPL-SCNC: 137 MMOL/L (ref 136–145)
WBC # BLD AUTO: 8.31 K/UL (ref 3.9–12.7)

## 2019-12-05 PROCEDURE — 63600175 PHARM REV CODE 636 W HCPCS: Performed by: NURSE PRACTITIONER

## 2019-12-05 PROCEDURE — 25000003 PHARM REV CODE 250: Performed by: NURSE PRACTITIONER

## 2019-12-05 PROCEDURE — 25000003 PHARM REV CODE 250: Performed by: PSYCHIATRY & NEUROLOGY

## 2019-12-05 PROCEDURE — 63600175 PHARM REV CODE 636 W HCPCS: Performed by: STUDENT IN AN ORGANIZED HEALTH CARE EDUCATION/TRAINING PROGRAM

## 2019-12-05 PROCEDURE — 97530 THERAPEUTIC ACTIVITIES: CPT

## 2019-12-05 PROCEDURE — 93005 ELECTROCARDIOGRAM TRACING: CPT

## 2019-12-05 PROCEDURE — 97116 GAIT TRAINING THERAPY: CPT

## 2019-12-05 PROCEDURE — 11000001 HC ACUTE MED/SURG PRIVATE ROOM

## 2019-12-05 PROCEDURE — 83735 ASSAY OF MAGNESIUM: CPT

## 2019-12-05 PROCEDURE — 82550 ASSAY OF CK (CPK): CPT

## 2019-12-05 PROCEDURE — 97535 SELF CARE MNGMENT TRAINING: CPT

## 2019-12-05 PROCEDURE — 80053 COMPREHEN METABOLIC PANEL: CPT

## 2019-12-05 PROCEDURE — 99232 PR SUBSEQUENT HOSPITAL CARE,LEVL II: ICD-10-PCS | Mod: GC,,, | Performed by: PSYCHIATRY & NEUROLOGY

## 2019-12-05 PROCEDURE — 94668 MNPJ CHEST WALL SBSQ: CPT

## 2019-12-05 PROCEDURE — 99232 SBSQ HOSP IP/OBS MODERATE 35: CPT | Mod: GC,,, | Performed by: PSYCHIATRY & NEUROLOGY

## 2019-12-05 PROCEDURE — 63600175 PHARM REV CODE 636 W HCPCS: Performed by: PHYSICIAN ASSISTANT

## 2019-12-05 PROCEDURE — 93010 ELECTROCARDIOGRAM REPORT: CPT | Mod: ,,, | Performed by: INTERNAL MEDICINE

## 2019-12-05 PROCEDURE — 92507 TX SP LANG VOICE COMM INDIV: CPT

## 2019-12-05 PROCEDURE — 94761 N-INVAS EAR/PLS OXIMETRY MLT: CPT

## 2019-12-05 PROCEDURE — 85025 COMPLETE CBC W/AUTO DIFF WBC: CPT

## 2019-12-05 PROCEDURE — 93010 EKG 12-LEAD: ICD-10-PCS | Mod: ,,, | Performed by: INTERNAL MEDICINE

## 2019-12-05 PROCEDURE — 27000221 HC OXYGEN, UP TO 24 HOURS

## 2019-12-05 PROCEDURE — 84100 ASSAY OF PHOSPHORUS: CPT

## 2019-12-05 RX ORDER — POTASSIUM CHLORIDE 20 MEQ/15ML
60 SOLUTION ORAL
Status: DISCONTINUED | OUTPATIENT
Start: 2019-12-05 | End: 2019-12-06

## 2019-12-05 RX ORDER — HYDROCODONE BITARTRATE AND ACETAMINOPHEN 5; 325 MG/1; MG/1
2 TABLET ORAL EVERY 4 HOURS PRN
Status: DISCONTINUED | OUTPATIENT
Start: 2019-12-05 | End: 2019-12-06 | Stop reason: HOSPADM

## 2019-12-05 RX ORDER — ACETAMINOPHEN 325 MG/1
650 TABLET ORAL EVERY 6 HOURS PRN
Status: DISCONTINUED | OUTPATIENT
Start: 2019-12-05 | End: 2019-12-06 | Stop reason: HOSPADM

## 2019-12-05 RX ORDER — LEVETIRACETAM 500 MG/1
500 TABLET ORAL 2 TIMES DAILY
Status: DISCONTINUED | OUTPATIENT
Start: 2019-12-05 | End: 2019-12-06 | Stop reason: HOSPADM

## 2019-12-05 RX ORDER — CLONAZEPAM 1 MG/1
1 TABLET ORAL 2 TIMES DAILY PRN
Status: DISCONTINUED | OUTPATIENT
Start: 2019-12-05 | End: 2019-12-06 | Stop reason: HOSPADM

## 2019-12-05 RX ORDER — QUETIAPINE FUMARATE 25 MG/1
25 TABLET, FILM COATED ORAL NIGHTLY
Status: DISCONTINUED | OUTPATIENT
Start: 2019-12-05 | End: 2019-12-06

## 2019-12-05 RX ORDER — LEVOTHYROXINE SODIUM 100 UG/1
100 TABLET ORAL
Status: DISCONTINUED | OUTPATIENT
Start: 2019-12-06 | End: 2019-12-06 | Stop reason: HOSPADM

## 2019-12-05 RX ORDER — AMANTADINE HYDROCHLORIDE 50 MG/5ML
100 SOLUTION ORAL 2 TIMES DAILY
Status: DISCONTINUED | OUTPATIENT
Start: 2019-12-05 | End: 2019-12-06

## 2019-12-05 RX ORDER — SODIUM,POTASSIUM PHOSPHATES 280-250MG
2 POWDER IN PACKET (EA) ORAL
Status: DISCONTINUED | OUTPATIENT
Start: 2019-12-05 | End: 2019-12-06

## 2019-12-05 RX ORDER — POTASSIUM CHLORIDE 20 MEQ/15ML
40 SOLUTION ORAL
Status: DISCONTINUED | OUTPATIENT
Start: 2019-12-05 | End: 2019-12-06

## 2019-12-05 RX ORDER — SODIUM CHLORIDE 9 MG/ML
INJECTION, SOLUTION INTRAVENOUS CONTINUOUS
Status: DISCONTINUED | OUTPATIENT
Start: 2019-12-05 | End: 2019-12-06

## 2019-12-05 RX ORDER — POLYETHYLENE GLYCOL 3350 17 G/17G
17 POWDER, FOR SOLUTION ORAL DAILY
Status: DISCONTINUED | OUTPATIENT
Start: 2019-12-06 | End: 2019-12-06 | Stop reason: HOSPADM

## 2019-12-05 RX ORDER — DILTIAZEM HYDROCHLORIDE 30 MG/1
30 TABLET, FILM COATED ORAL EVERY 6 HOURS
Status: DISCONTINUED | OUTPATIENT
Start: 2019-12-05 | End: 2019-12-06 | Stop reason: HOSPADM

## 2019-12-05 RX ORDER — LANOLIN ALCOHOL/MO/W.PET/CERES
800 CREAM (GRAM) TOPICAL
Status: DISCONTINUED | OUTPATIENT
Start: 2019-12-05 | End: 2019-12-06

## 2019-12-05 RX ORDER — SILODOSIN 4 MG/1
4 CAPSULE ORAL DAILY
Status: DISCONTINUED | OUTPATIENT
Start: 2019-12-06 | End: 2019-12-06

## 2019-12-05 RX ORDER — LEVETIRACETAM 5 MG/ML
500 INJECTION INTRAVASCULAR EVERY 12 HOURS
Status: DISCONTINUED | OUTPATIENT
Start: 2019-12-05 | End: 2019-12-05

## 2019-12-05 RX ORDER — AMLODIPINE AND BENAZEPRIL HYDROCHLORIDE 5; 10 MG/1; MG/1
1 CAPSULE ORAL DAILY
Status: DISCONTINUED | OUTPATIENT
Start: 2019-12-06 | End: 2019-12-06 | Stop reason: HOSPADM

## 2019-12-05 RX ORDER — METOPROLOL TARTRATE 25 MG/1
25 TABLET, FILM COATED ORAL 2 TIMES DAILY
Status: DISCONTINUED | OUTPATIENT
Start: 2019-12-05 | End: 2019-12-06 | Stop reason: HOSPADM

## 2019-12-05 RX ADMIN — HYDROCODONE BITARTRATE AND ACETAMINOPHEN 2 TABLET: 5; 325 TABLET ORAL at 07:12

## 2019-12-05 RX ADMIN — DILTIAZEM HYDROCHLORIDE 30 MG: 30 TABLET, FILM COATED ORAL at 05:12

## 2019-12-05 RX ADMIN — MUPIROCIN 1 G: 20 OINTMENT TOPICAL at 09:12

## 2019-12-05 RX ADMIN — AMANTADINE HYDROCHLORIDE 100 MG: 50 SOLUTION ORAL at 09:12

## 2019-12-05 RX ADMIN — AMLODIPINE AND BENAZEPRIL HYDROCHLORIDE 1 CAPSULE: 5; 10 CAPSULE ORAL at 09:12

## 2019-12-05 RX ADMIN — METOPROLOL TARTRATE 25 MG: 25 TABLET ORAL at 09:12

## 2019-12-05 RX ADMIN — ACETAMINOPHEN 650 MG: 325 TABLET ORAL at 03:12

## 2019-12-05 RX ADMIN — SILODOSIN 4 MG: 4 CAPSULE ORAL at 09:12

## 2019-12-05 RX ADMIN — HEPARIN SODIUM 5000 UNITS: 5000 INJECTION, SOLUTION INTRAVENOUS; SUBCUTANEOUS at 02:12

## 2019-12-05 RX ADMIN — DILTIAZEM HYDROCHLORIDE 30 MG: 30 TABLET, FILM COATED ORAL at 04:12

## 2019-12-05 RX ADMIN — THIAMINE HYDROCHLORIDE 100 MG: 100 INJECTION, SOLUTION INTRAMUSCULAR; INTRAVENOUS at 10:12

## 2019-12-05 RX ADMIN — QUETIAPINE FUMARATE 25 MG: 25 TABLET ORAL at 09:12

## 2019-12-05 RX ADMIN — DILTIAZEM HYDROCHLORIDE 30 MG: 30 TABLET, FILM COATED ORAL at 11:12

## 2019-12-05 RX ADMIN — LEVOTHYROXINE SODIUM 100 MCG: 50 TABLET ORAL at 05:12

## 2019-12-05 RX ADMIN — HEPARIN SODIUM 5000 UNITS: 5000 INJECTION, SOLUTION INTRAVENOUS; SUBCUTANEOUS at 09:12

## 2019-12-05 RX ADMIN — LEVETIRACETAM 1000 MG: 10 INJECTION INTRAVENOUS at 09:12

## 2019-12-05 RX ADMIN — SODIUM CHLORIDE: 0.9 INJECTION, SOLUTION INTRAVENOUS at 02:12

## 2019-12-05 RX ADMIN — DILTIAZEM HYDROCHLORIDE 30 MG: 30 TABLET, FILM COATED ORAL at 12:12

## 2019-12-05 RX ADMIN — LEVETIRACETAM 500 MG: 500 TABLET ORAL at 09:12

## 2019-12-05 RX ADMIN — HEPARIN SODIUM 5000 UNITS: 5000 INJECTION, SOLUTION INTRAVENOUS; SUBCUTANEOUS at 05:12

## 2019-12-05 NOTE — PROGRESS NOTES
[facility]  Neurocritical Care  Progress Note    Admit Date: 12/1/2019  Service Date: 12/05/2019  Length of Stay: 4    Subjective:     Chief Complaint: SDH (subdural hematoma)    History of Present Illness: Mr. Linda is a 64 year old male with a PMHx of HTN, HLD, DM2, thyroid disease, anxiety, toe amputation left, and hx of prostate cancer presents as a transfer from [facility] to Neuro Critical Care s/p SDH after mechanical fall +blood alcohol level 216 and getting out of car and hitting head against concrete.  CT Head reveals acute subdural hemorrhage along the right cerebral convexity with worsening mass effect and new leftward midline shift of approximately 12 mm and partial effacement of the basal cisterns. On examination, patient is awake, alert, oriented X 2, follows simple commands, left sided weakness, and left facial droop. No reports of blood thinners.Patient will be Class A to the OR with NGSY. Patient will be admitted to Neuro Critical Care for a higher level of care.     Hospital Course: 12/1 admit to [facility] s/p SDH after mechanical fall +blood alcohol level 216 and getting out of car and hitting head against concrete  12/2: attempted to wean propofol-pt very agitated, continue continue scheduled diazepam 5mg TID, SBP <160, add metoprolol BID,CXR, bisacodyl,SBT and nutrition consult.   12/3: Agitated overnight, Precedex gtt added to Prop. DC'd this AM for extubation. Subgaleal drain removed this AM. Will obtain CTH post drain pull. Continues on TID Valium for withdrawal.   12/4: Agitated overnight, Precedex gtt restarted, off this AM. Poor sleep overnight, drowsy this AM. Post drain pull CT reviewed. Delirium precautions/qhs Seroquel added today w/PRNs available. Continuing on TF until agitation resolves before starting PO. Starting Amantadine today as well as Escudero placement/Silodosin.   12/5: NAEON. Agitation resolved. OOBTC this AM w/improved exam. NGT removed and starting  PO. Will remove Escudero and TTF with NSGY.     Interval History:  NAEON. Agitation resolved. OOBTC this AM w/improved exam. NGT removed and starting PO. Will remove Escudero and TTF with NSGY.     Review of Systems   Constitutional: Negative for chills and fever.   HENT: Negative for tinnitus and voice change.    Eyes: Negative for photophobia and visual disturbance.   Respiratory: Negative for chest tightness and shortness of breath.    Cardiovascular: Negative for chest pain and leg swelling.   Gastrointestinal: Negative for abdominal pain.   Genitourinary: Negative for difficulty urinating and frequency.   Musculoskeletal: Negative for back pain and neck pain.   Neurological: Negative for weakness and headaches.     Objective:     Vitals:  Temp: 98.4 °F (36.9 °C)  Pulse: 93  Rhythm: normal sinus rhythm  BP: 130/67  MAP (mmHg): 92  Resp: 20  SpO2: 97 %  O2 Device (Oxygen Therapy): room air    Temp  Min: 98.3 °F (36.8 °C)  Max: 99.3 °F (37.4 °C)  Pulse  Min: 75  Max: 148  BP  Min: 110/60  Max: 169/81  MAP (mmHg)  Min: 77  Max: 118  Resp  Min: 16  Max: 28  SpO2  Min: 94 %  Max: 100 %    12/04 0701 - 12/05 0700  In: 3557.3 [I.V.:422.3]  Out: 3695 [Urine:3695]   Unmeasured Output  Urine Occurrence: 1  Stool Occurrence: 1  Pad Count: 1       Physical Exam  GA: comfortable, no acute distress. OOBTC  HEENT: No scleral icterus or JVD.   Pulmonary: extubated on RA  Cardiac: RRR   Abdominal: Abdomen soft  Skin: No jaundice, rashes, or visible lesions.  Neuro:  --GCS: E4 V4 M6 oriented to name, date w/multiple attempts, city  --Pupils 4mm, PERRL.   --LIPSCOMB spont, follows commands    Medications:  Continuous Scheduled  amantadine HCl 100 mg BID   [START ON 12/6/2019] amlodipine-benazepril 5-10 mg 1 capsule Daily   bisacodyl 10 mg Daily   diltiaZEM 30 mg Q6H   heparin (porcine) 5,000 Units Q8H   levETIRAcetam 500 mg BID   [START ON 12/6/2019] levothyroxine 100 mcg Before breakfast   metoprolol tartrate 25 mg BID   mupirocin 1 g BID    [START ON 12/6/2019] polyethylene glycol 17 g Daily   QUEtiapine 25 mg QHS   [START ON 12/6/2019] silodosin 4 mg Daily   thiamine (VITAMIN B1) IVPB 100 mg Daily   PRN  acetaminophen 650 mg Q6H PRN   acetaminophen 650 mg Q6H PRN   albuterol-ipratropium 3 mL Q6H PRN   clonazePAM 1 mg BID PRN   Dextrose 10% Bolus 12.5 g PRN   glucagon (human recombinant) 1 mg PRN   hydrALAZINE 10 mg Q4H PRN   HYDROcodone-acetaminophen 2 tablet Q4H PRN   insulin aspart U-100 1-10 Units Q6H PRN   labetalol 10 mg Q4H PRN   magnesium oxide 800 mg PRN   magnesium oxide 800 mg PRN   OLANZapine 2.5 mg BID PRN   ondansetron 4 mg Q8H PRN   potassium chloride 10% 40 mEq PRN   potassium chloride 10% 40 mEq PRN   potassium chloride 10% 60 mEq PRN   potassium, sodium phosphates 2 packet PRN   potassium, sodium phosphates 2 packet PRN   potassium, sodium phosphates 2 packet PRN   sodium chloride 0.9% 10 mL PRN     Today I personally reviewed pertinent imaging, notably:    X-ray Chest 1 View    Result Date: 12/5/2019  No significant detrimental interval change in the appearance of the chest since 12/04/2019. Electronically signed by: Rony Glasgow MD Date:    12/05/2019 Time:    06:54      Diet  Diet Dysphagia Mechanical Soft (IDDSI Level 5) [facility]; Thin  Diet Dysphagia Mechanical Soft (IDDSI Level 5) [facility]; Thin          Assessment/Plan:     Neuro  * SDH (subdural hematoma)  Right Traumatic SDH   --Continue Neuro checks q 1hr  -- 12/2: POD#4 s/p right craniotomy for SDH evacuation (12/1)  -- off cEEG, no seizure activity  -- Subgaleal drain removed, post pull CTH appropriate  -- SBP <140  -- continue valium 5 mg TID-for agitation  -- Amantadine BID  --Sertraline qhs    -- DC Grace  -- DC Escudero      Seizure prophylaxis  -off cEEG  -keppra 1gm BID, wean to 500 BID today    Pulmonary  Acute respiratory failure with hypoxia and hypercapnia  On RA  CPT, duonebs PRN    Cardiac/Vascular  Essential hypertension  Hypertension  -- Continue  to monitor HR and BP   --SBP goal < 140  · -- 2D echo-Concentric left ventricular remodeling.  · Normal left ventricular systolic function. The estimated ejection fraction is 60%  · Normal right ventricular systolic function.  · Normal LV diastolic function.  No wall motion abnormalities  --Continue home med amlodipine-benazepril 5-10 daily  --Afib w/RVR on 12/4: continue dilt, metop    Renal/  Urinary retention  H/o prostate cancer  H/o overflow incontinence per patient  -- silodosin  -- DC wilson    Endocrine  Diabetes mellitus  -- IxgzV5m 6.3  -- SSI   -- ok for Puree/thin diet    Orthopedic  Non-traumatic rhabdomyolysis  --CK daily  --NS@100          The patient is being Prophylaxed for:  Venous Thromboembolism with: Mechanical or Chemical  Stress Ulcer with: Not Applicable   Ventilator Pneumonia with: not applicable    Activity Orders          Diet Dysphagia Mechanical Soft (IDDSI Level 5) [facility]; Thin: Dysphagia 2 (Mechanical Soft Ground) starting at 12/05 1315    Straight Cath starting at 12/03 1300        Full Code    Adrienne Gooden MD  Neurocritical Care  [facility]

## 2019-12-05 NOTE — PLAN OF CARE
Problem: Physical Therapy Goal  Goal: Physical Therapy Goal  Description  Goals to be met by: 2019     Patient will increase functional independence with mobility by performin. Supine to sit with Contact Guard Assistance  2. Sit to supine with Contact Guard Assistance  3. Sit to stand transfer with Stand-by Assistance  4. Bed to chair transfer with Minimal Assistance using least restrictive device or no AD. - Met   Bed to chair transfer with stand by assistance.   5. Gait  x 40 feet with Contact Guard Assistance using least restrictive device or no AD.   6. Stand for 3 minutes with Stand-by Assistance using  No AD to improve static standing balance and prepare for functional activities in standing.   7. Lower extremity exercise program x20 reps per handout, with independence to improve strength and activity tolerance.      Outcome: Ongoing, Progressing   Continue with plan of care.   Monika Ulloa, PT  2019

## 2019-12-05 NOTE — NURSING
Patient placed on portable telemetry monitor and central telemetry monitoring notified.    Box No.: [id number]  Tech:Betty  Rhythm: NSR  Rate: 86    Report given to EMEKA Armenta

## 2019-12-05 NOTE — PLAN OF CARE
SARAH met with Joan with [facility] re[facility] at [facility]. She advised having met with the family and will follow for possible acceptance when medically ready.    NAVA SofiaW  Neurocritical Care   [facility]  [id number]

## 2019-12-05 NOTE — PLAN OF CARE
POC reviewed with pt and family at 1800. Pt unable to verbalize understanding due to forgetfulness and confusion at times. Questions and concerns addressed with family. Patient converted to Afib with RVR ( HR in the 140's) this afternoon. Metoprolol IV x 3 doses and  ml bolus given with no improvement in HR. Diltiazem 20 mg IVP given and HR decreased from 140's to low 100's. Oral feeds recommended per SLP held for now and NGT feedings continued at 65 ml/hr per NCC team's order- tolerating feedings well. Small BMs x 3 today. Escudero catheter inserted due to urinary retention. Will continue to monitor. See flowsheets for full assessment and VS info.

## 2019-12-05 NOTE — PLAN OF CARE
POC reviewed with pt at 0500. Pt verbalized understanding. Questions and concerns addressed with pt and family. 10 mg Diltiazem IVP given x 2 without resolution, 5 mg metoprolol IVP given-see MAR. PRN pain med given x 1 for H/A. 3 loose BMs. Pt progressing toward goals. Will continue to monitor. See flowsheets for full assessment and VS info

## 2019-12-05 NOTE — ASSESSMENT & PLAN NOTE
Hypertension  -- Continue to monitor HR and BP   --SBP goal < 140  · -- 2D echo-Concentric left ventricular remodeling.  · Normal left ventricular systolic function. The estimated ejection fraction is 60%  · Normal right ventricular systolic function.  · Normal LV diastolic function.  No wall motion abnormalities  --Continue home med amlodipine-benazepril 5-10 daily  --Afib w/RVR on 12/4: continue dilt, metop

## 2019-12-05 NOTE — PROGRESS NOTES
NCC made aware that pharmacy just delivered 10 mg Diltiazem IVP and was administered to pt. Per SHANITA Peck, hold 30 mg Diltiazem tablet scheduled for 1900.

## 2019-12-05 NOTE — NURSING
Noted pink, blanchable skin to back of head. Wound care consult placed and evaluated per wound care RN. Foam dressing applied and offloading done.

## 2019-12-05 NOTE — PT/OT/SLP PROGRESS
Speech Language Pathology Treatment    Patient Name:  Jermaine Linda   MRN:  [id number]  Admitting Diagnosis: SDH (subdural hematoma)    Recommendations:                 General Recommendations:  Dysphagia therapy and Speech/language therapy  Diet recommendations:  Mechanical soft, Liquid Diet Level: Thin   Aspiration Precautions: 1 bite/sip at a time, Meds whole 1 at a time and Standard aspiration precautions   General Precautions: Standard,    Communication strategies:  none    Subjective     Pt awake and pleasant     Pain/Comfort:  · Pain Rating 1: 0/10  · Pain Rating Post-Intervention 1: 0/10    Objective:     Has the patient been evaluated by SLP for swallowing?   Yes  Keep patient NPO? No   Current Respiratory Status:    Nasal cannula, initially since removed     Pt seen in conjunction with OT this visit. Pt more alert this visit although response time remains delayed in nature overall. Pt oriented to self, location and situation independently. Pt warranting cues to orient to month and year but with review was able to independently recall at the end of the session.  Pt followed single step directions with 90% acc independently. Pt with general flat affect however engaged in conversation and asking appropriate questions re: overall care. Ongoing higher level cognitive assessment and treatment warranted.     Pt with NG tube still in place and reporting discomfort. SLP previously recommended diet of thin liquids and purees. Pt offered trials of thin liquids and regular solids. Pt initially taking multiple sips of thin liquids at one time with cough response however with return to single sips at a time pt without any additional concerns of aspiration and strong clear vocal quality. Given overall delayed processing SLP discussed cautiously advance diet to mechanical soft solids and thin liqiuds. SLP reviewed with pt and spouse at the bedside diet recommendations and precautions. All parties in agreement and  whiteboard updated.     Assessment:     Jermaine Linda is a 64 y.o. male with an SLP diagnosis of Dysphagia and Cognitive-Linguistic Impairment.     Goals:   Multidisciplinary Problems     SLP Goals        Problem: SLP Goal    Goal Priority Disciplines Outcome   SLP Goal     SLP Ongoing, Progressing   Description:  Speech Language Pathology Goals  Goals expected to be met by 12/10    1. Pt will tolerate diet of thin liquids and purees without overt clinical signs of aspiration   2. Pt will participate in trials of soft solids within speech therapy sessions to help determine least restrictive diet   3. Pt will demonstrate orientation to self, place, situation , family members, date w/ min cues   4. Pt will complete problem solving skills w/ 75 % acc to enhance safety awareness   5. Pt will generate 5 items per category to enhance organizations skills   6. Pt will follow single step directions w/ 80% acc w/ min cues to enhance comprehension skills   7.  Pt will participate in ongoing assessment of speech language and cognitive linguistic skills to help rule out deficits and determine therapeutic plan of care                         Plan:     · Patient to be seen:  4 x/week   · Plan of Care expires:     · Plan of Care reviewed with:  patient   · SLP Follow-Up:  Yes       Discharge recommendations:  rehabilitation facility   Barriers to Discharge:  Level of Skilled Assistance Needed      Time Tracking:     SLP Treatment Date:   12/05/19  Speech Start Time:  0852  Speech Stop Time:  0913     Speech Total Time (min):  21 min    Billable Minutes: Speech Therapy Individual 11 and Treatment Swallowing Dysfunction 10    Keshia Keys CCC-SLP  12/05/2019

## 2019-12-05 NOTE — CONSULTS
Wound consult received on patient's skin breakdown to back of head. Blanchable pink tissue noted, approximately 5cm x 5cm, skin intact. Recommend offloading and pad with foam dressing, change twice a week.  No breakdown or pressure injuries noted to bony prominences to sacrum, back, heels or beneath medical devices.   Waffle [facility] and [facility].   Nursing to continue care.  Re-consult wound care if needed.

## 2019-12-05 NOTE — ASSESSMENT & PLAN NOTE
Right Traumatic SDH   --Continue Neuro checks q 1hr  -- 12/2: POD#4 s/p right craniotomy for SDH evacuation (12/1)  -- off cEEG, no seizure activity  -- Subgaleal drain removed, post pull CTH appropriate  -- SBP <140  -- continue valium 5 mg TID-for agitation  -- Amantadine BID  --Sertraline qhs    -- DC Grace  -- RONALD Escudero

## 2019-12-05 NOTE — SUBJECTIVE & OBJECTIVE
Interval History:  NAEON. Agitation resolved. OOBTC this AM w/improved exam. NGT removed and starting PO. Will remove Escudero and TTF with NSGY.     Review of Systems   Constitutional: Negative for chills and fever.   HENT: Negative for tinnitus and voice change.    Eyes: Negative for photophobia and visual disturbance.   Respiratory: Negative for chest tightness and shortness of breath.    Cardiovascular: Negative for chest pain and leg swelling.   Gastrointestinal: Negative for abdominal pain.   Genitourinary: Negative for difficulty urinating and frequency.   Musculoskeletal: Negative for back pain and neck pain.   Neurological: Negative for weakness and headaches.     Objective:     Vitals:  Temp: 98.4 °F (36.9 °C)  Pulse: 93  Rhythm: normal sinus rhythm  BP: 130/67  MAP (mmHg): 92  Resp: 20  SpO2: 97 %  O2 Device (Oxygen Therapy): room air    Temp  Min: 98.3 °F (36.8 °C)  Max: 99.3 °F (37.4 °C)  Pulse  Min: 75  Max: 148  BP  Min: 110/60  Max: 169/81  MAP (mmHg)  Min: 77  Max: 118  Resp  Min: 16  Max: 28  SpO2  Min: 94 %  Max: 100 %    12/04 0701 - 12/05 0700  In: 3557.3 [I.V.:422.3]  Out: 3695 [Urine:3695]   Unmeasured Output  Urine Occurrence: 1  Stool Occurrence: 1  Pad Count: 1       Physical Exam  GA: comfortable, no acute distress. OOBTC  HEENT: No scleral icterus or JVD.   Pulmonary: extubated on RA  Cardiac: RRR   Abdominal: Abdomen soft  Skin: No jaundice, rashes, or visible lesions.  Neuro:  --GCS: E4 V4 M6 oriented to name, date w/multiple attempts, city  --Pupils 4mm, PERRL.   --LIPSCOMB spont, follows commands    Medications:  Continuous Scheduled  amantadine HCl 100 mg BID   [START ON 12/6/2019] amlodipine-benazepril 5-10 mg 1 capsule Daily   bisacodyl 10 mg Daily   diltiaZEM 30 mg Q6H   heparin (porcine) 5,000 Units Q8H   levETIRAcetam 500 mg BID   [START ON 12/6/2019] levothyroxine 100 mcg Before breakfast   metoprolol tartrate 25 mg BID   mupirocin 1 g BID   [START ON 12/6/2019] polyethylene glycol 17  g Daily   QUEtiapine 25 mg QHS   [START ON 12/6/2019] silodosin 4 mg Daily   thiamine (VITAMIN B1) IVPB 100 mg Daily   PRN  acetaminophen 650 mg Q6H PRN   acetaminophen 650 mg Q6H PRN   albuterol-ipratropium 3 mL Q6H PRN   clonazePAM 1 mg BID PRN   Dextrose 10% Bolus 12.5 g PRN   glucagon (human recombinant) 1 mg PRN   hydrALAZINE 10 mg Q4H PRN   HYDROcodone-acetaminophen 2 tablet Q4H PRN   insulin aspart U-100 1-10 Units Q6H PRN   labetalol 10 mg Q4H PRN   magnesium oxide 800 mg PRN   magnesium oxide 800 mg PRN   OLANZapine 2.5 mg BID PRN   ondansetron 4 mg Q8H PRN   potassium chloride 10% 40 mEq PRN   potassium chloride 10% 40 mEq PRN   potassium chloride 10% 60 mEq PRN   potassium, sodium phosphates 2 packet PRN   potassium, sodium phosphates 2 packet PRN   potassium, sodium phosphates 2 packet PRN   sodium chloride 0.9% 10 mL PRN     Today I personally reviewed pertinent imaging, notably:    X-ray Chest 1 View    Result Date: 12/5/2019  No significant detrimental interval change in the appearance of the chest since 12/04/2019. Electronically signed by: Rony Glasgow MD Date:    12/05/2019 Time:    06:54      Diet  Diet Dysphagia Mechanical Soft (IDDSI Level 5) [facility]; Thin  Diet Dysphagia Mechanical Soft (IDDSI Level 5) [facility]; Thin

## 2019-12-05 NOTE — PLAN OF CARE
Goals remain appropriate. Cont to rec rehab at this time. GENA Huston 12/5/2019   Problem: Occupational Therapy Goal  Goal: Occupational Therapy Goal  Description  Goals to be met by: 12/18     Patient will increase functional independence with ADLs by performing:    Pt will report daily orientation x4 with added time and 0 added cues. progressing  Supine<>sit with CGA and HOB flat. progressing  UE Dressing while seated EOB with Minimal Assistance.  LB Dressing (donning pull up brief) with Minimal Assistance.   Grooming while standing with Minimal Assistance.  Toileting from bedside commode with Minimal Assistance for hygiene and clothing management.   Toilet transfer to bedside commode with Minimal Assistance.      Outcome: Ongoing, Progressing

## 2019-12-05 NOTE — PROGRESS NOTES
[facility]  Adult Nutrition  Progress Note    SUMMARY       Recommendations    Recommendation/Intervention:   1. Continue Glucerna 1.5 @ 65mL/hr.   - Provides 2160kcals, 119g protein and 1093mL free water.     2. As able to advance diet, recommend Diabetic with texture per SLP recommendations.   Add Boost Glucose TID if po intake poor.     RD to monitor.    Goals: Pt to receive >85% EEN and EPN by RD follow up  Nutrition Goal Status: goal met  Communication of RD Recs: reviewed with RN    Reason for Assessment    Reason For Assessment: RD follow-up  Diagnosis: other (see comments)(SDH)  Relevant Medical History: HTN, HLD, T2DM, thyroid disease, L toe amputation, prostate cancer  Interdisciplinary Rounds: attended  General Information Comments: Pt extubated 12/3. NG tube remains in place, TF at goal. SLP recs for mechanical soft/ thin liquids. Po intake held 2/2 pt's agitation. No family at bedside to provide nutrition hx. No weight hx in  chart review. NFPE completed 12/2- pt nourished, obese with no indications of malnutrition at this time.  Nutrition Discharge Planning: adequate po intake for optimal nutrition    Nutrition Risk Screen    Nutrition Risk Screen: tube feeding or parenteral nutrition    Nutrition/Diet History    Spiritual, Cultural Beliefs, Restorationist Practices, Values that Affect Care: no  Factors Affecting Nutritional Intake: NPO, behavioral (mealtime)    Anthropometrics    Temp: 98.4 °F (36.9 °C)  Height: 6' (182.9 cm)  Height (inches): 72 in  Weight Method: Bed Scale  Weight: 102.1 kg (225 lb)  Weight (lb): 225 lb  Ideal Body Weight (IBW), Male: 178 lb  % Ideal Body Weight, Male (lb): 126.4 lb  BMI (Calculated): 30.5  BMI Grade: 30 - 34.9- obesity - grade I       Lab/Procedures/Meds    Pertinent Labs Reviewed: reviewed  Pertinent Labs Comments: POCT Glu 110-142  Pertinent Medications Reviewed: reviewed  Pertinent Medications Comments: precedex    Estimated/Assessed  Needs    Weight Used For Calorie Calculations: 102.1 kg (225 lb 1.4 oz)  Energy Calorie Requirements (kcal): 2323  Energy Need Method: Erath-St Jeor(PAL 1.25)  Protein Requirements: 102-123g(1.0-1.2g/kg)  Weight Used For Protein Calculations: 102.1 kg (225 lb 1.4 oz)  Fluid Requirements (mL): 1mL/kcal or per MD     RDA Method (mL): 2323  CHO Requirement: 283g CHO daily      Nutrition Prescription Ordered    Current Diet Order: NPO  Current Nutrition Support Formula Ordered: Glucerna 1.5  Current Nutrition Support Rate Ordered: 65 (ml)  Current Nutrition Support Frequency Ordered: mL/hr    Evaluation of Received Nutrient/Fluid Intake    Enteral Calories (kcal): 2160  Enteral Protein (gm): 119  Enteral (Free Water) Fluid (mL): 1093    % Kcal Needs: 93  % Protein Needs: 100    I/O: -6L since admit, good UOP, LBM 12/5    Energy Calories Required: meeting needs  Protein Required: meeting needs  Fluid Required: other (see comments)(per MD)    Comments: 0mL residuals 12/5  Tolerance: tolerating    % Intake of Estimated Energy Needs: 75 - 100 %  % Meal Intake: NPO    Nutrition Risk    Level of Risk/Frequency of Follow-up: high(f/u 2x/week)     Assessment and Plan    Nutrition Problem  Inadequate energy intake     Related to (etiology):   TF provision     Signs and Symptoms (as evidenced by):   Pt receiving <85% EEN and EPN.      Interventions(treatment strategy):  Collaboration of care with providers     Nutrition Diagnosis Status:   Resolved       Monitor and Evaluation    Food and Nutrient Intake: energy intake, food and beverage intake, enteral nutrition intake  Food and Nutrient Adminstration: diet order, enteral and parenteral nutrition administration  Anthropometric Measurements: weight, weight change, body mass index  Biochemical Data, Medical Tests and Procedures: electrolyte and renal panel, gastrointestinal profile, glucose/endocrine profile, inflammatory profile, lipid profile  Nutrition-Focused Physical  Findings: overall appearance     Malnutrition Assessment                 Orbital Region (Subcutaneous Fat Loss): well nourished  Upper Arm Region (Subcutaneous Fat Loss): well nourished  Thoracic and Lumbar Region: well nourished   Hindu Region (Muscle Loss): well nourished  Clavicle Bone Region (Muscle Loss): well nourished  Clavicle and Acromion Bone Region (Muscle Loss): well nourished  Scapular Bone Region (Muscle Loss): well nourished  Dorsal Hand (Muscle Loss): well nourished  Patellar Region (Muscle Loss): well nourished  Anterior Thigh Region (Muscle Loss): well nourished  Posterior Calf Region (Muscle Loss): well nourished                 Nutrition Follow-Up    RD Follow-up?: Yes

## 2019-12-05 NOTE — PROGRESS NOTES
[facility]  Neurosurgery  Progress Note    Subjective:     History of Present Illness: 64yom w pmh of HTN, HLD, DM2, thyroid disease, anxiety, and hx of prostate cancer presents after mechanical fall getting out of car and hitting head against [facility] ED to have R SDH. [place] [facility] [place]TH showed worsening SDH with icreased mass effect, and decline in mental status in ED.    Post-Op Info:  Procedure(s) (LRB):  Right CRANIOTOMY, FOR SUBDURAL HEMATOMA EVACUATION  (Right)   4 Days Post-Op     Interval History: No acute event overnight.  Exam is stable.  Had some agitation on Precedex.    Medications:  Continuous Infusions:   dexmedetomidine (PRECEDEX) infusion Stopped (12/04/19 0915)     Scheduled Meds:   amantadine HCl  100 mg Per NG tube BID    amlodipine-benazepril 5-10 mg  1 capsule Per NG tube Daily    bisacodyl  10 mg Rectal Daily    diltiaZEM  30 mg Per NG tube Q6H    heparin (porcine)  5,000 Units Subcutaneous Q8H    levetiracetam IVPB  1,000 mg Intravenous Q12H    levothyroxine  100 mcg Per NG tube Before breakfast    metoprolol tartrate  25 mg Per NG tube BID    mupirocin  1 g Nasal BID    polyethylene glycol  17 g Per NG tube Daily    QUEtiapine  25 mg Per NG tube QHS    silodosin  4 mg Per NG tube Daily    thiamine (VITAMIN B1) IVPB  100 mg Intravenous Daily     PRN Meds:acetaminophen, acetaminophen, albuterol-ipratropium, clonazePAM, Dextrose 10% Bolus, glucagon (human recombinant), hydrALAZINE, HYDROcodone-acetaminophen, insulin aspart U-100, labetalol, magnesium oxide, magnesium oxide, OLANZapine, ondansetron, potassium chloride 10%, potassium chloride 10%, potassium chloride 10%, potassium, sodium phosphates, potassium, sodium phosphates, potassium, sodium phosphates, sodium chloride 0.9%     Review of Systems    Objective:     Weight: 102.1 kg (225 lb)  Body mass index is 30.52 kg/m².  Vital Signs (Most Recent):  Temp: 98.3 °F (36.8  °C) (12/05/19 0705)  Pulse: 86 (12/05/19 0830)  Resp: (!) 24 (12/05/19 0830)  BP: (!) 166/74 (12/05/19 0805)  SpO2: 99 % (12/05/19 0830) Vital Signs (24h Range):  Temp:  [98.3 °F (36.8 °C)-99.3 °F (37.4 °C)] 98.3 °F (36.8 °C)  Pulse:  [] 86  Resp:  [16-28] 24  SpO2:  [94 %-100 %] 99 %  BP: (110-169)/(60-89) 166/74  Arterial Line BP: ()/(57-94) 92/73     Date 12/05/19 0700 - 12/06/19 0659   Shift 8005-0044 6255-6032 9045-7231 24 Hour Total   INTAKE   I.V.(mL/kg) 10(0.1)   10(0.1)   NG/   130   Shift Total(mL/kg) 140(1.4)   140(1.4)   OUTPUT   Urine(mL/kg/hr) 225   225   Shift Total(mL/kg) 225(2.2)   225(2.2)   Weight (kg) 102.1 102.1 102.1 102.1                        NG/OG Tube 12/02/19 0001 Left nostril (Active)   Placement Check placement verified by distal tube length measurement;placement verified by x-ray 12/4/2019  3:02 AM   Advancement advanced manually 12/3/2019  3:02 PM   Tolerance no signs/symptoms of discomfort 12/4/2019  3:02 AM   Securement secured to nostril center w/ adhesive device 12/4/2019  3:02 AM   Clamp Status/Tolerance clamped 12/4/2019  3:02 AM   Suction Setting/Drainage Method dependent drainage 12/3/2019  3:02 PM   Insertion Site Appearance no redness, warmth, tenderness, skin breakdown, drainage 12/4/2019  3:02 AM   Drainage None 12/4/2019  3:02 AM   Flush/Irrigation flushed w/;water;no resistance met 12/4/2019  3:02 AM   Feeding Method continuous 12/4/2019  3:02 AM   Feeding Action feeding held 12/4/2019  3:02 AM   Current Rate (mL/hr) 0 mL/hr 12/4/2019  3:02 AM   Goal Rate (mL/hr) 65 mL/hr 12/4/2019  3:02 AM   Intake (mL) 0 mL 12/4/2019  6:02 AM   Water Bolus (mL) 0 mL 12/4/2019  6:02 AM   Rate Formula Tube Feeding (mL/hr) 65 mL/hr 12/4/2019  3:02 AM   Formula Name Glucerna 12/4/2019  3:02 AM   Intake (mL) - Formula Tube Feeding 10 12/4/2019  8:05 AM   Residual Amount (ml) 0 ml 12/4/2019  3:02 AM       Neurosurgery Physical Exam  [id number]  Oriented to himself and  place  PERRL  FCX4    Significant Labs:  Recent Labs   Lab 12/04/19 0221 12/04/19  0942 12/05/19  0302   *  --  144*     --  137   K 3.6 4.1 4.0     --  104   CO2 23  --  24   BUN 6*  --  10   CREATININE 0.7  --  0.6   CALCIUM 8.6*  --  8.9   MG 1.9  --  2.0     Recent Labs   Lab 12/04/19 0221 12/05/19  0302   WBC 7.83 8.31   HGB 9.1* 10.2*   HCT 27.9* 31.3*    269     No results for input(s): LABPT, INR, APTT in the last 48 hours.  Microbiology Results (last 7 days)     Procedure Component Value Units Date/Time    Culture, Respiratory with Gram Stain [[id number]]  (Abnormal)  (Susceptibility) Collected:  12/02/19 0825    Order Status:  Completed Specimen:  Respiratory from Tracheal Aspirate Updated:  12/04/19 1344     Respiratory Culture No S aureus or Pseudomonas isolated.      SERRATIA MARCESCENS  Many       Gram Stain (Respiratory) <10 epithelial cells per low power field.     Gram Stain (Respiratory) No WBC's     Gram Stain (Respiratory) Moderate Gram negative rods    Blood culture [[id number]] Collected:  12/02/19 0824    Order Status:  Completed Specimen:  Blood from Peripheral, Hand, Right Updated:  12/04/19 1212     Blood Culture, Routine No Growth to date      No Growth to date      No Growth to date            Significant Diagnostics:  CT head: reviewed, stable post drain removal.    Assessment/Plan:     * SDH (subdural hematoma)  64yom w pmh of HTN, HLD, DM2, thyroid disease, anxiety, and hx of prostate cancer presents after mechanical fall getting out of car and hitting head against concrete found at [facility] ED to have R SDH. [place] [facility] [place] showed worsening SDH with icreased mass effect, and decline in mental status in ED. Now s/p right craniotomy for SDH evacuation (12/1):      Last follow-up CT satisfactory  On Precedex for agitation, wean to off per ICU team  Wound clean dry intact  Continue Keppra for seizure prophylaxis, EEG negative for  seizure  Keep systolic below 150  Speech cleared for dysphagia diet, NGT removal when appropriate per ICU team  Subcu heparin for DVT prophylaxis  Daily PT OT  Out of bed  Further care per ICU team  May stop down to neurosurgery when deemed appropriate by ICU team and when off Precedex           Jacinta Leblanc MD  Neurosurgery  [facility]

## 2019-12-05 NOTE — PT/OT/SLP PROGRESS
"Occupational Therapy   Treatment    Name: Jermaine Linda  MRN: [id number]  Admitting Diagnosis:  SDH (subdural hematoma)  4 Days Post-Op    Recommendations:     Discharge Recommendations: rehabilitation facility  Discharge Equipment Recommendations:  (TBD with progression in care; at next level of care)  Barriers to discharge:  Other (Comment)(remains in ICU; level of skilled assistance required)    Assessment:     Jermaine Linda is a 64 y.o. male with a medical diagnosis of SDH (subdural hematoma).  He presents with mild L hemiparesis with proprioceptive and intrinsic weakness. He demo improvement in mentation and sustained attention to tasks on this date. Cont to recommend rehab at this time pending further progression in care. Performance deficits affecting function are weakness, impaired endurance, impaired self care skills, impaired functional mobilty, gait instability, impaired balance, pain, decreased upper extremity function, decreased lower extremity function, impaired cardiopulmonary response to activity, impaired skin.     Rehab Prognosis:  Good; patient would benefit from acute skilled OT services to address these deficits and reach maximum level of function.       Plan:     Patient to be seen 4 x/week to address the above listed problems via self-care/home management, therapeutic activities, therapeutic exercises, neuromuscular re-education, cognitive retraining  · Plan of Care Expires: 01/03/20  · Plan of Care Reviewed with: patient, spouse    Subjective   "You can take those off, I'm not going to pull anything"  Pain/Comfort:  · Pain Rating 1: 0/10  · Pain Rating Post-Intervention 1: 0/10    Objective:     Communicated with: RN prior to session.  Patient found HOB elevated with arterial line, blood pressure cuff, wilson catheter, telemetry, SCD, pulse ox (continuous), restraints, peripheral IV, NG tube upon OT entry to room.    General Precautions: Standard, aspiration, fall, diabetic, seizure, " mechanical soft diet   Orthopedic Precautions:N/A   Braces: N/A     Occupational Performance:     Bed Mobility:  HOB 20*  · Patient completed Rolling/Turning to Right with minimum assistance  · Patient completed Supine to Sit with minimum assistance and with side rail     Functional Mobility/Transfers:  · Patient completed Sit <> Stand Transfer with minimum assistance  with  no assistive device   · Patient completed Bed <> Chair Transfer using Step Transfer technique with minimum assistance with no assistive device  · Functional Mobility: ~4 steps to chair (to L) with min(A); no AD    Activities of Daily Living:  · Feeding:  supervision and set up; with SLP -- cup to mouth using B UE; cracker to mouth with L hand (decreased intrisnic strength and proprioception and modulation noted)  · Upper Body Dressing: minimum assistance EOB; to doff gown and don clean gown-- assist in part 2/2 multiple lines  · Lower Body Dressing: contact guard assistance and added time; seated in chair; modified 4 point position B socks    AMPA 6 Click ADL: 19    Treatment & Education:  -Pt alert with eyes open throughout; following 100% of simple step commands with added time as needed  -Pt reported orientation x4; requiring cue for year  -EOB with CGA for postural control  -re edu spouse on POC at this time  -[facility] pt on need for staff assist/call light assistance for transfers with teach back understanding at cessation of session  -Communication board updated; questions/concerns addressed within OT scope of practice    *re [facility] RN on level of assistance needed for t/f and encouraged use of bedside commode for toileting task    Patient left up in chair with all lines intact, call button in reach, RN notified and wife presentEducation:      GOALS:   Multidisciplinary Problems     Occupational Therapy Goals        Problem: Occupational Therapy Goal    Goal Priority Disciplines Outcome Interventions   Occupational Therapy Goal     OT, PT/OT  Ongoing, Progressing    Description:  Goals to be met by: 12/18     Patient will increase functional independence with ADLs by performing:    Pt will report daily orientation x4 with added time and 0 added cues. progressing  Supine<>sit with CGA and HOB flat. progressing  UE Dressing while seated EOB with Minimal Assistance.  LB Dressing (donning pull up brief) with Minimal Assistance.   Grooming while standing with Minimal Assistance.  Toileting from bedside commode with Minimal Assistance for hygiene and clothing management.   Toilet transfer to bedside commode with Minimal Assistance.                       Time Tracking:     OT Date of Treatment: 12/05/19  OT Start Time: 0855  OT Stop Time: 0918  OT Total Time (min): 23 min    Billable Minutes:Self Care/Home Management 10  Therapeutic Activity 13    GENA Huston  12/5/2019

## 2019-12-05 NOTE — PLAN OF CARE
Good progress towards goals     Keshia Keys MS, CCC-SLP  Speech Language Pathologist  Pager: [phone]  Date 12/5/2019

## 2019-12-05 NOTE — ASSESSMENT & PLAN NOTE
64yom w pmh of HTN, HLD, DM2, thyroid disease, anxiety, and hx of prostate cancer presents after mechanical fall getting out of car and [facility]. pt transferred to [facility] and repeat CTH showed worsening SDH with icreased mass effect, and decline in mental status in ED. Now s/p right craniotomy for SDH evacuation (12/1):      Last follow-up CT satisfactory  On Precedex for agitation, wean to off per ICU team  Wound clean dry intact  Continue Keppra for seizure prophylaxis, EEG negative for seizure  Keep systolic below 150  Speech cleared for dysphagia diet, NGT removal when appropriate per ICU team  Subcu heparin for DVT prophylaxis  Daily PT OT  Out of bed  Further care per ICU team  May stop down to neurosurgery when deemed appropriate by ICU team and when off Precedex

## 2019-12-05 NOTE — PT/OT/SLP PROGRESS
Physical Therapy Treatment    Patient Name:  Jermaine Linda   MRN:  [id number]    Recommendations:     Discharge Recommendations:  rehabilitation facility   Discharge Equipment Recommendations: (TBD pending progression with therapy at next level of care)   Barriers to discharge: Inaccessible home, Decreased caregiver support and patient below functional baseline    Assessment:     Jermaine Linda is a 64 y.o. male admitted with a medical diagnosis of SDH (subdural hematoma).  He presents with the following impairments/functional limitations:  weakness, impaired endurance, impaired self care skills, gait instability, impaired functional mobilty, impaired balance, decreased coordination, impaired cognition, decreased upper extremity function, decreased lower extremity function, decreased safety awareness, pain, impaired fine motor, impaired coordination, impaired cardiopulmonary response to activity. The patient demonstrates significant improvement in orientation, attention, ability to follow instructions. The patient demonstrates mild coordination and proprioceptive impairment L LE as well as impaired static and dynamic standing balance. The patient ambulated 8' x 2 + 12' with minimum assistance, hand held-assist. Based on his balance impairment and decreased activity tolerance, he is at increased risk of falls and is not safe to return home.  Based on the patient's progress with therapy, motivation to participate in treatment, and prior level of independence, they are an excellent candidate for inpatient rehabilitation and they would benefit from rehab to maximize their functional potential.      Rehab Prognosis: Good; patient would benefit from acute skilled PT services to address these deficits and reach maximum level of function.    Recent Surgery: Procedure(s) (LRB):  Right CRANIOTOMY, FOR SUBDURAL HEMATOMA EVACUATION  (Right) 4 Days Post-Op    Plan:     During this hospitalization, patient to be seen 4 x/week  "to address the identified rehab impairments via gait training, therapeutic activities, therapeutic exercises, neuromuscular re-education and progress toward the following goals:    · Plan of Care Expires:  01/04/20    Subjective     Chief Complaint: "It hurts where I hit my head", "I'm sorry, I have to go to the bathroom again"  Patient/Family Comments/goals: improve strength and activity tolerance, ambulate with therapy  Pain/Comfort:  · Pain Rating 1: (mild headache, did not rate)  · Location - Orientation 1: posterior  · Location 1: head  · Pain Addressed 1: Pre-medicate for activity, Reposition, Distraction  · Pain Rating Post-Intervention 1: (remained)      Objective:     Communicated with RN prior to session.  Patient found HOB elevated with arterial line, bed alarm, blood pressure cuff, peripheral IV, pulse ox (continuous), SCD, NG tube, wilson catheter upon PT entry to room.     General Precautions: Standard, aspiration, fall, diabetic, seizure, pureed diet   Orthopedic Precautions:N/A   Braces: N/A     Functional Mobility:    Bed Mobility  Rolling to R: minimum assistance   Supine to Sit:  minimum assistance    Transfers Sit to Stand:  minimum assistance to contact guard assist; performed 8 reps  Stand step transfer bed to chair, chair to commode, commode to chair: minimum assistance    Gait  Gait Distance: 8' x2 + 12' ft with hand held assist  Assistance Level: minimum assistance   Description: wide DAVID, short shuffling strides, decreased step length,decreased toe clearance, decreased hip flexion to progress LE, increased lateral weight shift. Facilitation for weight shift, cues for posture, cues for sequencing, assist with line management.      Sit to stand for lower extremity strengthening, muscular endurance, and to increase independence with transfers: 1 rep with UE assist, 5 reps without UE support, cues for set up and technique, contact guard assist- limited due to increased knee pain with " activity    Static standing marching with minimum assistance and hand held assist to improve anticipatory balance, weight shift, cues for eccentric leg lowering; 10 reps ea.      AM-PAC 6 CLICK MOBILITY  Turning over in bed (including adjusting bedclothes, sheets and blankets)?: 3  Sitting down on and standing up from a chair with arms (e.g., wheelchair, bedside commode, etc.): 3  Moving from lying on back to sitting on the side of the bed?: 3  Moving to and from a bed to a chair (including a wheelchair)?: 3  Need to walk in hospital room?: 3  Climbing 3-5 steps with a railing?: 1  Basic Mobility Total Score: 16       Therapeutic Activities and Exercises:   Transfer to bedside commode for BM, patient stood with minimum assistance for pericare. Performed transfer back to chair.   Patient and spouse educated on role of therapy, goals of session, benefits of out of bed mobility. Patient agreeable to mobilize with therapy.Discussed PT plan of care during hospitalization. Patient educated that they need to call for assistance to mobilize out of bed. Whiteboard updated as appropriate. Patient educated on how their diagnosis impacts their mobility within PT scope of practice.       Patient left up in chair with all lines intact, call button in reach and RN notified..    GOALS:   Multidisciplinary Problems     Physical Therapy Goals        Problem: Physical Therapy Goal    Goal Priority Disciplines Outcome Goal Variances Interventions   Physical Therapy Goal     PT, PT/OT Ongoing, Progressing     Description:  Goals to be met by: 2019     Patient will increase functional independence with mobility by performin. Supine to sit with Contact Guard Assistance  2. Sit to supine with Contact Guard Assistance  3. Sit to stand transfer with Stand-by Assistance  4. Bed to chair transfer with Minimal Assistance using least restrictive device or no AD. - Met   Bed to chair transfer with stand by assistance.   5. Gait   x 40 feet with Contact Guard Assistance using least restrictive device or no AD.   6. Stand for 3 minutes with Stand-by Assistance using  No AD to improve static standing balance and prepare for functional activities in standing.   7. Lower extremity exercise program x20 reps per handout, with independence to improve strength and activity tolerance.                       Time Tracking:     PT Received On: 12/05/19  PT Start Time: 1130     PT Stop Time: 1208  PT Total Time (min): 38 min     Billable Minutes: Gait Training 10 and Therapeutic Activity 28    Treatment Type: Treatment  PT/PTA: PT     PTA Visit Number: 0     Monika Ulloa, PT  12/05/2019

## 2019-12-06 VITALS
WEIGHT: 225 LBS | OXYGEN SATURATION: 97 % | HEART RATE: 88 BPM | DIASTOLIC BLOOD PRESSURE: 66 MMHG | SYSTOLIC BLOOD PRESSURE: 120 MMHG | RESPIRATION RATE: 18 BRPM | TEMPERATURE: 98 F | HEIGHT: 72 IN | BODY MASS INDEX: 30.48 KG/M2

## 2019-12-06 DIAGNOSIS — S06.5XAA SDH (SUBDURAL HEMATOMA): Primary | ICD-10-CM

## 2019-12-06 LAB
ALBUMIN SERPL BCP-MCNC: 3.2 G/DL (ref 3.5–5.2)
ALP SERPL-CCNC: 68 U/L (ref 55–135)
ALT SERPL W/O P-5'-P-CCNC: 37 U/L (ref 10–44)
ANION GAP SERPL CALC-SCNC: 8 MMOL/L (ref 8–16)
AST SERPL-CCNC: 42 U/L (ref 10–40)
BILIRUB SERPL-MCNC: 0.5 MG/DL (ref 0.1–1)
BUN SERPL-MCNC: 12 MG/DL (ref 8–23)
CALCIUM SERPL-MCNC: 9.3 MG/DL (ref 8.7–10.5)
CHLORIDE SERPL-SCNC: 104 MMOL/L (ref 95–110)
CK SERPL-CCNC: 521 U/L (ref 20–200)
CO2 SERPL-SCNC: 26 MMOL/L (ref 23–29)
CREAT SERPL-MCNC: 0.7 MG/DL (ref 0.5–1.4)
EST. GFR  (AFRICAN AMERICAN): >60 ML/MIN/1.73 M^2
EST. GFR  (NON AFRICAN AMERICAN): >60 ML/MIN/1.73 M^2
GLUCOSE SERPL-MCNC: 128 MG/DL (ref 70–110)
MAGNESIUM SERPL-MCNC: 2.1 MG/DL (ref 1.6–2.6)
PHOSPHATE SERPL-MCNC: 3.7 MG/DL (ref 2.7–4.5)
POCT GLUCOSE: 125 MG/DL (ref 70–110)
POTASSIUM SERPL-SCNC: 3.9 MMOL/L (ref 3.5–5.1)
PROT SERPL-MCNC: 7.1 G/DL (ref 6–8.4)
SODIUM SERPL-SCNC: 138 MMOL/L (ref 136–145)

## 2019-12-06 PROCEDURE — 99238 PR HOSPITAL DISCHARGE DAY,<30 MIN: ICD-10-PCS | Mod: ,,, | Performed by: PHYSICIAN ASSISTANT

## 2019-12-06 PROCEDURE — 36415 COLL VENOUS BLD VENIPUNCTURE: CPT

## 2019-12-06 PROCEDURE — 25000003 PHARM REV CODE 250: Performed by: NEUROLOGICAL SURGERY

## 2019-12-06 PROCEDURE — 94668 MNPJ CHEST WALL SBSQ: CPT

## 2019-12-06 PROCEDURE — 82550 ASSAY OF CK (CPK): CPT

## 2019-12-06 PROCEDURE — 94761 N-INVAS EAR/PLS OXIMETRY MLT: CPT

## 2019-12-06 PROCEDURE — 63600175 PHARM REV CODE 636 W HCPCS: Performed by: STUDENT IN AN ORGANIZED HEALTH CARE EDUCATION/TRAINING PROGRAM

## 2019-12-06 PROCEDURE — 99024 POSTOP FOLLOW-UP VISIT: CPT | Mod: POP,,, | Performed by: PHYSICIAN ASSISTANT

## 2019-12-06 PROCEDURE — 99238 HOSP IP/OBS DSCHRG MGMT 30/<: CPT | Mod: ,,, | Performed by: PHYSICIAN ASSISTANT

## 2019-12-06 PROCEDURE — 25000003 PHARM REV CODE 250: Performed by: PSYCHIATRY & NEUROLOGY

## 2019-12-06 PROCEDURE — 92507 TX SP LANG VOICE COMM INDIV: CPT

## 2019-12-06 PROCEDURE — 99024 PR POST-OP FOLLOW-UP VISIT: ICD-10-PCS | Mod: POP,,, | Performed by: PHYSICIAN ASSISTANT

## 2019-12-06 PROCEDURE — 80053 COMPREHEN METABOLIC PANEL: CPT

## 2019-12-06 PROCEDURE — 83735 ASSAY OF MAGNESIUM: CPT

## 2019-12-06 PROCEDURE — 97535 SELF CARE MNGMENT TRAINING: CPT

## 2019-12-06 PROCEDURE — 84100 ASSAY OF PHOSPHORUS: CPT

## 2019-12-06 RX ORDER — HYDROCODONE BITARTRATE AND ACETAMINOPHEN 5; 325 MG/1; MG/1
2 TABLET ORAL EVERY 6 HOURS PRN
Qty: 56 TABLET | Refills: 0 | Status: SHIPPED | OUTPATIENT
Start: 2019-12-06 | End: 2019-12-19 | Stop reason: SDUPTHER

## 2019-12-06 RX ORDER — LEVETIRACETAM 500 MG/1
500 TABLET ORAL 2 TIMES DAILY
Qty: 60 TABLET | Refills: 6 | Status: SHIPPED | OUTPATIENT
Start: 2019-12-06 | End: 2022-09-26

## 2019-12-06 RX ORDER — DILTIAZEM HYDROCHLORIDE 30 MG/1
30 TABLET, FILM COATED ORAL EVERY 6 HOURS
Qty: 120 TABLET | Refills: 2 | Status: SHIPPED | OUTPATIENT
Start: 2019-12-06

## 2019-12-06 RX ORDER — THIAMINE HCL 100 MG
100 TABLET ORAL DAILY
Status: DISCONTINUED | OUTPATIENT
Start: 2019-12-06 | End: 2019-12-06 | Stop reason: HOSPADM

## 2019-12-06 RX ORDER — SODIUM CHLORIDE 9 MG/ML
INJECTION, SOLUTION INTRAVENOUS CONTINUOUS
Status: DISCONTINUED | OUTPATIENT
Start: 2019-12-06 | End: 2019-12-06

## 2019-12-06 RX ORDER — LANOLIN ALCOHOL/MO/W.PET/CERES
100 CREAM (GRAM) TOPICAL DAILY
Qty: 30 TABLET | Refills: 2 | Status: SHIPPED | OUTPATIENT
Start: 2019-12-06

## 2019-12-06 RX ADMIN — SODIUM CHLORIDE: 0.9 INJECTION, SOLUTION INTRAVENOUS at 05:12

## 2019-12-06 RX ADMIN — HYDROCODONE BITARTRATE AND ACETAMINOPHEN 2 TABLET: 5; 325 TABLET ORAL at 03:12

## 2019-12-06 RX ADMIN — AMLODIPINE BESYLATE AND BENAZEPRIL HYDROCHLORIDE 1 CAPSULE: 5; 10 CAPSULE ORAL at 09:12

## 2019-12-06 RX ADMIN — LEVOTHYROXINE SODIUM 100 MCG: 100 TABLET ORAL at 05:12

## 2019-12-06 RX ADMIN — DILTIAZEM HYDROCHLORIDE 30 MG: 30 TABLET, FILM COATED ORAL at 05:12

## 2019-12-06 RX ADMIN — SILODOSIN 4 MG: 4 CAPSULE ORAL at 09:12

## 2019-12-06 RX ADMIN — METOPROLOL TARTRATE 25 MG: 25 TABLET ORAL at 09:12

## 2019-12-06 RX ADMIN — HEPARIN SODIUM 5000 UNITS: 5000 INJECTION, SOLUTION INTRAVENOUS; SUBCUTANEOUS at 05:12

## 2019-12-06 RX ADMIN — HYDROCODONE BITARTRATE AND ACETAMINOPHEN 2 TABLET: 5; 325 TABLET ORAL at 09:12

## 2019-12-06 RX ADMIN — POLYETHYLENE GLYCOL 3350 17 G: 17 POWDER, FOR SOLUTION ORAL at 09:12

## 2019-12-06 RX ADMIN — LEVETIRACETAM 500 MG: 500 TABLET ORAL at 09:12

## 2019-12-06 RX ADMIN — DILTIAZEM HYDROCHLORIDE 30 MG: 30 TABLET, FILM COATED ORAL at 12:12

## 2019-12-06 RX ADMIN — Medication 100 MG: at 03:12

## 2019-12-06 RX ADMIN — HEPARIN SODIUM 5000 UNITS: 5000 INJECTION, SOLUTION INTRAVENOUS; SUBCUTANEOUS at 03:12

## 2019-12-06 RX ADMIN — AMANTADINE HYDROCHLORIDE 100 MG: 50 SOLUTION ORAL at 09:12

## 2019-12-06 RX ADMIN — DILTIAZEM HYDROCHLORIDE 30 MG: 30 TABLET, FILM COATED ORAL at 03:12

## 2019-12-06 NOTE — PLAN OF CARE
POC reviewed with pt. Verbalized understanding. VSS. Neuro exam remains unchanged. Pain controlled with PRN medication. Fall precautions maintained. Pt advised to call staff for assistance, call light is within reach. Will continue to monitor, with all safety measures met.

## 2019-12-06 NOTE — DISCHARGE INSTRUCTIONS
Neurosurgery Patient Information  -No driving until released by Dr. Toledo  -Do not take any OTC products containing acetaminophen at the same time as you take your narcotic pain medication. Medications that may contain acetaminophen include but are not limited to: Excedrin and other headache medications, arthritis medications, cold and sinus medications, etc. Please review the list of active ingredients in any OTC medication prior to taking it.  -Do not take any Aspirin or Aspirin-containing products for 2 weeks after surgery.  -Do not take any Aleve, Naprosyn, Naproxen, Ibuprofen, Advil or any other nonsteroidal anti-inflammatory drug (NSAID) for 2 weeks after surgery.  -Do not take any herbal supplements for 2 weeks after surgery.   -Do not consume any alcoholic beverages until released by your neurosurgeon  -Do not perform any excessive bending over or leaning forward as this is a fall hazard.  -Do not perform any heavy lifting or lifting more than 10 lbs from the ground level as this is a fall hazard.    Contact the Neurosurgery Office immediately if:  If you begin to notice any neurologic changes such as:           -Sudden onset of lethargy or sleepiness           -Sudden confusion, trouble speaking, or understanding            -Sudden trouble seeing in one or both eyes            -Sudden trouble walking, dizziness, loss of coordination            -Sudden severe headache with no known cause            -Sudden onset of numbness or weakness     Wound Care:  Keep your incision open to air. You may shower on the 2nd day after your surgery. Keep the incision clean and dry at all times. Please cover the incision with saran wrap or other occlusive dressing while showering and REMOVE once you have completed taking your shower. Do not allow the force of water to hit the incision. If the incision gets damp, gently pat it dry. Do not rub or scrub the incision. You cannot take a bath/swim/submerge the incision until 8 weeks  after surgery.    Apply Bacitracin ointment (over the counter) to incision twice daily.    Call your doctor or go to the Emergency Room for any signs of infection including: increased redness, drainage, pain or fever (temperature greater than or equal to 101.4).       Miscellaneous:  -You were started on a medication to prevent seizures (Keppra) while in the hospital. Please continue to take this medication as instructed.   -Follow up with Dr. Toledo in 2 weeks for a wound check. Appointment will be mailed to you.    -Your experienced an episode of Atrial Fibrillation (Afib) with elevated heart rate during your hospital admission. You were started on a new medication, diltiazem, for control of your heart rate. Please continue to take this medication as prescribed. You will need to follow up with a Cardiologist within 1-2 weeks for continued management and medication refills. A referral to Cardiology has been placed for you and an appointment has been scheduled. The Neurosurgery Clinic will not provide further refills for this medication.           [facility] Neurosurgery Office: [phone]              [facility]: [phone]   [place] [facility] Office: [phone]

## 2019-12-06 NOTE — PLAN OF CARE
Pt with good progress towards goals     Keshia Keys MS, CCC-SLP  Speech Language Pathologist  Pager: [phone]  Date 12/6/2019

## 2019-12-06 NOTE — PT/OT/SLP PROGRESS
Speech Language Pathology Treatment    Patient Name:  Jermaine Linda   MRN:  [id number]  Admitting Diagnosis: SDH (subdural hematoma)    Recommendations:                 General Recommendations:  Speech/language therapy and Cognitive-linguistic therapy  Diet recommendations:  Regular, Liquid Diet Level: Thin   Aspiration Precautions: Standard aspiration precautions   General Precautions: Standard,    Communication strategies:  none    Subjective     Pt awake and working with OT upon arrival     Pain/Comfort:  · Pain Rating 1: 0/10  · Pain Rating Post-Intervention 1: 0/10    Objective:     Has the patient been evaluated by SLP for swallowing?   Yes  Keep patient NPO? No   Current Respiratory Status:    Room air     Pt reports has been pleased with diet advancement and is not interested and additional PO trials at this time. Pt able to advance to regular solids and thin liquids per medical team. There is not oral pharyngeal issue limiting diet. Pt overall cognitive status has continued to steadily improve and he would be safe with a regular tray.     Pt overall remains with flat affect and monotone speech but is continuing to improve daily. Pt response time remains mildly delayed. Pt participated in divergent category naming tasks able to name an average of 6 items per category independently and increased to 10 items with min semantic cues. Pt demonstrated delayed recall w/ 85% acc independently. Pt with mild left sided inattention/neglect completing clock drawing task and missed a number in left upper quadrant when brought to his attention able to self-correct. SLP discussed with pt and spouse pt continues to make good progress goals and ongoing areas warranting improvement.      Assessment:     Jermaine Linda is a 64 y.o. male with an SLP diagnosis of Cognitive-Linguistic Impairment.      Goals:   Multidisciplinary Problems     SLP Goals        Problem: SLP Goal    Goal Priority Disciplines Outcome   SLP Goal      SLP Ongoing, Progressing   Description:  Speech Language Pathology Goals  Goals expected to be met by 12/10    1. Pt will tolerate diet of thin liquids and purees without overt clinical signs of aspiration   2. Pt will participate in trials of soft solids within speech therapy sessions to help determine least restrictive diet   3. Pt will demonstrate orientation to self, place, situation , family members, date w/ min cues   4. Pt will complete problem solving skills w/ 75 % acc to enhance safety awareness   5. Pt will generate 5 items per category to enhance organizations skills   6. Pt will follow single step directions w/ 80% acc w/ min cues to enhance comprehension skills   7.  Pt will participate in ongoing assessment of speech language and cognitive linguistic skills to help rule out deficits and determine therapeutic plan of care                         Plan:     · Patient to be seen:  4 x/week   · Plan of Care expires:     · Plan of Care reviewed with:  patient, spouse   · SLP Follow-Up:  Yes       Discharge recommendations:  home with home health   Barriers to Discharge:  None    Time Tracking:     SLP Treatment Date:   12/06/19  Speech Start Time:  1012  Speech Stop Time:  1035     Speech Total Time (min):  23 min    Billable Minutes: Treatment Swallowing Dysfunction 23    Keshia Keys CCC-SLP  12/06/2019

## 2019-12-06 NOTE — SUBJECTIVE & OBJECTIVE
Interval History: NAEON. Stepped down from ICU. Vitals and labs stable. Episode of Afib with RVR on 12/4 resolved, EKG yesterday with NSR. HR controlled on current regimen. CPK continues to trend down. Resp Cx from 12/2 growing Serratia, pan-sensitive, likely colonized, no need to tx as pt asymptomatic. Family members at bedside. Neuro exam stable. AAOx4. No further agitation overnight, pt reports he slept well. Postop pain/headaches controlled. Denies weakness, paresthesias, speech difficulty, and gait instability. Tolerating PO diet, SLP upgraded to regular diet. PT/OT recs updated to . Medically stable for discharge home with family today.    Medications:  Continuous Infusions:  Scheduled Meds:   amlodipine-benazepril 5-10 mg  1 capsule Oral Daily    bisacodyl  10 mg Rectal Daily    diltiaZEM  30 mg Oral Q6H    heparin (porcine)  5,000 Units Subcutaneous Q8H    levETIRAcetam  500 mg Oral BID    levothyroxine  100 mcg Oral Before breakfast    metoprolol tartrate  25 mg Oral BID    polyethylene glycol  17 g Oral Daily    thiamine  100 mg Oral Daily     PRN Meds:acetaminophen, acetaminophen, albuterol-ipratropium, clonazePAM, Dextrose 10% Bolus, glucagon (human recombinant), hydrALAZINE, HYDROcodone-acetaminophen, insulin aspart U-100, labetalol, OLANZapine, ondansetron, sodium chloride 0.9%     Review of Systems  Objective:     Weight: 102.1 kg (225 lb)  Body mass index is 30.52 kg/m².  Vital Signs (Most Recent):  Temp: 98.4 °F (36.9 °C) (12/06/19 1547)  Pulse: 88 (12/06/19 1547)  Resp: 18 (12/06/19 0756)  BP: 120/66 (12/06/19 1547)  SpO2: 97 % (12/06/19 1547) Vital Signs (24h Range):  Temp:  [98 °F (36.7 °C)-99.5 °F (37.5 °C)] 98.4 °F (36.9 °C)  Pulse:  [75-95] 88  Resp:  [18] 18  SpO2:  [93 %-98 %] 97 %  BP: ()/(55-75) 120/66     Date 12/06/19 0700 - 12/07/19 0659   Shift 1608-2648 [id number] 1300-2953 24 Hour Total   INTAKE   P.O. 710   710   Shift Total(mL/kg) 710(7)   710(7)   OUTPUT   Shift  Total(mL/kg)       Weight (kg) 102.1 102.1 102.1 102.1                        Neurosurgery Physical Exam    General: well developed, well nourished, no distress.   Head: normocephalic  Neck: No tracheal deviation. No palpable masses. Full ROM.   Neurologic: Alert and oriented. Thought content appropriate.  GCS: Motor: 6/Verbal: 5/Eyes: 4 GCS Total: 15  Mental Status: Awake, Alert, Oriented x 4 (year: 1920, but then immediately corrects to 2019)  Language: No aphasia  Speech: No dysarthria  Cranial nerves: face symmetric, tongue midline, CN II-XII grossly intact.   Eyes: pupils equal, round, reactive to light with accomodation, EOMI.   Ears: No drainage.   Pulmonary: normal respirations, no signs of respiratory distress  Abdomen: soft, non-distended, not tender to palpation    Sensory: intact to light touch throughout  Motor Strength: Moves all extremities spontaneously with good tone.  Full strength upper and lower extremities. No abnormal movements seen.     DTR's - 2 + and symmetric in UE and LE  Pronator Drift: no drift noted  Finger-to-nose: Intact bilaterally  Clonus: absent  Babinski: absent  Vascular: Pulses 2+ and symmetric radial and dorsalis pedis. No LE edema.   Skin: Skin is warm, dry and intact.  Gait: normal    Cranial Incision: c/d/I with skin edges well approximated with staples. No surrounding erythema or edema. No drainage from incision. No palpable hematoma or underlying fluid collection.        Significant Labs:  Recent Labs   Lab 12/05/19  0302 12/06/19  0405   * 128*    138   K 4.0 3.9    104   CO2 24 26   BUN 10 12   CREATININE 0.6 0.7   CALCIUM 8.9 9.3   MG 2.0 2.1     Recent Labs   Lab 12/05/19  0302   WBC 8.31   HGB 10.2*   HCT 31.3*        No results for input(s): LABPT, INR, APTT in the last 48 hours.  Microbiology Results (last 7 days)     Procedure Component Value Units Date/Time    Blood culture [[id number]] Collected:  12/02/19 0824    Order Status:  Completed  Specimen:  Blood from Peripheral, Hand, Right Updated:  12/06/19 1212     Blood Culture, Routine No Growth to date      No Growth to date      No Growth to date      No Growth to date      No Growth to date    Culture, Respiratory with Gram Stain [[id number]]  (Abnormal)  (Susceptibility) Collected:  12/02/19 0825    Order Status:  Completed Specimen:  Respiratory from Tracheal Aspirate Updated:  12/04/19 1344     Respiratory Culture No S aureus or Pseudomonas isolated.      SERRATIA MARCESCENS  Many       Gram Stain (Respiratory) <10 epithelial cells per low power field.     Gram Stain (Respiratory) No WBC's     Gram Stain (Respiratory) Moderate Gram negative rods        All pertinent labs from the last 24 hours have been reviewed.    Significant Diagnostics:  I have reviewed and interpreted all pertinent imaging results/findings within the past 24 hours.

## 2019-12-06 NOTE — PT/OT/SLP PROGRESS
April 11, 2018      Re:  Abdias Hinkle          1945        To: [facility] (sp?)    Fax: [phone]    Please obtain a urinalysis and urine culture.     Diagnosis: Confusion  (ICD-10 R41.0)    If you have any questions, please do not hesitate to contact me.    Sincerely,           Ra Mcghee MD  [address] [facility], [address]  [address]  Phone: [phone]  Fax:  [phone]     "Occupational Therapy   Treatment    Name: Jermaine Linda  MRN: [id number]  Admitting Diagnosis:  SDH (subdural hematoma)  5 Days Post-Op    Recommendations:     Discharge Recommendations: home with home health  Discharge Equipment Recommendations:  none  Barriers to discharge:  None    Assessment:     Jermaine Linda is a 64 y.o. male with a medical diagnosis of SDH (subdural hematoma).  He presents with gains towards OT goals and overall insight into current situation. He demo good family support for d/c planning and would greatly benefit from therapy within natural environment at this time. Performance deficits affecting function are weakness, impaired endurance, impaired self care skills, impaired functional mobilty, gait instability, impaired balance, pain, decreased upper extremity function, decreased lower extremity function, impaired cardiopulmonary response to activity, impaired skin.     Rehab Prognosis:  Good; patient would benefit from acute skilled OT services to address these deficits and reach maximum level of function.       Plan:     Patient to be seen 4 x/week to address the above listed problems via self-care/home management, therapeutic activities, therapeutic exercises, neuromuscular re-education, cognitive retraining  · Plan of Care Expires: 01/03/20  · Plan of Care Reviewed with: patient, spouse    Subjective   Pt reported "I really do feel so much better"  Pain/Comfort:  · Pain Rating 1: 0/10  · Pain Rating Post-Intervention 1: 0/10    Objective:     Communicated with: RN prior to session.  Patient found left sidelying with telemetry,  peripheral IV,  upon OT entry to room.    General Precautions: Standard, aspiration, fall, diabetic, seizure  Orthopedic Precautions:N/A     Occupational Performance:     Bed Mobility:    · Patient completed Rolling/Turning to Left with  contact guard assistance  · Patient completed Supine to Sit with stand by assistance     Functional " Mobility/Transfers:  · Patient completed Sit <> Stand Transfer with stand by assistance  with  no assistive device   · Patient completed Bed <> Chair Transfer using Step Transfer technique with stand by assistance with no assistive device  · Functional Mobility: SBA at household distances x2 trials    Activities of Daily Living:  · Grooming: stand by assistance standing at sink for oral care with setup; one cue for initiaiton of task  · Requiring occasional LE support to aid in dynamic control   · 1 cue for L UQ item find  · Upper Body Dressing: stand by assistance EOB to theresa and don gown    [facility] 6 Click ADL: 20    Treatment & Education:  -Pt alert and oriented x4; agreeable to therapy session  -100% accuracy 7/7 days of week in timely manner; able to sequence 12/12 months of the year in descending order  -100% accuracy on ID of ADL items  -able to verbalized safety measures for acute care setting (ie: call button, staff assist)  -contacts in for session; able to complete reading task 100% accuracy  -Communication board updated; questions/concerns addressed within OT scope of practice      Patient left up in chair with all lines intact, call button in reach, , case mgmt, PT notified and wife presentEducation:      GOALS:   Multidisciplinary Problems     Occupational Therapy Goals        Problem: Occupational Therapy Goal    Goal Priority Disciplines Outcome Interventions   Occupational Therapy Goal     OT, PT/OT Ongoing, Progressing    Description:  Goals to be met by: 12/18     Patient will increase functional independence with ADLs by performing:    Pt will report daily orientation x4 with added time and 0 added cues. met  Supine<>sit with CGA and HOB flat. met  UE Dressing while seated EOB with Minimal Assistance. Met  *revised: with set up and mod(I)  LB Dressing (donning pull up brief) with Minimal Assistance.   Grooming while standing with Minimal Assistance. Met  *Revised: standing at sink with  set up and supervision  Toileting from toilet with SBA for hygiene and clothing management.   Toilet transfer to toilet with SBA.                        Time Tracking:     OT Date of Treatment: 12/06/19  OT Start Time: 1008  OT Stop Time: 1038  OT Total Time (min): 30 min    Billable Minutes:Self Care/Home Management 25    GENA Huston  12/6/2019

## 2019-12-06 NOTE — PLAN OF CARE
Discharge recommendations changed to home health on this date. Discussed with therapies and case mgmt on this date. GENA Huston 12/6/2019   Problem: Occupational Therapy Goal  Goal: Occupational Therapy Goal  Description  Goals to be met by: 12/18     Patient will increase functional independence with ADLs by performing:    Pt will report daily orientation x4 with added time and 0 added cues. met  Supine<>sit with CGA and HOB flat. met  UE Dressing while seated EOB with Minimal Assistance. Met  *revised: with set up and mod(I)  LB Dressing (donning pull up brief) with Minimal Assistance.   Grooming while standing with Minimal Assistance. Met  *Revised: standing at sink with set up and supervision  Toileting from toilet with SBA for hygiene and clothing management.   Toilet transfer to toilet with SBA.       Outcome: Ongoing, Progressing

## 2019-12-07 LAB — BACTERIA BLD CULT: NORMAL

## 2019-12-07 NOTE — DISCHARGE SUMMARY
[facility]  Neurosurgery  Discharge Summary      Patient Name: Jermaine Linda  MRN: [id number]  Admission Date: 12/1/2019  Hospital Length of Stay: 5 days  Discharge Date and Time:  12/06/2019 6:14 PM  Attending Physician: Niles Toledo DO   Discharging Provider: Tamika Harvey PA-C  Primary Care Provider: [facility]    HPI:   64yom w pmh of HTN, HLD, DM2, thyroid disease, anxiety, and hx of prostate cancer presents after mechanical fall getting out of car and hitting head against concrete found at [facility] ED to have R SDH. pt transferred to [facility] and repeat CTH showed worsening SDH with icreased mass effect, and decline in mental status in ED.    Procedure(s) (LRB):  Right CRANIOTOMY, FOR SUBDURAL HEMATOMA EVACUATION  (Right)     Hospital Course: 12/2: 1 day s/p right craniotomy for SDH. Remains intubated. Febrile to 100.8 overnight, cultures sent.  12/3:  No acute event overnight.  He remains on EEG and propofol.  No clinical seizures.  Last follow-up CT satisfactory.  Exam stable  12/5:  No acute event overnight.  Exam is stable.  Had some agitation on Precedex.  12/6: NAEON. Stepped down from ICU. Vitals and labs stable. Episode of Afib with RVR on 12/4 resolved, EKG yesterday with NSR. HR controlled on current regimen. CPK continues to trend down. Resp Cx from 12/2 growing Serratia, pan-sensitive, likely colonized, no need to tx as pt asymptomatic. Family members at bedside. Neuro exam stable. AAOx4. No further agitation overnight, pt reports he slept well. Postop pain/headaches controlled. Denies weakness, paresthesias, speech difficulty, and gait instability. Tolerating PO diet, SLP upgraded to regular diet. PT/OT recs updated to . Medically stable for discharge home with family today.    Consults:   Consults (From admission, onward)        Status Ordering Provider     Inpatient consult to Neuro Critical Care  Once     Provider:  (Not yet assigned)    Acknowledged  VANNA HADDAD     Inpatient consult to Neurosurgery  Once     Provider:  (Not yet assigned)    Acknowledged VANNA HADDAD     Inpatient consult to Registered Dietitian/Nutritionist  Once     Provider:  (Not yet assigned)    Completed EMMANUEL CHÁVEZ     IP consult to case management/social work  Once     Provider:  (Not yet assigned)    Acknowledged ALEJANDRO CAMPOS          Significant Diagnostic Studies: Labs:   BMP:   Recent Labs   Lab 12/05/19  0302 12/06/19  0405   * 128*    138   K 4.0 3.9    104   CO2 24 26   BUN 10 12   CREATININE 0.6 0.7   CALCIUM 8.9 9.3   MG 2.0 2.1   , CMP   Recent Labs   Lab 12/05/19  0302 12/06/19  0405    138   K 4.0 3.9    104   CO2 24 26   * 128*   BUN 10 12   CREATININE 0.6 0.7   CALCIUM 8.9 9.3   PROT 6.8 7.1   ALBUMIN 3.0* 3.2*   BILITOT 0.4 0.5   ALKPHOS 83 68   AST 54* 42*   ALT 35 37   ANIONGAP 9 8   ESTGFRAFRICA >60.0 >60.0   EGFRNONAA >60.0 >60.0   , CBC   Recent Labs   Lab 12/05/19  0302   WBC 8.31   HGB 10.2*   HCT 31.3*      , INR   Lab Results   Component Value Date    INR 1.0 12/02/2019    INR 1.0 12/01/2019   , Lipid Panel   Lab Results   Component Value Date    CHOL 165 12/01/2019    HDL 39 (L) 12/01/2019    LDLCALC 88.4 12/01/2019    TRIG 188 (H) 12/01/2019    CHOLHDL 23.6 12/01/2019   , Troponin   Recent Labs   Lab 12/01/19  0404   TROPONINI <0.006   , A1C:   Recent Labs   Lab 12/01/19  0404   HGBA1C 6.3*    and All labs within the past 24 hours have been reviewed  Microbiology:   Sputum Culture   Lab Results   Component Value Date    GSRESP <10 epithelial cells per low power field. 12/02/2019    GSRESP No WBC's 12/02/2019    GSRESP Moderate Gram negative rods 12/02/2019    RESPIRATORYC No S aureus or Pseudomonas isolated. 12/02/2019    RESPIRATORYC SERRATIA MARCESCENS  Many   (A) 12/02/2019     Radiology: CT Cervical Spine, CT Head, X-Ray Chest, US BLE    Pending Diagnostic Studies:     None         Final Active Diagnoses:    Diagnosis Date Noted POA    PRINCIPAL PROBLEM:  SDH (subdural hematoma) [S06.5X9A] 12/01/2019 Yes    Urinary retention [R33.9] 12/04/2019 Yes    Seizure [R56.9]  Yes    Acute respiratory failure with hypoxia and hypercapnia [J96.01, J96.02]  Yes    Seizure prophylaxis [Z29.8] 12/01/2019 Not Applicable    Essential hypertension [I10] 12/01/2019 Yes    Hyperlipidemia [E78.5] 12/01/2019 Yes    Diabetes mellitus [E11.9] 12/01/2019 Yes    Hypothyroidism [E03.9] 12/01/2019 Yes    Subdural hemorrhage [I62.00] 12/01/2019 Yes    Brain compression [G93.5] 12/01/2019 Yes    Hypovolemia [E86.1] 12/01/2019 Yes    Non-traumatic rhabdomyolysis [M62.82] 12/01/2019 Yes    Hypokalemia [E87.6] 12/01/2019 Yes    Elevated hemoglobin A1c [R73.09] 12/01/2019 Yes    Fall [W19.XXXA] 12/01/2019 Yes    TBI (traumatic brain injury) [S06.9X9A] 12/01/2019 Yes    Transaminase or LDH elevation [R74.0] 12/01/2019 Yes    Alcohol intoxication in active alcoholic with complication [F10.229] 12/01/2019 Yes      Problems Resolved During this Admission:      Discharged Condition: stable    Disposition: Home-Health Care c    Follow Up:  -2 weeks in Neurosurgery clinic for wound check/staple removal  -6 weeks with Dr. Toledo with Neurosurgery with CT Head    Follow-up Information     Per Lucero MD In 1 week.    Specialty:  Cardiology  Why:  Episode of new-onset Afib with RVR while admitted to hospital  Contact information:  [address]  [facility] [address]  [phone]                 Patient Instructions:      Ambulatory referral to Home Health   Referral Priority: Routine Referral Type: Home Health   Referral Reason: Specialty Services Required   Requested Specialty: Home Health Services   Number of Visits Requested: 1     Ambulatory Referral to Cardiology   Referral Priority: Routine Referral Type: Consultation   Referral Reason: Specialty Services Required   Requested Specialty: Cardiology    Number of Visits Requested: 1     Notify your health care provider if you experience any of the following:  temperature >100.4     Notify your health care provider if you experience any of the following:  persistent nausea and vomiting or diarrhea     Notify your health care provider if you experience any of the following:  severe uncontrolled pain     Notify your health care provider if you experience any of the following:  redness, tenderness, or signs of infection (pain, swelling, redness, odor or green/yellow discharge around incision site)     Notify your health care provider if you experience any of the following:  difficulty breathing or increased cough     Notify your health care provider if you experience any of the following:  severe persistent headache     Notify your health care provider if you experience any of the following:  worsening rash     Notify your health care provider if you experience any of the following:  persistent dizziness, light-headedness, or visual disturbances     Notify your health care provider if you experience any of the following:  increased confusion or weakness     Activity as tolerated     Medications:  Reconciled Home Medications:      Medication List      START taking these medications    diltiaZEM 30 MG tablet  Commonly known as:  CARDIZEM  Take 1 tablet (30 mg total) by mouth every 6 (six) hours.     HYDROcodone-acetaminophen 5-325 mg per tablet  Commonly known as:  NORCO  Take 2 tablets by mouth every 6 (six) hours as needed for Pain.  Replaces:  HYDROcodone-acetaminophen 7.5-325 mg per tablet     levETIRAcetam 500 MG Tab  Commonly known as:  KEPPRA  Take 1 tablet (500 mg total) by mouth 2 (two) times daily.     thiamine 100 MG tablet  Take 1 tablet (100 mg total) by mouth once daily.        CONTINUE taking these medications    amlodipine-benazepril 5-10 mg 5-10 mg per capsule  Commonly known as:  LOTREL  Take 1 capsule by mouth once daily.     ARIPiprazole 2 MG  Tab  Commonly known as:  ABILIFY  Take 2 mg by mouth 2 (two) times daily.     atorvastatin 80 MG tablet  Commonly known as:  LIPITOR  Take 40 mg by mouth once daily.     bethanechol 10 MG Tab  Commonly known as:  URECHOLINE  Take 50 mg by mouth 3 (three) times daily.     clonazePAM 1 MG tablet  Commonly known as:  KLONOPIN  Take 1 mg by mouth 2 (two) times daily as needed for Anxiety.     escitalopram oxalate 20 MG tablet  Commonly known as:  LEXAPRO  Take 40 mg by mouth once daily.     fenofibrate 150 mg Cap  Take 145 mg by mouth once daily.     Glucophage 500 MG tablet  Generic drug:  metFORMIN  Take 500 mg by mouth 2 (two) times daily with meals.     imipramine 50 MG tablet  Commonly known as:  TOFRANIL  Take 100 mg by mouth every evening.     levothyroxine 100 MCG tablet  Commonly known as:  SYNTHROID  Take 100 mcg by mouth once daily.     metoprolol succinate 50 MG 24 hr tablet  Commonly known as:  TOPROL-XL  Take 50 mg by mouth once daily.     Singulair 10 mg tablet  Generic drug:  montelukast  Take 10 mg by mouth every evening.     tiZANidine 4 MG tablet  Commonly known as:  ZANAFLEX  Take 4 mg by mouth every 12 (twelve) hours as needed.        STOP taking these medications    HYDROcodone-acetaminophen 7.5-325 mg per tablet  Commonly known as:  NORCO  Replaced by:  HYDROcodone-acetaminophen 5-325 mg per tablet     naproxen 500 MG tablet  Commonly known as:  NAPROSYN            Tamika Harvey PA-C  Neurosurgery  [facility]

## 2019-12-07 NOTE — ASSESSMENT & PLAN NOTE
64yom w pmh of HTN, HLD, DM2, thyroid disease, anxiety, and hx of prostate cancer presents after mechanical fall getting out of car and hitting head against concrete found at [facility] ED to have R SDH. pt transferred to [facility] and repeat CTH showed worsening SDH with icreased mass effect, and decline in mental status in ED. Now s/p right craniotomy for SDH evacuation (12/1):    -Neurologically intact on exam  -Stepped down from ICU yesterday, doing well today with resolution of previous agitation  -Last follow-up CTH on 12/3 post-drain removal satisfactory  -Incision clean dry intact  -Postop Pain: Controlled. Continue Norco 5mg q6h PRN.  -Seizure Prevention: EEG 12/1 negative for seizure activity. Continue Keppra 500mg BID.  -Essential HTN: Continue home metoprolol and amlodipine-benazepril  -Paroxysmal Afib, new-onset: Pt had episode of Afib with RVR on 12/4, replaced with NSR on EKG 12/5. Denies any h/o previous Afib. Started on diltiazem 30mg q6h with rate currently controlled. Continue diltiazem on discharge. Instructed pt and family to f/u with Cardiologist in 1 week for continued evaluation and management, voiced understanding. Referral placed an appt scheduled, although family states they have a Cardiologist they will see, explained they may cancel appt here if that is the case.  -Rhabdomyolysis: CPK continues to trend down, 521 today from 1113 yesterday. Pt asymptomatic, UA negative. Instructed to f/u with PCP/Cardiology after discharge with repeat labs for further monitoring/management, voiced understanding.  -Hypothyroidism: Continue home synthroid  -H/O EtOH Abuse: No signs of DT. Continue daily thiamine supplements  -Overflow Incontinence: Pt voiding spontaneously post-Escudero removal. Restart home bethanechol on discharge.  -Nutrition: Tolerating dysphagia diet, upgraded to regular diet per SLP recs  -DVT prophylaxis: ROB's, SCD's, SQH while inpatient  -Bowel regimen: senna and miralax as  needed  -Atelectasis prevention: IS hourly while awake, PT/OT, OOB > 6 hours per day  -Follow up in Neurosurgery clinic in 2 weeks for a wound check  -Follow up with Dr. Toledo in 6 weeks with repeat CTH  -Discharge instructions given verbally to patient/family. All of their questions were answered. They were encouraged to call the clinic with any questions or concerns prior to follow up appt.     Dispo: Medically stable to discharge home with HH per updated PT/OT recs. Close follow-up with Cardiology and NSGY scheduled.

## 2019-12-07 NOTE — PROGRESS NOTES
[facility]  Neurosurgery  Progress Note    Subjective:     History of Present Illness: 64yom w pmh of HTN, HLD, DM2, thyroid disease, anxiety, and hx of prostate cancer presents after mechanical fall getting out of car and [facility] head [facility] have [facility]. pt transferred to [facility] and repeat CTH showed worsening SDH with icreased mass effect, and decline in mental status in ED.    Post-Op Info:  Procedure(s) (LRB):  Right CRANIOTOMY, FOR SUBDURAL HEMATOMA EVACUATION  (Right)   5 Days Post-Op     Interval History: NAEON. Stepped down from ICU. Vitals and labs stable. Episode of Afib with RVR on 12/4 resolved, EKG yesterday with NSR. HR controlled on current regimen. CPK continues to trend down. Resp Cx from 12/2 growing Serratia, pan-sensitive, likely colonized, no need to tx as pt asymptomatic. Family members at bedside. Neuro exam stable. AAOx4. No further agitation overnight, pt reports he slept well. Postop pain/headaches controlled. Denies weakness, paresthesias, speech difficulty, and gait instability. Tolerating PO diet, SLP upgraded to regular diet. PT/OT recs updated to . Medically stable for discharge home with family today.    Medications:  Continuous Infusions:  Scheduled Meds:   amlodipine-benazepril 5-10 mg  1 capsule Oral Daily    bisacodyl  10 mg Rectal Daily    diltiaZEM  30 mg Oral Q6H    heparin (porcine)  5,000 Units Subcutaneous Q8H    levETIRAcetam  500 mg Oral BID    levothyroxine  100 mcg Oral Before breakfast    metoprolol tartrate  25 mg Oral BID    polyethylene glycol  17 g Oral Daily    thiamine  100 mg Oral Daily     PRN Meds:acetaminophen, acetaminophen, albuterol-ipratropium, clonazePAM, Dextrose 10% Bolus, glucagon (human recombinant), hydrALAZINE, HYDROcodone-acetaminophen, insulin aspart U-100, labetalol, OLANZapine, ondansetron, sodium chloride 0.9%     Review of Systems  Objective:     Weight: 102.1 kg (225 lb)  Body  mass index is 30.52 kg/m².  Vital Signs (Most Recent):  Temp: 98.4 °F (36.9 °C) (12/06/19 1547)  Pulse: 88 (12/06/19 1547)  Resp: 18 (12/06/19 0756)  BP: 120/66 (12/06/19 1547)  SpO2: 97 % (12/06/19 1547) Vital Signs (24h Range):  Temp:  [98 °F (36.7 °C)-99.5 °F (37.5 °C)] 98.4 °F (36.9 °C)  Pulse:  [75-95] 88  Resp:  [18] 18  SpO2:  [93 %-98 %] 97 %  BP: ()/(55-75) 120/66     Date 12/06/19 0700 - 12/07/19 0659   Shift 9541-6726 3061-2687 0022-6651 24 Hour Total   INTAKE   P.O. 710   710   Shift Total(mL/kg) 710(7)   710(7)   OUTPUT   Shift Total(mL/kg)       Weight (kg) 102.1 102.1 102.1 102.1                        Neurosurgery Physical Exam    General: well developed, well nourished, no distress.   Head: normocephalic  Neck: No tracheal deviation. No palpable masses. Full ROM.   Neurologic: Alert and oriented. Thought content appropriate.  GCS: Motor: 6/Verbal: 5/Eyes: 4 GCS Total: 15  Mental Status: Awake, Alert, Oriented x 4 (year: 1920, but then immediately corrects to 2019)  Language: No aphasia  Speech: No dysarthria  Cranial nerves: face symmetric, tongue midline, CN II-XII grossly intact.   Eyes: pupils equal, round, reactive to light with accomodation, EOMI.   Ears: No drainage.   Pulmonary: normal respirations, no signs of respiratory distress  Abdomen: soft, non-distended, not tender to palpation    Sensory: intact to light touch throughout  Motor Strength: Moves all extremities spontaneously with good tone.  Full strength upper and lower extremities. No abnormal movements seen.     DTR's - 2 + and symmetric in UE and LE  Pronator Drift: no drift noted  Finger-to-nose: Intact bilaterally  Clonus: absent  Babinski: absent  Vascular: Pulses 2+ and symmetric radial and dorsalis pedis. No LE edema.   Skin: Skin is warm, dry and intact.  Gait: normal    Cranial Incision: c/d/I with skin edges well approximated with staples. No surrounding erythema or edema. No drainage from incision. No palpable  hematoma or underlying fluid collection.        Significant Labs:  Recent Labs   Lab 12/05/19  0302 12/06/19  0405   * 128*    138   K 4.0 3.9    104   CO2 24 26   BUN 10 12   CREATININE 0.6 0.7   CALCIUM 8.9 9.3   MG 2.0 2.1     Recent Labs   Lab 12/05/19  0302   WBC 8.31   HGB 10.2*   HCT 31.3*        No results for input(s): LABPT, INR, APTT in the last 48 hours.  Microbiology Results (last 7 days)     Procedure Component Value Units Date/Time    Blood culture [[id number]] Collected:  12/02/19 0824    Order Status:  Completed Specimen:  Blood from Peripheral, Hand, Right Updated:  12/06/19 1212     Blood Culture, Routine No Growth to date      No Growth to date      No Growth to date      No Growth to date      No Growth to date    Culture, Respiratory with Gram Stain [[id number]]  (Abnormal)  (Susceptibility) Collected:  12/02/19 0825    Order Status:  Completed Specimen:  Respiratory from Tracheal Aspirate Updated:  12/04/19 1344     Respiratory Culture No S aureus or Pseudomonas isolated.      SERRATIA MARCESCENS  Many       Gram Stain (Respiratory) <10 epithelial cells per low power field.     Gram Stain (Respiratory) No WBC's     Gram Stain (Respiratory) Moderate Gram negative rods        All pertinent labs from the last 24 hours have been reviewed.    Significant Diagnostics:  I have reviewed and interpreted all pertinent imaging results/findings within the past 24 hours.    Assessment/Plan:     * SDH (subdural hematoma)  64yom w pmh of HTN, HLD, DM2, thyroid disease, anxiety, and hx of prostate cancer presents after mechanical fall getting out of car and [facility] head [facility]. pt transferred to [facility] and repeat CTH showed worsening SDH with icreased mass effect, and decline in mental status in ED. Now s/p right craniotomy for SDH evacuation (12/1):    -Neurologically intact on exam  -Stepped down from ICU yesterday, doing well today with  resolution of previous agitation  -Last follow-up CTH on 12/3 post-drain removal satisfactory  -Incision clean dry intact  -Postop Pain: Controlled. Continue Norco 5mg q6h PRN.  -Seizure Prevention: EEG 12/1 negative for seizure activity. Continue Keppra 500mg BID.  -Essential HTN: Continue home metoprolol and amlodipine-benazepril  -Paroxysmal Afib, new-onset: Pt had episode of Afib with RVR on 12/4, replaced with NSR on EKG 12/5. Denies any h/o previous Afib. Started on diltiazem 30mg q6h with rate currently controlled. Continue diltiazem on discharge. Instructed pt and family to f/u with Cardiologist in 1 week for continued evaluation and management, voiced understanding. Referral placed an appt scheduled, although family states they have a Cardiologist they will see, explained they may cancel appt here if that is the case.  -Rhabdomyolysis: CPK continues to trend down, 521 today from 1113 yesterday. Pt asymptomatic, UA negative. Instructed to f/u with PCP/Cardiology after discharge with repeat labs for further monitoring/management, voiced understanding.  -Hypothyroidism: Continue home synthroid  -H/O EtOH Abuse: No signs of DT. Continue daily thiamine supplements  -Overflow Incontinence: Pt voiding spontaneously post-Escudero removal. Restart home bethanechol on discharge.  -Nutrition: Tolerating dysphagia diet, upgraded to regular diet per SLP recs  -DVT prophylaxis: ROB's, SCD's, SQH while inpatient  -Bowel regimen: senna and miralax as needed  -Atelectasis prevention: IS hourly while awake, PT/OT, OOB > 6 hours per day  -Follow up in Neurosurgery clinic in 2 weeks for a wound check  -Follow up with Dr. Toledo in 6 weeks with repeat CTH  -Discharge instructions given verbally to patient/family. All of their questions were answered. They were encouraged to call the clinic with any questions or concerns prior to follow up appt.     Dispo: Medically stable to discharge home with HH per updated PT/OT recs. Close  follow-up with Cardiology and NSGY scheduled.        Tamika Harvey PA-C  Neurosurgery  [facility]

## 2019-12-09 NOTE — PLAN OF CARE
Cm provided list of HH agencies with [employer] with instructions to call SW with choice.

## 2019-12-09 NOTE — PLAN OF CARE
Patient discharged home.  The patient will have HH upon discharge.  Patient spouse advised to call  with HH choice. Family provided transportation home.  Neurosurgery clinic to schedule follow up appointment.    Future Appointments   Date Time Provider Department Center   12/13/2019  9:00 AM Per Lucero MD [facility] CARDIO [facility]   12/19/2019  3:20 PM Mckenna Lai PA-C [facility]   1/23/2020  3:00 PM Niles Toledo DO [facility]        12/09/19 0911   Final Note   Assessment Type Final Discharge Note   Anticipated Discharge Disposition Home-[facility]   Hospital Follow Up  Appt(s) scheduled?   (Neurosurgery clinic to schedule follow up appointment)   Discharge plans and expectations educations in teach back method with documentation complete? Yes

## 2019-12-09 NOTE — PLAN OF CARE
SW following for DC needs. SW in communication with CM.    Patient discharged late Friday.     Patient and wife were provided HH list on Friday and notified to call SW with a HH choice. SW number provided to the patient's wife. SW did not receive a call or message from the family.     SW attempted to call the patient's wife this morning (12/9/2019) to obtain HH choice. No answer.     SW sent referral to [facility] via [facility] so that HH services are not delayed for the patient.      12/09/19 1224   Post-Acute Status   Post-Acute Authorization Home Health/Hospice   Home Health/Hospice Status Referrals Sent     Afsaneh Iverson LCSW [facility]  [id number]

## 2019-12-10 ENCOUNTER — PATIENT OUTREACH (OUTPATIENT)
Dept: ADMINISTRATIVE | Facility: CLINIC | Age: 64
End: 2019-12-10

## 2019-12-10 NOTE — PROGRESS NOTES
Pt states that he has not heard from home health yet, SW note states she attempted to call yesterday, but was unsuccessful. Spoke with both pts wife and daughter and have contact number for daughter to provide to HH. Spoke with Roma at [facility]. States that they have attempted to call with no answer. HH provided with daughters number at this time to contact about pt care.

## 2019-12-12 NOTE — PHYSICIAN QUERY
PT Name: Jermaine Linda  MR #: [id number]    Physician Query Form - Respiratory Condition Clarification      CDS: Sharron Luna RN, CCDS       Contact information: [email or web address]    This form is a permanent document in the medical record.    Query Date: December 12, 2019    By submitting this query, we are merely seeking further clarification of documentation. Please utilize your independent clinical judgment when addressing the question(s) below.    The Medical record contains the following   Indicators   Supporting Clinical Findings Location in Medical Record   X   SOB, HORTON, Wheezing, Productive Cough, Use of Accessory Muscles, etc. Post ictal, pt tachypniec, tachycardic, and hypotensive.      Belly breathing and use of accessory muscles. 12/1-ICU RN Note   X   Acute/Chronic Illness SDH (subdural hematoma) 12/1-NSGY Consult      Radiology Findings     X   Respiratory Distress or Failure Acute respiratory failure with hypoxia and hypercapnia  Off sedation  Plan for extubation this AM  CPT/Duonebs ordered q4h   12/3-NCC PN   X   Hypoxia or Hypercapnia ~1600 called to bedside for seizure. Noted to have L gaze deviation followed by gtc and hypoxia lasting ~1min. Did not return to baseline within 1 hour with labored breathing. In setting of recent surgery, pt was intubated to facilitate imaging (stable). Tx with ativan and increased keppra.    12/1-[facility] Sig Event Note   X   RR         ABGs         O2 sat Vital Signs (24h Range):  Resp:  [17-38] 18  SpO2:  [92 %-100 %] 97 %   12/2-NSGY PN   X      X        X   BiPAP/Intubation The patient was left intubated and transferred to the ICU in stable condition.    Patient was doing well s/p SDH evac but had witnessed GTC sz and got reintubated. Now on EEG and and still intubated.    Intubation  Date/Time: 12/1/2019 4:04 PM  Resp: (!) 38 (12/01/19 1505)  SpO2: 99 % (12/01/19 1505)  Indications: respiratory distress,  airway protection and respiratory failure    12/1-Op Note      12/2-NSGY PN        12/1-NCC Procedure Note        Supplemental O2        Home O2, Oxygen Dependence        Treatment        Other       Respiratory failure can be acute, chronic or both. It is generally further specified as hypoxic, hypercapnic or both. Lastly, it is important to identify an etiology, if known or suspected.   References::  https://www.acphospitalist.org/archives/2013/10/coding.htm http://[employer]/acute-respiratory-failure-know     The clinical guidelines noted below are only system guidelines, and do not replace the providers clinical judgment.  Provider, please specify diagnosis or diagnoses associated with above clinical findings.       PLEASE CLARIFY REASON FOR RE-INTUBATION POST-OP.    [   ] Acute Respiratory Failure with Hypoxia - ABG pO2 < 60 mmHg or O2 sat of 88% on RA and respiratory symptoms documented   [   ] Acute Respiratory Failure with Hypoxia and Hypercapnia - Hypoxia: ABG pO2 < 60 mmHg or O2 sat of 88% on RA and Hypercapnia: pCO2 > 50 mmHg with pH < 7.35 and respiratory symptoms documented   [   ] Other Acute Respiratory Failure     [   ] No Respiratory Failure, Maintained on Vent for Routine Care or Airway Protection -  purposely intubated for airway protection (e.g.: angioedema, stroke, trauma); without meeting the criteria for respiratory failure.    [  x ] Other Respiratory Diagnosis (please specify): ___was having a seizure______________________________   [   ] Clinically Undetermined       Please document in your progress notes daily for the duration of treatment until resolved and include in your discharge summary.

## 2019-12-13 NOTE — PLAN OF CARE
12/13/2019  [facility] has been attempting to admit the patient to home health services since Monday 12/9/2019.    SW received message from Moira at [facility]. Moira reported that the patient has not yet been admitted for home health services and is still refusing to pay the $15 co-pay. Moira stated that they have continued to call the patient and family with no answer and no return calls.     Afsaneh Iverson, LCSW [facility]  [id number]

## 2019-12-16 PROCEDURE — G0180 PR HOME HEALTH MD CERTIFICATION: ICD-10-PCS | Mod: ,,, | Performed by: NEUROLOGICAL SURGERY

## 2019-12-16 PROCEDURE — G0180 MD CERTIFICATION HHA PATIENT: HCPCS | Mod: ,,, | Performed by: NEUROLOGICAL SURGERY

## 2019-12-19 ENCOUNTER — OFFICE VISIT (OUTPATIENT)
Dept: NEUROSURGERY | Facility: CLINIC | Age: 64
End: 2019-12-19
Payer: COMMERCIAL

## 2019-12-19 VITALS
DIASTOLIC BLOOD PRESSURE: 64 MMHG | HEIGHT: 72 IN | WEIGHT: 225.06 LBS | SYSTOLIC BLOOD PRESSURE: 116 MMHG | HEART RATE: 79 BPM | BODY MASS INDEX: 30.48 KG/M2

## 2019-12-19 DIAGNOSIS — S06.5XAA SDH (SUBDURAL HEMATOMA): Primary | ICD-10-CM

## 2019-12-19 DIAGNOSIS — Z98.890 S/P CRANIOTOMY: ICD-10-CM

## 2019-12-19 PROCEDURE — 99999 PR PBB SHADOW E&M-EST. PATIENT-LVL III: ICD-10-PCS | Mod: PBBFAC,,, | Performed by: PHYSICIAN ASSISTANT

## 2019-12-19 PROCEDURE — 99999 PR PBB SHADOW E&M-EST. PATIENT-LVL III: CPT | Mod: PBBFAC,,, | Performed by: PHYSICIAN ASSISTANT

## 2019-12-19 PROCEDURE — 99024 POSTOP FOLLOW-UP VISIT: CPT | Mod: S$GLB,,, | Performed by: PHYSICIAN ASSISTANT

## 2019-12-19 PROCEDURE — 99024 PR POST-OP FOLLOW-UP VISIT: ICD-10-PCS | Mod: S$GLB,,, | Performed by: PHYSICIAN ASSISTANT

## 2019-12-19 RX ORDER — HYDROCODONE BITARTRATE AND ACETAMINOPHEN 5; 325 MG/1; MG/1
2 TABLET ORAL EVERY 6 HOURS PRN
Qty: 56 TABLET | Refills: 0 | Status: SHIPPED | OUTPATIENT
Start: 2019-12-19 | End: 2022-09-26 | Stop reason: DRUGHIGH

## 2019-12-19 NOTE — PROGRESS NOTES
"Wound Check   Neurosurgery     Jermaine Linda is a 64 y.o. male who presents to clinic today for 2 week wound check, s/p craniotomy for traumatic SDH evacuation.  Denies fevers, chills, night sweats or N/V. Further denies wound drainage or swelling. Pt has been taking Norco at night with good relief. Denies new numbness since discharge. Some residual numbness in his left arm and hand. No seizure activity. Patient presents with his brother who assists in the history. He reports that when the patient overexerts himself around the house, the fatigue causes some mental slowing and increased forgetfulness. He has returned to his post-op baseline.       Physical Exam:   General: well developed, well nourished, no distress  Neurologic: Alert and oriented. Thought content appropriate.   GCS: Motor: 6/Verbal: 5/Eyes: 4 GCS Total: 15   Mental Status: Awake, Alert, Oriented x3* still having difficulty with the year, similar to hospital evaluation. He is able to state "19" but then gets confused.   Cranial nerves: face symmetric, tongue midline, pupils equal, round, reactive to light with accomodation, EOMI.   Motor Strength: moves all extremities with good strength and tone   Sensation: response to light touch throughout  Ambulating in wheel chair  No drift noted   Finger to nose intact bilaterally    Incision is clean, dry and intact with no signs of erythema, swelling or purulent drainage. Staples are intact. All skin edges are completely approximated. --Staples removed in clinic       Vitals:    12/19/19 1447   BP: 116/64   Pulse: 79             Assessment/Plan:   Jermaine Linda is a 64 y.o. male who presents for 2 week wound check, s/p craniotomy for traumatic SDH evacuation with Dr. Toledo on 12/1/19. Recovering as expected from surgery. He has home health PT/OT/speech therapy coming to his home. Family assists in his care as well. He does spend some time home alone. Discussed with patient and brother that he should " avoid overexertion during the healing phase as well as walking without his walker. Patient requested permission to go on a road trip to [place] for business. Advised against that at this time. Will reassess at his next post-op.     -Keep incision open to air   -Refill for Norco  printed   -Can shower and get incision wet, just pat dry and no vigorous scrubbing. Do not submerge incision for another 4 weeks.   -No lifting more than 10 lbs or excessive bending/twisting.   -Can drive after 4 weeks when no longer taking narcotics   -Follow up with Dr. Toledo in 4 weeks with head CT   -Please call with any questions or concerns prior to your next appointment.     Report to the emergency room for any seizure activity, new numbness/weakness, facial droop, speech or vision changes, or confusion.     **[phone] is the number for [facility]. Ask for neurosurgery on call if there is an emergency.   **Less urgent issues can be directed to our clinic via [facility] or by calling [phone]        Mckenna Lai PA-C  [facility]  Department of Neurosurgery  [phone]

## 2019-12-19 NOTE — PATIENT INSTRUCTIONS
-Keep incision open to air   -Refill for Norco  printed   -Can shower and get incision wet, just pat dry and no vigorous scrubbing. Do not submerge incision for another 4 weeks.   -No lifting more than 10 lbs or excessive bending/twisting.   -Can drive after 4 weeks when no longer taking narcotics   -Follow up with Dr. Toledo in 4 weeks with head CT   -Please call with any questions or concerns prior to your next appointment.     Report to the emergency room for any seizure activity, new numbness/weakness, facial droop, speech or vision changes, or confusion.     **[phone] is the number for [facility]. Ask for neurosurgery on call if there is an emergency.   **Less urgent issues can be directed to our clinic via [facility] or by calling [phone]

## 2019-12-30 NOTE — ADDENDUM NOTE
Addendum  created 12/30/19 1155 by Abundio Peters MD    Intraprocedure Blocks edited, Sign clinical note

## 2019-12-31 ENCOUNTER — TELEPHONE (OUTPATIENT)
Dept: HOME HEALTH SERVICES | Facility: HOSPITAL | Age: 64
End: 2019-12-31

## 2019-12-31 NOTE — TELEPHONE ENCOUNTER
----- Message from Buffy Mera sent at 12/31/2019  1:53 PM CST -----  Type: Patient Call Back    Who called: Edna with [facility]    What is the request in detail: Rep states pt is scheduled to have MRI there and would like to know what type of surgery pt had to make sure it's MRI safe    Can the clinic reply by [facility]?     Would the patient rather a call back or a response via [facility]? Call back     Best call back number: [phone]    Additional Information:    Spoke to Christine, gave surgery information per patients chart

## 2019-12-31 NOTE — PROCEDURES
Date of service  12/1/2019-12/3/2019    Introduction  Electroencephalographic (EEG) is recorded with electrodes placed according to the International 10-20 placement system.  Thirty two (32) channels  of digital signal (sampling rate of 512/sec), including T1 and T2, were simultaneously recorded from the scalp and may include EKG, EMG, and/or eye monitors.  Recording band pass was 0.1 to 512 Hz.  Digital video recording of the patient is simultaneously recorded with the EEG.  The patient is instructed to report clinical symptoms which may occur during the recording session.  EEG and video recording are stored and archived in digital format.  Activation procedures, which include photic stimulation, hyperventilation and instructing patients to perform simple tasks, are done in selected patients.    The EEG is displayed on a monitor screen and can be reviewed using different montages.  Computer-assisted analysis is employed to detect spike and electrographic seizure activity.   The entire record is submitted for computer analysis.  The entire recording is visually reviewed, and the times identified by computer analysis as being spikes or seizures are reviewed again.      Compressed spectral analysis (CSA) is also performed on the activity recorded from each individual channel.  This is displayed as a power display of frequencies from 0 to 30 Hz over time.   The CSA is reviewed looking for asymmetries in power between homologous areas of the scalp, then compared with the original EEG recording.      [employer] software was also utilized in the review of this study. This software suite analyzes the EEG recording in multiple domains. Coherence and rhythmicity are computed to identify EEG sections which may contain organized seizures. Each channel undergoes analysis to detect the presence of spike and sharp waves which have special and morphological characteristics of epileptic activity. The routine EEG recording is converted  from special into frequency domain. This is then displayed comparing homologous areas to identify areas of significant asymmetry. Algorithm to identify non-cortically generated artifact is used to separate artifact from the EEG.    Recording Times  Start on 12/1/19 at 19:23:08  Stop on 12/2/19 at 07:00:08  Start on 12/2/19 at 07:01:23  Stop on 12/3/19 at 07:00:10  Start on 12/3/19 at 07:02:25  Stop on 12/3/19 at 14:27:46  A total of 42 hours, 47 minutes, and 11 seconds of EEG/video telemetry was recorded.    Findings  The patient's background consists of diffuse slowing, with predominantly theta range frequencies appreciated.  There are superimposed faster frequencies in the alpha to beta range noted.  The slowing is more pronounced over the right hemisphere.  There is also right sided breech rhythm observed, which does result in the amplitude of the activity seen on the right being higher and in more superimposed faster frequencies being noted in this area.  There are no focal, lateralized, or epileptiform transients.  No electrographic seizures are seen.    Hyperventilation and photic stimulation were not performed.     EKG demonstrates sinus rhythm.    Interpretation  This is an abnormal LTM-EEG due to the presence of slowing as described above.  This finding is indicative of a mild to moderate encephalopathy, though it is not specific for a particular underlying etiology.  Additionally, a breech rhythm is seen on the right.  No seizures occur during this study.

## 2020-01-03 ENCOUNTER — EXTERNAL HOME HEALTH (OUTPATIENT)
Dept: HOME HEALTH SERVICES | Facility: HOSPITAL | Age: 65
End: 2020-01-03
Payer: COMMERCIAL

## 2020-01-14 ENCOUNTER — TELEPHONE (OUTPATIENT)
Dept: NEUROLOGY | Facility: CLINIC | Age: 65
End: 2020-01-14

## 2020-01-14 NOTE — TELEPHONE ENCOUNTER
PT stated that he was washing his hair and some staples were removed during the process. Per JK PT was asked to rtc to have remaining staples removed. Pt stated that he would like to be seen on Jan 23. I offered the PT to come in at his convenience and he decided on Jan 23.

## 2020-01-23 ENCOUNTER — OFFICE VISIT (OUTPATIENT)
Dept: NEUROSURGERY | Facility: CLINIC | Age: 65
End: 2020-01-23
Payer: COMMERCIAL

## 2020-01-23 ENCOUNTER — HOSPITAL ENCOUNTER (OUTPATIENT)
Dept: RADIOLOGY | Facility: HOSPITAL | Age: 65
Discharge: HOME | End: 2020-01-23
Attending: PHYSICIAN ASSISTANT
Payer: COMMERCIAL

## 2020-01-23 VITALS
DIASTOLIC BLOOD PRESSURE: 66 MMHG | RESPIRATION RATE: 16 BRPM | WEIGHT: 235 LBS | BODY MASS INDEX: 31.14 KG/M2 | HEIGHT: 73 IN | SYSTOLIC BLOOD PRESSURE: 122 MMHG | HEART RATE: 73 BPM | TEMPERATURE: 98 F

## 2020-01-23 DIAGNOSIS — Z98.890 S/P CRANIOTOMY: ICD-10-CM

## 2020-01-23 DIAGNOSIS — S06.5XAA SDH (SUBDURAL HEMATOMA): Primary | ICD-10-CM

## 2020-01-23 DIAGNOSIS — S06.5XAA SDH (SUBDURAL HEMATOMA): ICD-10-CM

## 2020-01-23 PROCEDURE — 70450 CT HEAD WITHOUT CONTRAST: ICD-10-PCS | Mod: 26,,, | Performed by: RADIOLOGY

## 2020-01-23 PROCEDURE — 70450 CT HEAD/BRAIN W/O DYE: CPT | Mod: TC

## 2020-01-23 PROCEDURE — 99024 POSTOP FOLLOW-UP VISIT: CPT | Mod: S$GLB,,, | Performed by: PHYSICIAN ASSISTANT

## 2020-01-23 PROCEDURE — 99999 PR PBB SHADOW E&M-EST. PATIENT-LVL IV: CPT | Mod: PBBFAC,,, | Performed by: PHYSICIAN ASSISTANT

## 2020-01-23 PROCEDURE — 99999 PR PBB SHADOW E&M-EST. PATIENT-LVL IV: ICD-10-PCS | Mod: PBBFAC,,, | Performed by: PHYSICIAN ASSISTANT

## 2020-01-23 PROCEDURE — 70450 CT HEAD/BRAIN W/O DYE: CPT | Mod: 26,,, | Performed by: RADIOLOGY

## 2020-01-23 PROCEDURE — 99024 PR POST-OP FOLLOW-UP VISIT: ICD-10-PCS | Mod: S$GLB,,, | Performed by: PHYSICIAN ASSISTANT

## 2020-01-23 RX ORDER — SULFAMETHOXAZOLE AND TRIMETHOPRIM 800; 160 MG/1; MG/1
1 TABLET ORAL 2 TIMES DAILY
Qty: 14 TABLET | Refills: 0 | Status: SHIPPED | OUTPATIENT
Start: 2020-01-23 | End: 2020-01-30

## 2020-01-23 NOTE — PROGRESS NOTES
[facility]     Interval history 1/23/2020:  Patient returns to clinic today with his wife for 6 week post-op evaluation, s/p craniotomy for traumatic SDH evacuation. Speech has improved, still has short term memory loss. He continues to ambulate with a rolling walker at home and often feels dizzy. He also feels like his left arm is weak and causing him to drop items frequently. He has been participating in  speech therapy which recently ended.     His wife reports she noticed some redness and questionable drainage over his incision just above his right ear. She noticed this approximately 2 weeks ago and began putting neosporin on it daily. She believes the redness has improved.     HPI 12/19/19:  Jermaine Linda is a 64 y.o. male who presents to clinic today for 2 week wound check, s/p craniotomy for traumatic SDH evacuation.  Denies fevers, chills, night sweats or N/V. Further denies wound drainage or swelling. Pt has been taking Norco at night with good relief. Denies new numbness since discharge. Some residual numbness in his left arm and hand. No seizure activity. Patient presents with his brother who assists in the history. He reports that when the patient overexerts himself around the house, the fatigue causes some mental slowing and increased forgetfulness. He has returned to his post-op baseline.       Physical Exam:   General: well developed, well nourished, no distress  Neurologic: Alert and oriented. Thought content appropriate.   GCS: Motor: 6/Verbal: 5/Eyes: 4 GCS Total: 15   Mental Status: Awake, Alert, Oriented x4  Cranial nerves: face symmetric, tongue midline, pupils equal, round, reactive to light with accomodation, EOMI.   Motor Strength: moves all extremities with good strength and tone   Sensation: response to light touch throughout  Ambulating in wheel chair  No drift noted   Finger to nose intact bilaterally    Incision is well healed in most areas. A few small suture tails are  visible superficially with no associated redness. The incision is red and raised just above the right ear. No drainage.       Vitals:    01/23/20 1437   BP: 122/66   Pulse: 73   Resp: 16   Temp: 98.4 °F (36.9 °C)       Imaging:  I have personally reviewed imaging and agree with the findings.     Head CT 1/23/2020  Patient status post right subdural evacuation with only very minimal residual subdural collection and no significant mass effect.  No new hemorrhage or other detrimental findings.      Assessment/Plan:   Jermaine Linda is a 64 y.o. male who presents for 6 week post-op, s/p craniotomy for traumatic SDH evacuation with Dr. Toledo on 12/1/19. Recovering as expected from surgery. He has completed  speech therapy and is ready to transition to outpatient PT.     -Referral placed to PT at [facility] to include the neuro program  -Bactrim DS bid x1 week for raised, red area of incision. Wife asked to monitor the wound daily and contact neurosurgery for any changes  -No further refills for Norco. Patient informed neurosurgery that he will continue taking it as prescribed by his pain mgmt provider for chronic back and knee issues  -Can shower and get incision wet, just pat dry and no vigorous scrubbing. Do not submerge incision until area in question is fully resolved    -Head CT showed near resolution of prior SDH. No further imaging required.   -Continue Keppra until I can review with Dr. Toledo. He may recommend neurology referral for further management.     Please call with any questions or concerns prior to your next appointment.           Mckenna Lai PA-C  [facility]  Department of Neurosurgery  [phone]

## 2020-02-11 ENCOUNTER — TELEPHONE (OUTPATIENT)
Dept: NEUROSURGERY | Facility: CLINIC | Age: 65
End: 2020-02-11

## 2020-02-11 NOTE — TELEPHONE ENCOUNTER
----- Message from Nancy Esparza MA sent at 2/11/2020  3:55 PM CST -----  Contact: [phone]  Patient has a piece of thread still handing out of head. Patient would like to know should his nurse cut, pull it, or leave it alone. Please call to advise.

## 2020-02-11 NOTE — TELEPHONE ENCOUNTER
Spoke with patient. Thread is the same thread noted on last exam. He has a small (approximately 1mm) piece of dissolvable suture that has penetrated through the incision. This should dissolve and fall off over time. No need to cut or pull the thread. Denies fever, redness, swelling, drainage, and opening of the incision.     Advised to call clinic with any wound changes. Otherwise, will plan to see him at routine 3 month post-op evaluation with Dr. Toledo.       Mckenna Lai PA-C  [facility]  Department of Neurosurgery  [phone]

## 2020-02-14 ENCOUNTER — TELEPHONE (OUTPATIENT)
Dept: HOME HEALTH SERVICES | Facility: HOSPITAL | Age: 65
End: 2020-02-14

## 2020-02-27 PROBLEM — R27.9 LACK OF COORDINATION: Status: ACTIVE | Noted: 2020-02-27

## 2020-02-27 PROBLEM — Z91.81 AT RISK FOR FALLS: Status: ACTIVE | Noted: 2020-02-27

## 2020-02-27 PROBLEM — Z74.09 IMPAIRED FUNCTIONAL MOBILITY, BALANCE, AND ENDURANCE: Status: ACTIVE | Noted: 2020-02-27

## 2020-02-27 PROBLEM — Z74.09 DECREASED INDEPENDENCE WITH TRANSFERS: Status: ACTIVE | Noted: 2020-02-27

## 2020-02-27 PROBLEM — M62.81 MUSCLE WEAKNESS OF LOWER EXTREMITY: Status: ACTIVE | Noted: 2020-02-27

## 2020-02-28 PROBLEM — R41.3 POOR SHORT TERM MEMORY: Status: ACTIVE | Noted: 2020-02-28

## 2020-02-28 PROBLEM — R47.89 WORD FINDING DIFFICULTY: Status: ACTIVE | Noted: 2020-02-28

## 2020-02-28 PROBLEM — R47.1 DYSARTHRIA: Status: ACTIVE | Noted: 2020-02-28

## 2020-02-28 PROBLEM — R41.841 COGNITIVE COMMUNICATION DEFICIT: Status: ACTIVE | Noted: 2020-02-28

## 2020-03-05 ENCOUNTER — OFFICE VISIT (OUTPATIENT)
Dept: NEUROSURGERY | Facility: CLINIC | Age: 65
End: 2020-03-05
Payer: COMMERCIAL

## 2020-03-05 VITALS
WEIGHT: 234.81 LBS | HEIGHT: 73 IN | HEART RATE: 70 BPM | TEMPERATURE: 98 F | DIASTOLIC BLOOD PRESSURE: 75 MMHG | BODY MASS INDEX: 31.12 KG/M2 | SYSTOLIC BLOOD PRESSURE: 121 MMHG

## 2020-03-05 DIAGNOSIS — Z74.09 IMPAIRED FUNCTIONAL MOBILITY, BALANCE, AND ENDURANCE: ICD-10-CM

## 2020-03-05 DIAGNOSIS — Z86.79 S/P SUBDURAL HEMATOMA EVACUATION: Primary | ICD-10-CM

## 2020-03-05 DIAGNOSIS — R41.841 COGNITIVE COMMUNICATION DEFICIT: ICD-10-CM

## 2020-03-05 DIAGNOSIS — Z98.890 S/P SUBDURAL HEMATOMA EVACUATION: Primary | ICD-10-CM

## 2020-03-05 PROCEDURE — 99024 PR POST-OP FOLLOW-UP VISIT: ICD-10-PCS | Mod: S$GLB,,, | Performed by: NEUROLOGICAL SURGERY

## 2020-03-05 PROCEDURE — 99024 POSTOP FOLLOW-UP VISIT: CPT | Mod: S$GLB,,, | Performed by: NEUROLOGICAL SURGERY

## 2020-03-05 PROCEDURE — 99999 PR PBB SHADOW E&M-EST. PATIENT-LVL III: CPT | Mod: PBBFAC,,, | Performed by: NEUROLOGICAL SURGERY

## 2020-03-05 PROCEDURE — 99999 PR PBB SHADOW E&M-EST. PATIENT-LVL III: ICD-10-PCS | Mod: PBBFAC,,, | Performed by: NEUROLOGICAL SURGERY

## 2020-03-05 NOTE — PATIENT INSTRUCTIONS
1. Wean off keppra - take 1 tablet once per day for 7 days then stop  2. Continue PT and speech therapy  3. F/u with PCP and cardiologist as scheduled

## 2020-03-05 NOTE — PROGRESS NOTES
CHIEF COMPLAINT:  3 month f/u    HPI:    Jermaine Linda is a 65 y.o.-year-old male who presents today for post-operative follow-up s/p R crani for evac of acute SDH on 12/1/19.  He has made a good recovery but continues to have residual mild left sided weakness, gait disturbance requiring FWW, dizziness, and low back/tailbone pain.  His wound has healed well but reports sensation that he is wearing a hat.  Also reports short term memory difficulties but is actively engage in PT and ST.  He is eager to advance his activities and resume driving.    He has not had any seizures before or after surgery.    ROS:  As stated in the above HPI    PE:  Vitals:    03/05/20 1348   BP: 121/75   Pulse: 70   Temp: 97.9 °F (36.6 °C)       AAOX3  NAD  CN 2-12 intact     Strength:      Deltoids Biceps Triceps Wrist Ext. Wrist Flex. Hand    RUE 5 5 5 5 5 5   LUE 5 5 5 5 5 5    Hip Flex. Knee Flex. Knee Ext. Dorsi Flex Plantar Flex EHL   RLE 5 5 5 5 5 5   LLE 5 5 5 5 5 5     Sensation:  Intact to light touch (All 4 extremities)    Gait:  Uses FWW for stability    Cranial incision:  Well healed    IMAGING:  All imaging reviewed by me.    HCT, 1/23/20: Near complete resolution of R SDH    ASSESSMENT:   S/p R crani for evac of acute SDH.  Has recovered well with some mild residual left sided clumsiness, gait disturbance, cognitive and short term memory issues.      PLAN:   - Cont PT and ST   - Wean off Keppra (take 500mg daily for 7 days then stop) - no szs pre or postop  - F/u with PCP and cards as scheduled  - F/u as needed

## 2020-04-28 ENCOUNTER — TELEPHONE (OUTPATIENT)
Dept: NEUROSURGERY | Facility: HOSPITAL | Age: 65
End: 2020-04-28

## 2020-04-28 NOTE — TELEPHONE ENCOUNTER
Spoke with patient's brother via telephone regarding questions about Keppra. He states the patient has had some confusion about his medications due to continued short term memory loss and is still taking Keppra currently. He denies any known seizures. I explained to him that per Dr. Toledo's last clinic visit note on 3/5, patient no longer needs the Keppra. I instructed him to have the patient wean off Keppra to 500mg once daily for 7 days, then discontinue. Advised him to discuss questions about other medications with patient's PCP/Cardiologist. All questions answered, encouraged him to call us should he have any other concerns.    Tamika Harvey PA-C  Neurosurgery  [facility]

## 2020-05-07 PROBLEM — Z74.09 DECREASED INDEPENDENCE WITH TRANSFERS: Status: RESOLVED | Noted: 2020-02-27 | Resolved: 2020-05-07

## 2020-05-07 PROBLEM — R27.9 LACK OF COORDINATION: Status: RESOLVED | Noted: 2020-02-27 | Resolved: 2020-05-07

## 2020-05-07 PROBLEM — Z91.81 AT RISK FOR FALLS: Status: RESOLVED | Noted: 2020-02-27 | Resolved: 2020-05-07

## 2020-05-07 PROBLEM — M62.81 MUSCLE WEAKNESS OF LOWER EXTREMITY: Status: RESOLVED | Noted: 2020-02-27 | Resolved: 2020-05-07

## 2020-05-07 PROBLEM — Z74.09 IMPAIRED FUNCTIONAL MOBILITY, BALANCE, AND ENDURANCE: Status: RESOLVED | Noted: 2020-02-27 | Resolved: 2020-05-07

## 2020-05-27 PROBLEM — R47.1 DYSARTHRIA: Status: RESOLVED | Noted: 2020-02-28 | Resolved: 2020-05-27

## 2020-06-18 PROBLEM — R47.89 WORD FINDING DIFFICULTY: Status: RESOLVED | Noted: 2020-02-28 | Resolved: 2020-06-18

## 2020-06-18 PROBLEM — R41.841 COGNITIVE COMMUNICATION DEFICIT: Status: RESOLVED | Noted: 2020-02-28 | Resolved: 2020-06-18

## 2020-06-23 RX ORDER — LEVETIRACETAM 500 MG/1
500 TABLET ORAL 2 TIMES DAILY
Qty: 60 TABLET | Refills: 6 | OUTPATIENT
Start: 2020-06-23

## 2020-06-23 NOTE — TELEPHONE ENCOUNTER
Patient was instructed to wean off Keppra at his last visit. Was also again instructed by neurosurgery last month to wean off Keppra. Instructions were given to his brother in May.     He states today that he has continued the medication. I reviewed the weaning instructions with him, but will also call tomorrow to confirm with family members given his memory difficulties.         Mckenna Lai PA-C  [facility]  Department of Neurosurgery  [phone]

## 2020-10-08 ENCOUNTER — TELEPHONE (OUTPATIENT)
Dept: NEUROSURGERY | Facility: CLINIC | Age: 65
End: 2020-10-08

## 2020-10-08 NOTE — PROGRESS NOTES
CHIEF COMPLAINT:  Driving clearance    Interval history 10/09/2020:  Patient returns to clinic today, status post right craniotomy for evacuation of acute subdural hematoma on 12/01/2019.  He has been driving short distances to [place] and back, roughly 3 miles.  He questions whether he can be fully cleared to drive.  He reports recent onset of dizzy spells and losing his balance.  He reports multiple falls, but he denies any head or neck trauma.    He has been having trouble putting socks and shoes on and trouble putting on underwear, both due to balance. He reports continued memory issues from prior SDH.  Denies seizures    He wonders today if he is officially cleared to resume driving.     HPI 3/2020:  Jermaine Linda is a 65 y.o.-year-old male who presents today for post-operative follow-up s/p R crani for evac of acute SDH on 12/1/19.  He has made a good recovery but continues to have residual mild left sided weakness, gait disturbance requiring FWW, dizziness, and low back/tailbone pain.  His wound has healed well but reports sensation that he is wearing a hat.  Also reports short term memory difficulties but is actively engage in PT and ST.  He is eager to advance his activities and resume driving.    He has not had any seizures before or after surgery.    ROS:  As stated in the above HPI    PE:  Vitals:    10/09/20 1047   BP: 132/69   Pulse: 77   Temp: 98 °F (36.7 °C)       AAOX3  NAD  CN 2-12 intact     Strength:      Deltoids Biceps Triceps Wrist Ext. Wrist Flex. Hand    RUE 5 5 5 5 5 5   LUE 5 5 5 5 5 5    Hip Flex. Knee Flex. Knee Ext. Dorsi Flex Plantar Flex EHL   RLE 5 5 5 5 5 5   LLE 5 5 5 5 5 5     Sensation:  Intact to light touch (All 4 extremities)  Tingling to fingertips    Finger-to-nose:  Intact bilaterally  Pronator drift:  No drift noted    Gait:  Stable, uses cane for stability    Cranial incision:  Well healed    IMAGING:  All imaging reviewed by me.    HCT, 1/23/20: Near  complete resolution of R SDH    ASSESSMENT:   Jermaine Linda is a 65 y.o. male who is S/p R crani for evac of acute SDH on 12/01/2019.  The at his last visit with Dr. Toledo, he was cleared to stop Keppra and follow up as needed.  He has recovered well with some continued balance issues, memory issues, and falls.  More recently, he has been experiencing some dizzy spells.  Denies any head or neck trauma from his falls.  He has been driving short distances only, but is wondering if he can be officially cleared for full driving.  Has recovered well with some mild residual left sided clumsiness, gait disturbance, cognitive and short term memory issues.      PLAN:   -follow-up with primary care regarding recent onset of dizziness  -referral to physical therapy for driving evaluation ([facility])  -no driving at this time      Mckenna Lai PA-C  [facility]  Department of Neurosurgery  [phone]

## 2020-10-08 NOTE — TELEPHONE ENCOUNTER
----- Message from Celia Collins sent at 10/8/2020  1:19 PM CDT -----  Contact: Patient [phone]  Type:  Patient Returning Call    Who Called: Patient    Who Left Message for Patient: Renee    Does the patient know what this is regarding?: Told to return call    Would the patient rather a call back or a response via [facility]? Call back    Best Call Back Number: [phone]

## 2020-10-08 NOTE — TELEPHONE ENCOUNTER
----- Message from Celia Cage sent at 10/8/2020  1:39 PM CDT -----  Contact: Self [phone]  Type:  Patient Returning Call    Who Called: Self    Who Left Message for Patient: Renee     Does the patient know what this is regarding?:No    Would the patient rather a call back or a response via [facility]? Call Back    Best Call Back Number:[phone]

## 2020-10-08 NOTE — TELEPHONE ENCOUNTER
----- Message from Thais Peck sent at 10/7/2020  3:13 PM CDT -----  Contact: self  Pt is asking for a call back in regards to restrictions     Contact info- [phone]

## 2020-10-08 NOTE — TELEPHONE ENCOUNTER
Per dr. Tre wilder to schedule with farnaz. Pt scheduled for tomorrow morning for follow up and to be cleared to drive.

## 2020-10-09 ENCOUNTER — OFFICE VISIT (OUTPATIENT)
Dept: NEUROSURGERY | Facility: CLINIC | Age: 65
End: 2020-10-09
Payer: COMMERCIAL

## 2020-10-09 VITALS
TEMPERATURE: 98 F | HEIGHT: 68 IN | WEIGHT: 239 LBS | BODY MASS INDEX: 36.22 KG/M2 | OXYGEN SATURATION: 98 % | SYSTOLIC BLOOD PRESSURE: 132 MMHG | DIASTOLIC BLOOD PRESSURE: 69 MMHG | HEART RATE: 77 BPM

## 2020-10-09 DIAGNOSIS — Z74.09 IMPAIRED FUNCTIONAL MOBILITY, BALANCE, AND ENDURANCE: ICD-10-CM

## 2020-10-09 DIAGNOSIS — Z98.890 S/P SUBDURAL HEMATOMA EVACUATION: Primary | ICD-10-CM

## 2020-10-09 DIAGNOSIS — Z86.79 S/P SUBDURAL HEMATOMA EVACUATION: Primary | ICD-10-CM

## 2020-10-09 PROCEDURE — 99213 OFFICE O/P EST LOW 20 MIN: CPT | Mod: S$GLB,,, | Performed by: PHYSICIAN ASSISTANT

## 2020-10-09 PROCEDURE — 99999 PR PBB SHADOW E&M-EST. PATIENT-LVL IV: ICD-10-PCS | Mod: PBBFAC,,, | Performed by: PHYSICIAN ASSISTANT

## 2020-10-09 PROCEDURE — 99999 PR PBB SHADOW E&M-EST. PATIENT-LVL IV: CPT | Mod: PBBFAC,,, | Performed by: PHYSICIAN ASSISTANT

## 2020-10-09 PROCEDURE — 99213 PR OFFICE/OUTPT VISIT, EST, LEVL III, 20-29 MIN: ICD-10-PCS | Mod: S$GLB,,, | Performed by: PHYSICIAN ASSISTANT

## 2020-10-14 ENCOUNTER — TELEPHONE (OUTPATIENT)
Dept: NEUROSURGERY | Facility: CLINIC | Age: 65
End: 2020-10-14

## 2020-10-14 NOTE — TELEPHONE ENCOUNTER
----- Message from Nareshdami RyderCristofer sent at 10/14/2020 11:28 AM CDT -----  Regarding: self  Type: Patient Call Back    Who called: Self    What is the request in detail: pt would like physical therapy scheduled in [place]. [facility]    Can the clinic reply by [facility]? no    Would the patient rather a call back or a response via [facility]? Call     Best call back number: [phone]

## 2020-10-14 NOTE — TELEPHONE ENCOUNTER
Spoke with outpatient therapy and informed of referral they will contact pt regarding sceduling. Pt informed. anival

## 2022-01-03 NOTE — ASSESSMENT & PLAN NOTE
-off cEEG  -keppra 1gm BID, wean to 500 BID today   Patient/Caregiver provided printed discharge information.

## 2022-12-02 ENCOUNTER — PATIENT MESSAGE (OUTPATIENT)
Dept: RESEARCH | Facility: HOSPITAL | Age: 67
End: 2022-12-02

## 2025-04-29 NOTE — PLAN OF CARE
POC reviewed with pt and pt's son 0500. Pt intubated and unable to verbalize understanding. Pt's son verbalized understanding of care. Questions and concerns addressed. No acute events overnight. Propofol gtt and NS gtt continued. CEEG remains in place. PRN fentanyl given throughout shift. TF's at goal and pt tolerating well. Pt progressing toward goals. Will continue to monitor. See flowsheets for full assessment and VS info        Problem: Adult Inpatient Plan of Care  Goal: Plan of Care Review  Outcome: Ongoing, Progressing  Flowsheets (Taken 12/3/2019 0514)  Plan of Care Reviewed With: patient;family     Problem: Infection  Goal: Infection Symptom Resolution  Outcome: Ongoing, Progressing  Intervention: Prevent or Manage Infection  Flowsheets (Taken 12/3/2019 0514)  Fever Reduction/Comfort Measures: lightweight bedding;lightweight clothing  Infection Management: aseptic technique maintained  Isolation Precautions: protective environment maintained     Problem: Seizure, Active Management  Goal: Absence of Seizure/Seizure-Related Injury  Outcome: Ongoing, Progressing  Intervention: Prevent Seizure-Related Injury  Flowsheets (Taken 12/3/2019 0514)  Seizure Precautions: clutter-free environment maintained     Problem: Restraint, Nonbehavioral (Nonviolent)  Goal: Discontinuation Criteria Achieved  Outcome: Ongoing, Progressing  Intervention: Implement Least-restrictive Safety Strategies  Flowsheets (Taken 12/3/2019 0514)  Medical Device Protection: IV pole/bag removed from visual field;tubing secured  Less Restrictive Alternatives: 1:1 observation maintained;bed alarm in use;calming techniques promoted;positive reinforcement provided;safety enhancements provided  Goal: Personal Dignity and Safety Maintained  Outcome: Ongoing, Progressing  Intervention: Protect Dignity, Rights, and Personal Wellbeing  Flowsheets (Taken 12/3/2019 0514)  Trust Relationship/Rapport: care explained;emotional support provided;reassurance  provided  Intervention: Protect Skin and Joint Integrity  Flowsheets (Taken 12/3/2019 0514)  Body Position: weight shift assistance provided  Intervention: Education  Flowsheets (Taken 12/3/2019 0514)  Discontinuation Criteria: Absence of behavior that required restraint  Criteria Explained: Yes  Patient's Response: NL  Patient / Family Notification: Patient  Patient / Family Teaching: Need for restraint;Restraint monitoring;Reevaluation time frames;Attempted alternatives;DC criteria  Intervention: Restraint Monitoring Q2 hours w/Assessment for Injury  Flowsheets (Taken 12/3/2019 0514)  Observed Emotional State: calm  Visual Check: SD  Circulation: NS  Range of Motion: P  Fluids: IV  Food/Meal: TPN  Elimination: I  Pain Rating (0-10): Rest: 0  Comfort Measures: Repositioned  Assessed readiness for DC: Yes  Privacy Maintained: Yes  Vital Signs per MD order: Yes  Intervention: Restraint Injuries Monitor Q2 hours  Flowsheets (Taken 12/3/2019 0514)  Injuries Noted : None  Treatment: Skin care      0 = understands/communicates without difficulty

## 2025-06-18 NOTE — PATIENT INSTRUCTIONS
Sepsis     To treat sepsis, antibiotics and fluids may by given through an intravenous (IV) line.     Sepsis happens when your body responds with widespread inflammation to a bad infection or bacteremia--the presence of bacteria in your bloodstream. Sepsis can be deadly. Blood pressure may drop and the lungs and kidneys may start to fail. Emergency care for sepsis is crucial.  Risk factors  Those most at risk for sepsis are:  · Infants or older adults  · People who have an illness that weakens their immune system, such as cancer, AIDS, or diabetes  · People being treated with chemotherapy medicines or radiation, which weakens the immune system  · People who have had a transplant  · People with a very severe infection such as pneumonia, meningitis, or a urinary tract infection  When to go to the emergency department (ED)  Sepsis is an emergency. Go to the nearest ED if you have a fever with any of these symptoms:  · Chills and shaking  · Rapid heartbeat and breathing  · Trouble breathing  · Severe nausea or uncontrolled vomiting  · Confusion, disorientation, drowsiness, or dizziness  · Decreased urination  · Severe pain, including in the back or joints   What to expect in the ED  · Blood and urine tests are done to look for bacteria. They also check for organ failure.  · Blood, urine, or sputum cultures may be taken. The samples are sent to a lab. They are placed in a special container. Any bacteria should grow in 24 hours.  · X-rays or other imaging tests may be done.  A person with sepsis will be admitted to the hospital and treated with antibiotics. Treatment may also include oxygen and intravenous fluids.    © 8954-3808 [employer]. [address] [place] [address], [place] [address]. All rights reserved. This information is not intended as a substitute for professional medical care. Always follow your healthcare professional's instructions.         STABLE

## (undated) DEVICE — MARKER SKIN STND TIP BLUE BARR

## (undated) DEVICE — ELECTRODE BLADE INSULATED 1 IN

## (undated) DEVICE — SEE MEDLINE ITEM 156905

## (undated) DEVICE — CORD BIPOLAR 12 FOOT

## (undated) DEVICE — SEE MEDLINE ITEM 157131

## (undated) DEVICE — STAPLER SKIN PROXIMATE WIDE

## (undated) DEVICE — TOURNIQUET TOURNIKWIK 2 TB 6IN

## (undated) DEVICE — DRESSING SURGICAL 1/2X1/2

## (undated) DEVICE — DRESSING ABSRBNT ISLAND 3.6X8

## (undated) DEVICE — ROUTER TAPERED 2.3MM

## (undated) DEVICE — DRESSING SURGICAL 3/4X3/4

## (undated) DEVICE — BLADE SURG CARBON STEEL SZ11

## (undated) DEVICE — SEE MEDLINE ITEM 157103

## (undated) DEVICE — HOOK LONE STAR BLUNT 12MM

## (undated) DEVICE — SUT 4/0 18IN NUROLON BLK B

## (undated) DEVICE — SUT D SPECIAL VICRYL 2-0

## (undated) DEVICE — DIFFUSER

## (undated) DEVICE — SPONGE GAUZE 16PLY 4X4

## (undated) DEVICE — BURR ROUTER TAPERED

## (undated) DEVICE — TUBE FRAZIER 5MM 2FT SOFT TIP

## (undated) DEVICE — CARTRIDGE OIL

## (undated) DEVICE — CLIPS RANEY SCALP FFS/ASSY

## (undated) DEVICE — WARMER DRAPE STERILE LF

## (undated) DEVICE — Device

## (undated) DEVICE — SUT VICRYL PLUS 3-0 SH 18IN

## (undated) DEVICE — DRESSING SURGICAL 1X3

## (undated) DEVICE — KIT EVACUATOR FULL PERF 100CC

## (undated) DEVICE — DURAPREP SURG SCRUB 26ML

## (undated) DEVICE — ELECTRODE REM PLYHSV RETURN 9

## (undated) DEVICE — SEE MEDLINE ITEM 146292